# Patient Record
Sex: FEMALE | Race: BLACK OR AFRICAN AMERICAN | Employment: OTHER | URBAN - METROPOLITAN AREA
[De-identification: names, ages, dates, MRNs, and addresses within clinical notes are randomized per-mention and may not be internally consistent; named-entity substitution may affect disease eponyms.]

---

## 2017-01-09 ENCOUNTER — OFFICE VISIT (OUTPATIENT)
Dept: INTERNAL MEDICINE CLINIC | Age: 67
End: 2017-01-09

## 2017-01-09 VITALS
SYSTOLIC BLOOD PRESSURE: 130 MMHG | OXYGEN SATURATION: 98 % | HEART RATE: 66 BPM | WEIGHT: 182 LBS | HEIGHT: 70 IN | BODY MASS INDEX: 26.05 KG/M2 | DIASTOLIC BLOOD PRESSURE: 80 MMHG

## 2017-01-09 DIAGNOSIS — J30.89 OTHER ALLERGIC RHINITIS: ICD-10-CM

## 2017-01-09 DIAGNOSIS — E78.2 MIXED HYPERLIPIDEMIA: ICD-10-CM

## 2017-01-09 DIAGNOSIS — R19.7 DIARRHEA, UNSPECIFIED TYPE: ICD-10-CM

## 2017-01-09 DIAGNOSIS — I10 HYPERTENSION GOAL BP (BLOOD PRESSURE) < 130/80: Primary | ICD-10-CM

## 2017-01-09 DIAGNOSIS — R12 HEARTBURN: ICD-10-CM

## 2017-01-09 DIAGNOSIS — E55.9 VITAMIN D DEFICIENCY: ICD-10-CM

## 2017-01-09 DIAGNOSIS — M79.671 FOOT PAIN, BILATERAL: ICD-10-CM

## 2017-01-09 DIAGNOSIS — M79.672 FOOT PAIN, BILATERAL: ICD-10-CM

## 2017-01-09 PROCEDURE — 99214 OFFICE O/P EST MOD 30 MIN: CPT | Performed by: INTERNAL MEDICINE

## 2017-01-09 RX ORDER — ATENOLOL 50 MG/1
50 TABLET ORAL DAILY
Qty: 90 TABLET | Refills: 0 | Status: SHIPPED | OUTPATIENT
Start: 2017-01-09 | End: 2017-04-27 | Stop reason: SDUPTHER

## 2017-01-09 RX ORDER — MULTIVIT-MIN/IRON/FOLIC ACID/K 18-600-40
1 CAPSULE ORAL DAILY
Qty: 30 CAPSULE | Refills: 3 | COMMUNITY
Start: 2017-01-09 | End: 2017-04-27 | Stop reason: SDUPTHER

## 2017-01-09 RX ORDER — VALSARTAN 160 MG/1
TABLET ORAL
Qty: 90 TABLET | Refills: 0 | Status: SHIPPED | OUTPATIENT
Start: 2017-01-09 | End: 2017-04-27 | Stop reason: SDUPTHER

## 2017-01-18 ENCOUNTER — OFFICE VISIT (OUTPATIENT)
Dept: DERMATOLOGY | Age: 67
End: 2017-01-18

## 2017-01-18 DIAGNOSIS — D22.9 BENIGN NEVUS: Primary | ICD-10-CM

## 2017-01-18 DIAGNOSIS — L72.0 EPIDERMOID CYST: ICD-10-CM

## 2017-01-18 DIAGNOSIS — L68.0 HIRSUTISM: ICD-10-CM

## 2017-01-18 PROCEDURE — 99202 OFFICE O/P NEW SF 15 MIN: CPT | Performed by: DERMATOLOGY

## 2017-01-21 DIAGNOSIS — I10 HYPERTENSION GOAL BP (BLOOD PRESSURE) < 130/80: ICD-10-CM

## 2017-01-21 DIAGNOSIS — E78.2 MIXED HYPERLIPIDEMIA: ICD-10-CM

## 2017-01-21 LAB
ALBUMIN SERPL-MCNC: 4.3 G/DL (ref 3.4–5)
ALP BLD-CCNC: 105 U/L (ref 40–129)
ALT SERPL-CCNC: 20 U/L (ref 10–40)
ANION GAP SERPL CALCULATED.3IONS-SCNC: 12 MMOL/L (ref 3–16)
AST SERPL-CCNC: 18 U/L (ref 15–37)
BILIRUB SERPL-MCNC: 0.6 MG/DL (ref 0–1)
BILIRUBIN DIRECT: <0.2 MG/DL (ref 0–0.3)
BILIRUBIN, INDIRECT: NORMAL MG/DL (ref 0–1)
BUN BLDV-MCNC: 10 MG/DL (ref 7–20)
CALCIUM SERPL-MCNC: 9.8 MG/DL (ref 8.3–10.6)
CHLORIDE BLD-SCNC: 102 MMOL/L (ref 99–110)
CHOLESTEROL, TOTAL: 213 MG/DL (ref 0–199)
CO2: 29 MMOL/L (ref 21–32)
CREAT SERPL-MCNC: 0.7 MG/DL (ref 0.6–1.2)
GFR AFRICAN AMERICAN: >60
GFR NON-AFRICAN AMERICAN: >60
GLUCOSE BLD-MCNC: 90 MG/DL (ref 70–99)
HDLC SERPL-MCNC: 57 MG/DL (ref 40–60)
LDL CHOLESTEROL CALCULATED: 134 MG/DL
POTASSIUM SERPL-SCNC: 5.2 MMOL/L (ref 3.5–5.1)
SODIUM BLD-SCNC: 143 MMOL/L (ref 136–145)
TOTAL PROTEIN: 7 G/DL (ref 6.4–8.2)
TRIGL SERPL-MCNC: 110 MG/DL (ref 0–150)
VLDLC SERPL CALC-MCNC: 22 MG/DL

## 2017-04-27 ENCOUNTER — OFFICE VISIT (OUTPATIENT)
Dept: INTERNAL MEDICINE CLINIC | Age: 67
End: 2017-04-27

## 2017-04-27 VITALS
HEIGHT: 70 IN | HEART RATE: 54 BPM | DIASTOLIC BLOOD PRESSURE: 80 MMHG | OXYGEN SATURATION: 98 % | BODY MASS INDEX: 25.91 KG/M2 | WEIGHT: 181 LBS | SYSTOLIC BLOOD PRESSURE: 124 MMHG

## 2017-04-27 DIAGNOSIS — J30.89 OTHER ALLERGIC RHINITIS: ICD-10-CM

## 2017-04-27 DIAGNOSIS — I10 HYPERTENSION GOAL BP (BLOOD PRESSURE) < 130/80: Primary | ICD-10-CM

## 2017-04-27 DIAGNOSIS — M79.671 FOOT PAIN, BILATERAL: ICD-10-CM

## 2017-04-27 DIAGNOSIS — M79.672 FOOT PAIN, BILATERAL: ICD-10-CM

## 2017-04-27 DIAGNOSIS — E78.2 MIXED HYPERLIPIDEMIA: ICD-10-CM

## 2017-04-27 DIAGNOSIS — R00.1 BRADYCARDIA: ICD-10-CM

## 2017-04-27 DIAGNOSIS — R12 HEARTBURN: ICD-10-CM

## 2017-04-27 DIAGNOSIS — M79.602 ARM PAIN, LEFT: ICD-10-CM

## 2017-04-27 DIAGNOSIS — E55.9 VITAMIN D DEFICIENCY: ICD-10-CM

## 2017-04-27 LAB
ANION GAP SERPL CALCULATED.3IONS-SCNC: 15 MMOL/L (ref 3–16)
BUN BLDV-MCNC: 13 MG/DL (ref 7–20)
CALCIUM SERPL-MCNC: 9.4 MG/DL (ref 8.3–10.6)
CHLORIDE BLD-SCNC: 102 MMOL/L (ref 99–110)
CHOLESTEROL, TOTAL: 207 MG/DL (ref 0–199)
CO2: 26 MMOL/L (ref 21–32)
CREAT SERPL-MCNC: 0.6 MG/DL (ref 0.6–1.2)
GFR AFRICAN AMERICAN: >60
GFR NON-AFRICAN AMERICAN: >60
GLUCOSE BLD-MCNC: 86 MG/DL (ref 70–99)
HDLC SERPL-MCNC: 61 MG/DL (ref 40–60)
LDL CHOLESTEROL CALCULATED: 124 MG/DL
POTASSIUM SERPL-SCNC: 4.5 MMOL/L (ref 3.5–5.1)
SODIUM BLD-SCNC: 143 MMOL/L (ref 136–145)
TRIGL SERPL-MCNC: 108 MG/DL (ref 0–150)
VLDLC SERPL CALC-MCNC: 22 MG/DL

## 2017-04-27 PROCEDURE — 99214 OFFICE O/P EST MOD 30 MIN: CPT | Performed by: INTERNAL MEDICINE

## 2017-04-27 PROCEDURE — 93000 ELECTROCARDIOGRAM COMPLETE: CPT | Performed by: INTERNAL MEDICINE

## 2017-04-27 RX ORDER — MULTIVIT-MIN/IRON/FOLIC ACID/K 18-600-40
1 CAPSULE ORAL DAILY
Qty: 30 CAPSULE | Refills: 3 | COMMUNITY
Start: 2017-04-27 | End: 2017-07-14 | Stop reason: SDUPTHER

## 2017-04-27 RX ORDER — ATENOLOL 25 MG/1
25 TABLET ORAL DAILY
Qty: 30 TABLET | Refills: 3 | Status: SHIPPED | OUTPATIENT
Start: 2017-04-27 | End: 2017-06-02 | Stop reason: SINTOL

## 2017-04-27 RX ORDER — ATENOLOL 50 MG/1
50 TABLET ORAL DAILY
Qty: 90 TABLET | Refills: 0 | Status: SHIPPED | OUTPATIENT
Start: 2017-04-27 | End: 2017-04-27 | Stop reason: DRUGHIGH

## 2017-04-27 RX ORDER — VALSARTAN 160 MG/1
TABLET ORAL
Qty: 90 TABLET | Refills: 0 | Status: SHIPPED | OUTPATIENT
Start: 2017-04-27 | End: 2017-04-27 | Stop reason: DRUGHIGH

## 2017-04-27 RX ORDER — VALSARTAN 320 MG/1
320 TABLET ORAL DAILY
Qty: 30 TABLET | Refills: 3 | Status: SHIPPED | OUTPATIENT
Start: 2017-04-27 | End: 2017-06-02 | Stop reason: SINTOL

## 2017-04-27 ASSESSMENT — PATIENT HEALTH QUESTIONNAIRE - PHQ9
1. LITTLE INTEREST OR PLEASURE IN DOING THINGS: 0
SUM OF ALL RESPONSES TO PHQ QUESTIONS 1-9: 0
SUM OF ALL RESPONSES TO PHQ9 QUESTIONS 1 & 2: 0
2. FEELING DOWN, DEPRESSED OR HOPELESS: 0

## 2017-04-28 LAB
TSH REFLEX: 0.4 UIU/ML (ref 0.27–4.2)
VITAMIN D 25-HYDROXY: 49.2 NG/ML

## 2017-05-05 ENCOUNTER — TELEPHONE (OUTPATIENT)
Dept: INTERNAL MEDICINE CLINIC | Age: 67
End: 2017-05-05

## 2017-06-02 ENCOUNTER — OFFICE VISIT (OUTPATIENT)
Dept: INTERNAL MEDICINE CLINIC | Age: 67
End: 2017-06-02

## 2017-06-02 VITALS
SYSTOLIC BLOOD PRESSURE: 138 MMHG | HEART RATE: 66 BPM | HEIGHT: 70 IN | BODY MASS INDEX: 25.91 KG/M2 | WEIGHT: 181 LBS | TEMPERATURE: 98.1 F | OXYGEN SATURATION: 99 % | DIASTOLIC BLOOD PRESSURE: 80 MMHG

## 2017-06-02 DIAGNOSIS — J30.89 OTHER ALLERGIC RHINITIS: ICD-10-CM

## 2017-06-02 DIAGNOSIS — E78.2 MIXED HYPERLIPIDEMIA: ICD-10-CM

## 2017-06-02 DIAGNOSIS — G44.89 OTHER HEADACHE SYNDROME: Primary | ICD-10-CM

## 2017-06-02 DIAGNOSIS — I10 HYPERTENSION GOAL BP (BLOOD PRESSURE) < 130/80: ICD-10-CM

## 2017-06-02 PROCEDURE — 99214 OFFICE O/P EST MOD 30 MIN: CPT | Performed by: INTERNAL MEDICINE

## 2017-06-02 RX ORDER — SPIRONOLACTONE 25 MG/1
25 TABLET ORAL DAILY
Qty: 30 TABLET | Refills: 3 | Status: SHIPPED | OUTPATIENT
Start: 2017-06-02 | End: 2017-07-14 | Stop reason: ALTCHOICE

## 2017-06-02 RX ORDER — FLUTICASONE PROPIONATE 50 MCG
2 SPRAY, SUSPENSION (ML) NASAL DAILY PRN
Qty: 1 BOTTLE | Refills: 1 | Status: SHIPPED | OUTPATIENT
Start: 2017-06-02 | End: 2017-12-14 | Stop reason: SDUPTHER

## 2017-06-02 RX ORDER — LORATADINE 10 MG/1
10 TABLET ORAL DAILY PRN
Qty: 30 TABLET | Refills: 1 | Status: SHIPPED | OUTPATIENT
Start: 2017-06-02 | End: 2017-12-14 | Stop reason: SDUPTHER

## 2017-06-02 RX ORDER — AMLODIPINE BESYLATE 10 MG/1
10 TABLET ORAL DAILY
Qty: 30 TABLET | Refills: 3 | Status: SHIPPED | OUTPATIENT
Start: 2017-06-02 | End: 2017-07-14 | Stop reason: SINTOL

## 2017-06-14 DIAGNOSIS — M79.601 ARM PAIN, RIGHT: Primary | ICD-10-CM

## 2017-06-14 PROCEDURE — 73060 X-RAY EXAM OF HUMERUS: CPT | Performed by: ORTHOPAEDIC SURGERY

## 2017-06-20 ENCOUNTER — OFFICE VISIT (OUTPATIENT)
Dept: INTERNAL MEDICINE CLINIC | Age: 67
End: 2017-06-20

## 2017-06-20 VITALS
WEIGHT: 180 LBS | OXYGEN SATURATION: 98 % | DIASTOLIC BLOOD PRESSURE: 80 MMHG | BODY MASS INDEX: 25.77 KG/M2 | SYSTOLIC BLOOD PRESSURE: 122 MMHG | TEMPERATURE: 97.5 F | HEIGHT: 70 IN | HEART RATE: 88 BPM

## 2017-06-20 DIAGNOSIS — M25.472 PAIN AND SWELLING OF ANKLE, LEFT: Primary | ICD-10-CM

## 2017-06-20 DIAGNOSIS — M25.572 PAIN AND SWELLING OF ANKLE, LEFT: Primary | ICD-10-CM

## 2017-06-20 DIAGNOSIS — G44.89 OTHER HEADACHE SYNDROME: ICD-10-CM

## 2017-06-20 DIAGNOSIS — L03.116 CELLULITIS OF LEFT ANKLE: ICD-10-CM

## 2017-06-20 DIAGNOSIS — E78.2 MIXED HYPERLIPIDEMIA: ICD-10-CM

## 2017-06-20 DIAGNOSIS — J30.89 OTHER ALLERGIC RHINITIS: ICD-10-CM

## 2017-06-20 DIAGNOSIS — I10 ESSENTIAL HYPERTENSION: ICD-10-CM

## 2017-06-20 LAB
BASOPHILS ABSOLUTE: 0 K/UL (ref 0–0.2)
BASOPHILS RELATIVE PERCENT: 0.5 %
EOSINOPHILS ABSOLUTE: 0 K/UL (ref 0–0.6)
EOSINOPHILS RELATIVE PERCENT: 0.1 %
HCT VFR BLD CALC: 41.6 % (ref 36–48)
HEMOGLOBIN: 13.7 G/DL (ref 12–16)
LYMPHOCYTES ABSOLUTE: 1.6 K/UL (ref 1–5.1)
LYMPHOCYTES RELATIVE PERCENT: 34.8 %
MCH RBC QN AUTO: 31.1 PG (ref 26–34)
MCHC RBC AUTO-ENTMCNC: 33 G/DL (ref 31–36)
MCV RBC AUTO: 94.2 FL (ref 80–100)
MONOCYTES ABSOLUTE: 0.4 K/UL (ref 0–1.3)
MONOCYTES RELATIVE PERCENT: 8.8 %
NEUTROPHILS ABSOLUTE: 2.6 K/UL (ref 1.7–7.7)
NEUTROPHILS RELATIVE PERCENT: 55.8 %
PDW BLD-RTO: 13.4 % (ref 12.4–15.4)
PLATELET # BLD: 236 K/UL (ref 135–450)
PMV BLD AUTO: 9.7 FL (ref 5–10.5)
RBC # BLD: 4.42 M/UL (ref 4–5.2)
WBC # BLD: 4.6 K/UL (ref 4–11)

## 2017-06-20 PROCEDURE — 99214 OFFICE O/P EST MOD 30 MIN: CPT | Performed by: INTERNAL MEDICINE

## 2017-06-20 RX ORDER — CEPHALEXIN 500 MG/1
500 CAPSULE ORAL 4 TIMES DAILY
Qty: 28 CAPSULE | Refills: 0 | Status: SHIPPED | OUTPATIENT
Start: 2017-06-20 | End: 2017-06-27

## 2017-06-21 LAB
ANION GAP SERPL CALCULATED.3IONS-SCNC: 19 MMOL/L (ref 3–16)
BUN BLDV-MCNC: 13 MG/DL (ref 7–20)
CALCIUM SERPL-MCNC: 10.2 MG/DL (ref 8.3–10.6)
CHLORIDE BLD-SCNC: 98 MMOL/L (ref 99–110)
CO2: 24 MMOL/L (ref 21–32)
CREAT SERPL-MCNC: 0.7 MG/DL (ref 0.6–1.2)
GFR AFRICAN AMERICAN: >60
GFR NON-AFRICAN AMERICAN: >60
GLUCOSE BLD-MCNC: 88 MG/DL (ref 70–99)
POTASSIUM SERPL-SCNC: 5.2 MMOL/L (ref 3.5–5.1)
SODIUM BLD-SCNC: 141 MMOL/L (ref 136–145)
URIC ACID, SERUM: 4.1 MG/DL (ref 2.6–6)

## 2017-07-14 ENCOUNTER — OFFICE VISIT (OUTPATIENT)
Dept: INTERNAL MEDICINE CLINIC | Age: 67
End: 2017-07-14

## 2017-07-14 VITALS
HEIGHT: 70 IN | SYSTOLIC BLOOD PRESSURE: 130 MMHG | OXYGEN SATURATION: 98 % | DIASTOLIC BLOOD PRESSURE: 84 MMHG | HEART RATE: 64 BPM | WEIGHT: 186 LBS | BODY MASS INDEX: 26.63 KG/M2

## 2017-07-14 DIAGNOSIS — E55.9 VITAMIN D DEFICIENCY: ICD-10-CM

## 2017-07-14 DIAGNOSIS — M25.473 PAIN AND SWELLING OF ANKLE, UNSPECIFIED LATERALITY: ICD-10-CM

## 2017-07-14 DIAGNOSIS — E78.2 MIXED HYPERLIPIDEMIA: ICD-10-CM

## 2017-07-14 DIAGNOSIS — I10 ESSENTIAL HYPERTENSION: ICD-10-CM

## 2017-07-14 DIAGNOSIS — R60.0 LOCALIZED EDEMA: Primary | ICD-10-CM

## 2017-07-14 DIAGNOSIS — J30.89 OTHER ALLERGIC RHINITIS: ICD-10-CM

## 2017-07-14 DIAGNOSIS — M25.579 PAIN AND SWELLING OF ANKLE, UNSPECIFIED LATERALITY: ICD-10-CM

## 2017-07-14 PROCEDURE — 99214 OFFICE O/P EST MOD 30 MIN: CPT | Performed by: INTERNAL MEDICINE

## 2017-07-14 RX ORDER — FUROSEMIDE 20 MG/1
20 TABLET ORAL DAILY PRN
Qty: 30 TABLET | Refills: 0 | Status: SHIPPED | OUTPATIENT
Start: 2017-07-14 | End: 2018-06-15 | Stop reason: ALTCHOICE

## 2017-07-14 RX ORDER — MULTIVIT-MIN/IRON/FOLIC ACID/K 18-600-40
1 CAPSULE ORAL DAILY
Qty: 30 CAPSULE | Refills: 3 | Status: SHIPPED | OUTPATIENT
Start: 2017-07-14 | End: 2017-08-11 | Stop reason: SDUPTHER

## 2017-07-14 RX ORDER — VALSARTAN 160 MG/1
160 TABLET ORAL DAILY
Qty: 30 TABLET | Refills: 3 | Status: SHIPPED | OUTPATIENT
Start: 2017-07-14 | End: 2017-08-11 | Stop reason: SDUPTHER

## 2017-08-11 ENCOUNTER — HOSPITAL ENCOUNTER (OUTPATIENT)
Dept: VASCULAR LAB | Age: 67
Discharge: HOME | End: 2017-08-11
Attending: INTERNAL MEDICINE | Admitting: INTERNAL MEDICINE

## 2017-08-11 ENCOUNTER — OFFICE VISIT (OUTPATIENT)
Dept: INTERNAL MEDICINE CLINIC | Age: 67
End: 2017-08-11

## 2017-08-11 ENCOUNTER — TELEPHONE (OUTPATIENT)
Dept: INTERNAL MEDICINE CLINIC | Age: 67
End: 2017-08-11

## 2017-08-11 VITALS
HEIGHT: 69 IN | WEIGHT: 181.6 LBS | BODY MASS INDEX: 26.9 KG/M2 | DIASTOLIC BLOOD PRESSURE: 86 MMHG | HEART RATE: 63 BPM | SYSTOLIC BLOOD PRESSURE: 132 MMHG | OXYGEN SATURATION: 99 % | TEMPERATURE: 97.7 F

## 2017-08-11 DIAGNOSIS — I10 ESSENTIAL HYPERTENSION: ICD-10-CM

## 2017-08-11 DIAGNOSIS — M79.89 PAIN AND SWELLING OF LOWER EXTREMITY, RIGHT: Primary | ICD-10-CM

## 2017-08-11 DIAGNOSIS — J30.89 ALLERGIC RHINITIS DUE TO OTHER ALLERGEN: ICD-10-CM

## 2017-08-11 DIAGNOSIS — R60.0 LOCALIZED EDEMA: Primary | ICD-10-CM

## 2017-08-11 DIAGNOSIS — M79.604 PAIN AND SWELLING OF LOWER EXTREMITY, RIGHT: Primary | ICD-10-CM

## 2017-08-11 DIAGNOSIS — E78.2 MIXED HYPERLIPIDEMIA: ICD-10-CM

## 2017-08-11 DIAGNOSIS — E55.9 VITAMIN D DEFICIENCY: ICD-10-CM

## 2017-08-11 DIAGNOSIS — M79.604 PAIN OF RIGHT LEG: ICD-10-CM

## 2017-08-11 PROCEDURE — 99214 OFFICE O/P EST MOD 30 MIN: CPT | Performed by: INTERNAL MEDICINE

## 2017-08-11 RX ORDER — VALSARTAN 160 MG/1
160 TABLET ORAL DAILY
Qty: 90 TABLET | Refills: 0 | Status: SHIPPED | OUTPATIENT
Start: 2017-08-11 | End: 2017-09-21 | Stop reason: SDUPTHER

## 2017-08-11 RX ORDER — MULTIVIT-MIN/IRON/FOLIC ACID/K 18-600-40
1 CAPSULE ORAL DAILY
Qty: 90 CAPSULE | Refills: 0 | Status: SHIPPED | OUTPATIENT
Start: 2017-08-11 | End: 2017-09-21 | Stop reason: SDUPTHER

## 2017-09-21 ENCOUNTER — OFFICE VISIT (OUTPATIENT)
Dept: INTERNAL MEDICINE CLINIC | Age: 67
End: 2017-09-21

## 2017-09-21 VITALS
WEIGHT: 180.2 LBS | BODY MASS INDEX: 26.69 KG/M2 | HEIGHT: 69 IN | TEMPERATURE: 97.8 F | HEART RATE: 63 BPM | SYSTOLIC BLOOD PRESSURE: 126 MMHG | DIASTOLIC BLOOD PRESSURE: 80 MMHG | OXYGEN SATURATION: 99 %

## 2017-09-21 DIAGNOSIS — E78.2 MIXED HYPERLIPIDEMIA: ICD-10-CM

## 2017-09-21 DIAGNOSIS — I10 ESSENTIAL HYPERTENSION: Primary | ICD-10-CM

## 2017-09-21 DIAGNOSIS — R12 HEARTBURN: ICD-10-CM

## 2017-09-21 DIAGNOSIS — M79.89 PAIN AND SWELLING OF LOWER EXTREMITY, RIGHT: ICD-10-CM

## 2017-09-21 DIAGNOSIS — J30.89 ALLERGIC RHINITIS DUE TO OTHER ALLERGEN: ICD-10-CM

## 2017-09-21 DIAGNOSIS — M79.604 PAIN AND SWELLING OF LOWER EXTREMITY, RIGHT: ICD-10-CM

## 2017-09-21 DIAGNOSIS — E55.9 VITAMIN D DEFICIENCY: ICD-10-CM

## 2017-09-21 LAB
ANION GAP SERPL CALCULATED.3IONS-SCNC: 14 MMOL/L (ref 3–16)
BUN BLDV-MCNC: 13 MG/DL (ref 7–20)
CALCIUM SERPL-MCNC: 10.1 MG/DL (ref 8.3–10.6)
CHLORIDE BLD-SCNC: 100 MMOL/L (ref 99–110)
CHOLESTEROL, TOTAL: 203 MG/DL (ref 0–199)
CO2: 28 MMOL/L (ref 21–32)
CREAT SERPL-MCNC: 0.7 MG/DL (ref 0.6–1.2)
GFR AFRICAN AMERICAN: >60
GFR NON-AFRICAN AMERICAN: >60
GLUCOSE BLD-MCNC: 84 MG/DL (ref 70–99)
HDLC SERPL-MCNC: 54 MG/DL (ref 40–60)
LDL CHOLESTEROL CALCULATED: 124 MG/DL
POTASSIUM SERPL-SCNC: 4.7 MMOL/L (ref 3.5–5.1)
SODIUM BLD-SCNC: 142 MMOL/L (ref 136–145)
TRIGL SERPL-MCNC: 123 MG/DL (ref 0–150)
VITAMIN D 25-HYDROXY: 59.2 NG/ML
VLDLC SERPL CALC-MCNC: 25 MG/DL

## 2017-09-21 PROCEDURE — 99214 OFFICE O/P EST MOD 30 MIN: CPT | Performed by: INTERNAL MEDICINE

## 2017-09-21 RX ORDER — VALSARTAN 160 MG/1
160 TABLET ORAL DAILY
Qty: 90 TABLET | Refills: 0 | Status: SHIPPED | OUTPATIENT
Start: 2017-09-21 | End: 2017-12-14 | Stop reason: SDUPTHER

## 2017-09-21 RX ORDER — MULTIVIT-MIN/IRON/FOLIC ACID/K 18-600-40
1 CAPSULE ORAL DAILY
Qty: 90 CAPSULE | Refills: 0 | Status: SHIPPED | OUTPATIENT
Start: 2017-09-21 | End: 2017-12-14 | Stop reason: SDUPTHER

## 2017-09-21 RX ORDER — RANITIDINE 150 MG/1
150 TABLET ORAL 2 TIMES DAILY
Qty: 60 TABLET | Refills: 3 | Status: SHIPPED | OUTPATIENT
Start: 2017-09-21 | End: 2017-12-14 | Stop reason: SDUPTHER

## 2017-12-14 ENCOUNTER — OFFICE VISIT (OUTPATIENT)
Dept: INTERNAL MEDICINE CLINIC | Age: 67
End: 2017-12-14

## 2017-12-14 VITALS
HEIGHT: 70 IN | OXYGEN SATURATION: 99 % | TEMPERATURE: 98.1 F | BODY MASS INDEX: 25.65 KG/M2 | HEART RATE: 78 BPM | WEIGHT: 179.2 LBS | SYSTOLIC BLOOD PRESSURE: 130 MMHG | DIASTOLIC BLOOD PRESSURE: 80 MMHG

## 2017-12-14 DIAGNOSIS — I10 ESSENTIAL HYPERTENSION: Primary | ICD-10-CM

## 2017-12-14 DIAGNOSIS — J30.89 OTHER ALLERGIC RHINITIS: ICD-10-CM

## 2017-12-14 DIAGNOSIS — E55.9 VITAMIN D DEFICIENCY: ICD-10-CM

## 2017-12-14 DIAGNOSIS — R12 HEARTBURN: ICD-10-CM

## 2017-12-14 DIAGNOSIS — E78.2 MIXED HYPERLIPIDEMIA: ICD-10-CM

## 2017-12-14 DIAGNOSIS — H53.9 VISION DISTURBANCE: ICD-10-CM

## 2017-12-14 DIAGNOSIS — M79.89 RIGHT LEG SWELLING: ICD-10-CM

## 2017-12-14 PROCEDURE — G8427 DOCREV CUR MEDS BY ELIG CLIN: HCPCS | Performed by: INTERNAL MEDICINE

## 2017-12-14 PROCEDURE — 1123F ACP DISCUSS/DSCN MKR DOCD: CPT | Performed by: INTERNAL MEDICINE

## 2017-12-14 PROCEDURE — 3014F SCREEN MAMMO DOC REV: CPT | Performed by: INTERNAL MEDICINE

## 2017-12-14 PROCEDURE — 3017F COLORECTAL CA SCREEN DOC REV: CPT | Performed by: INTERNAL MEDICINE

## 2017-12-14 PROCEDURE — 4040F PNEUMOC VAC/ADMIN/RCVD: CPT | Performed by: INTERNAL MEDICINE

## 2017-12-14 PROCEDURE — G8484 FLU IMMUNIZE NO ADMIN: HCPCS | Performed by: INTERNAL MEDICINE

## 2017-12-14 PROCEDURE — 1036F TOBACCO NON-USER: CPT | Performed by: INTERNAL MEDICINE

## 2017-12-14 PROCEDURE — 99214 OFFICE O/P EST MOD 30 MIN: CPT | Performed by: INTERNAL MEDICINE

## 2017-12-14 PROCEDURE — G8400 PT W/DXA NO RESULTS DOC: HCPCS | Performed by: INTERNAL MEDICINE

## 2017-12-14 PROCEDURE — 1090F PRES/ABSN URINE INCON ASSESS: CPT | Performed by: INTERNAL MEDICINE

## 2017-12-14 PROCEDURE — G8417 CALC BMI ABV UP PARAM F/U: HCPCS | Performed by: INTERNAL MEDICINE

## 2017-12-14 RX ORDER — FLUTICASONE PROPIONATE 50 MCG
2 SPRAY, SUSPENSION (ML) NASAL DAILY PRN
Qty: 1 BOTTLE | Refills: 1 | Status: SHIPPED | OUTPATIENT
Start: 2017-12-14

## 2017-12-14 RX ORDER — VALSARTAN 160 MG/1
160 TABLET ORAL DAILY
Qty: 90 TABLET | Refills: 0 | Status: SHIPPED | OUTPATIENT
Start: 2017-12-14 | End: 2018-03-15 | Stop reason: SDUPTHER

## 2017-12-14 RX ORDER — RANITIDINE 150 MG/1
150 TABLET ORAL 2 TIMES DAILY
Qty: 60 TABLET | Refills: 3 | Status: SHIPPED | OUTPATIENT
Start: 2017-12-14 | End: 2018-03-15 | Stop reason: SDUPTHER

## 2017-12-14 RX ORDER — LORATADINE 10 MG/1
10 TABLET ORAL DAILY PRN
Qty: 30 TABLET | Refills: 1 | Status: SHIPPED | OUTPATIENT
Start: 2017-12-14 | End: 2018-01-13

## 2017-12-14 RX ORDER — MULTIVIT-MIN/IRON/FOLIC ACID/K 18-600-40
1 CAPSULE ORAL DAILY
Qty: 90 CAPSULE | Refills: 0 | Status: SHIPPED | OUTPATIENT
Start: 2017-12-14 | End: 2018-03-15 | Stop reason: SDUPTHER

## 2017-12-14 NOTE — PROGRESS NOTES
SUBJECTIVE:  Skyler Corado is a 74 y.o. female [address] Complaint   Patient presents with    Hypertension     3 MONTH FOLLOW UP    Hyperlipidemia    Other      PT HERE FOR EVAL    HTN - TAKING MED. NO HEADACHE  , DIZZINESS No.   HLP - + DIET / EXERCISE COMPLIANCE. LABS D/W PT  VIT D DEF - TAKING MED. LABS D/W PT  ALLERGIC RHINITIS - + NASAL CONGESTION, + POSTNASAL DRAINAGE , NO SINUS PRESSURE, NO HA, OCC SNEEZING, + OCC WATERY ITCHY EYES. NO SCRATCHY THROAT  SWELLING - RT ANKLE AND RT LEG. INTERMITTENT. IMPROVED. NO PAIN . HEARTBURN - INTERMITTENT. OVERALL IMPROVED. RANITIDINE HELPING  C/O VISUAL CHANGES - FFG WITH OPHTHAL      DENIES CP, No SOB, No PALPITATIONS, No COUGH  No ABD PAIN, No N/V, No DIARRHEA, No CONSTIPATION, No MELENA, No HEMATOCHEZIA. No DYSURIA, No FREQ, No URGENCY, No HEMATURIA    PMH: REVIEWED    PSH: REVIEWED:    ALLERGY:  Celebrex [celecoxib]; Sulfa antibiotics; and Codeine    MEDS: REVIEWED  Medication Sig Taking? ranitidine (ZANTAC) 150 MG tablet Take 1 tablet by mouth 2 times daily    valsartan (DIOVAN) 160 MG tablet Take 1 tablet by mouth daily    Cholecalciferol (VITAMIN D) 2000 units CAPS capsule Take 1 capsule by mouth daily    furosemide (LASIX) 20 MG tablet Take 1 tablet by mouth daily as needed (PRN)    fluticasone (FLONASE) 50 MCG/ACT nasal spray 2 sprays by Nasal route daily as needed for Rhinitis    ammonium lactate (AMLACTIN) 12 % cream Apply  topically as needed for Dry Skin. Apply topically as needed.     triamcinolone (ARISTOCORT) 0.5 % cream         ROS: COMPREHENSIVE ROS AS IN HX, REST -VE  History obtained from chart review and the patient    OBJECTIVE:   NURSING NOTE AND VITALS REVIEWED  /72 (Site: Right Arm, Position: Sitting, Cuff Size: Medium Adult)   Pulse 78   Temp 98.1 °F (36.7 °C) (Oral)   Ht 5' 10\" (1.778 m)   Wt 179 lb 3.2 oz (81.3 kg)   SpO2 99%   BMI 25.71 kg/m²     NO ACUTE DISTRESS    REPEAT BP:  130/80 (RT), NO ORTHOSTASIS     Body mass index is 25.71 kg/m². HEENT: NO PALLOR, ANICTERIC, PERRLA, EOMI, NO CONJUNCTIVAL ERYTHEMA,                 + NASAL CONGESTION, NO SINUS TENDERNESS  NECK:  SUPPLE, TRACHEA MIDLINE, NT, NO JVD, NO CB, NO LA, NO TM, NO STIFFNESS  CHEST: RESPY EFFORT NL, GOOD AE, NO W/R/C  HEART: S1S2+ REG, NO M/G/R  ABD: SOFT, NT, NO HSM, BS+  EXT: NO EDEMA ON EVAL, NT, PULSES +. LANDY'S -VE  NEURO: ALERT AND ORIENTED X 3, NO MENINGEAL SIGNS, NO TREMORS, NL GAIT, NO FOCAL DEFICITS  PSYCH: FAIRLY GOOD AFFECT  BACK: NT, NO ROM, NO CVA TENDERNESS    PREVIOUS LABS REVIEWED AND D/W PT       ASSESSMENT / PLAN:    1. Essential hypertension  COUNSELLED. CONTINUE MED. LOW NA+ / DASH DIET/ EXERCISE. MONITOR. GOAL </= 120/80  DIOVAN 160 MG REFILLED  MAKE CHANGES AS NEEDED. 2. Mixed hyperlipidemia  COUNSELLED. ADVISED LOW FAT / CHOL DIET/ EXERCISE.  MONITOR. GOALS D/W PT.  MAKE CHANGES AS NEEDED. 3. Vitamin D deficiency  COUNSELLED. CONTINUE VIT D 2000 U DAILY. MONITOR AND MAKE CHANGES AS NEEDED. 4. Other allergic rhinitis  COUNSELLED. CLARITIN 10 MG DAILY/PRN. FLONASE PRN  MONITOR. MAKE CHANGES AS NEEDED. 5. Right leg and ankle swelling  COUNSELLED. IMPROVED. ADVISED LOW NA+ DIET. ELEVATE LEGS. MAKE CHANGES AS NEEDED. 6. Heartburn  COUNSELLED. STABLE ON MED - ranitidine (ZANTAC) 150 MG  ANTIREFLUX PRECAUTIONS ADVISED. MONITOR. MAKE CHANGES AS NEEDED. 7. Vision disturbance  COUNSELLED. F/U OPHTHAL FOR CONTINUED MGT. MAKE CHANGES AS NEEDED. PT DECLINED INFLUENZA VACCINE    PT DEFERRED PNEUMONIA VACCINE - READDRESS    Crescencio Pearson received counseling on the following healthy behaviors: nutrition, exercise and medication adherence    Patient given educational materials on Hyperlipidemia, Nutrition, Exercise and Hypertension    I have instructed Crescencio Pearson to complete a self tracking handout on Blood Pressures  and Weights and instructed them to bring it with them to her next appointment.      Discussed

## 2018-03-15 ENCOUNTER — OFFICE VISIT (OUTPATIENT)
Dept: INTERNAL MEDICINE CLINIC | Age: 68
End: 2018-03-15

## 2018-03-15 VITALS
TEMPERATURE: 98.3 F | HEART RATE: 88 BPM | SYSTOLIC BLOOD PRESSURE: 132 MMHG | WEIGHT: 180 LBS | OXYGEN SATURATION: 97 % | DIASTOLIC BLOOD PRESSURE: 84 MMHG | HEIGHT: 70 IN | BODY MASS INDEX: 25.77 KG/M2 | RESPIRATION RATE: 20 BRPM

## 2018-03-15 DIAGNOSIS — E78.2 MIXED HYPERLIPIDEMIA: ICD-10-CM

## 2018-03-15 DIAGNOSIS — I10 ESSENTIAL HYPERTENSION: Primary | ICD-10-CM

## 2018-03-15 DIAGNOSIS — M79.642 HAND PAIN, LEFT: ICD-10-CM

## 2018-03-15 DIAGNOSIS — J30.89 OTHER ALLERGIC RHINITIS: ICD-10-CM

## 2018-03-15 DIAGNOSIS — E55.9 VITAMIN D DEFICIENCY: ICD-10-CM

## 2018-03-15 DIAGNOSIS — R12 HEARTBURN: ICD-10-CM

## 2018-03-15 DIAGNOSIS — H53.9 VISION CHANGES: ICD-10-CM

## 2018-03-15 LAB
ANION GAP SERPL CALCULATED.3IONS-SCNC: 12 MMOL/L (ref 3–16)
BUN BLDV-MCNC: 13 MG/DL (ref 7–20)
CALCIUM SERPL-MCNC: 9.8 MG/DL (ref 8.3–10.6)
CHLORIDE BLD-SCNC: 102 MMOL/L (ref 99–110)
CHOLESTEROL, TOTAL: 208 MG/DL (ref 0–199)
CO2: 29 MMOL/L (ref 21–32)
CREAT SERPL-MCNC: 0.7 MG/DL (ref 0.6–1.2)
GFR AFRICAN AMERICAN: >60
GFR NON-AFRICAN AMERICAN: >60
GLUCOSE BLD-MCNC: 90 MG/DL (ref 70–99)
HDLC SERPL-MCNC: 62 MG/DL (ref 40–60)
LDL CHOLESTEROL CALCULATED: 126 MG/DL
POTASSIUM SERPL-SCNC: 5 MMOL/L (ref 3.5–5.1)
SODIUM BLD-SCNC: 143 MMOL/L (ref 136–145)
TRIGL SERPL-MCNC: 101 MG/DL (ref 0–150)
VITAMIN D 25-HYDROXY: 48 NG/ML
VLDLC SERPL CALC-MCNC: 20 MG/DL

## 2018-03-15 PROCEDURE — 3017F COLORECTAL CA SCREEN DOC REV: CPT | Performed by: INTERNAL MEDICINE

## 2018-03-15 PROCEDURE — 99214 OFFICE O/P EST MOD 30 MIN: CPT | Performed by: INTERNAL MEDICINE

## 2018-03-15 PROCEDURE — 3014F SCREEN MAMMO DOC REV: CPT | Performed by: INTERNAL MEDICINE

## 2018-03-15 PROCEDURE — G8484 FLU IMMUNIZE NO ADMIN: HCPCS | Performed by: INTERNAL MEDICINE

## 2018-03-15 PROCEDURE — 4040F PNEUMOC VAC/ADMIN/RCVD: CPT | Performed by: INTERNAL MEDICINE

## 2018-03-15 PROCEDURE — G8417 CALC BMI ABV UP PARAM F/U: HCPCS | Performed by: INTERNAL MEDICINE

## 2018-03-15 PROCEDURE — G8400 PT W/DXA NO RESULTS DOC: HCPCS | Performed by: INTERNAL MEDICINE

## 2018-03-15 PROCEDURE — 1090F PRES/ABSN URINE INCON ASSESS: CPT | Performed by: INTERNAL MEDICINE

## 2018-03-15 PROCEDURE — G8427 DOCREV CUR MEDS BY ELIG CLIN: HCPCS | Performed by: INTERNAL MEDICINE

## 2018-03-15 PROCEDURE — 1036F TOBACCO NON-USER: CPT | Performed by: INTERNAL MEDICINE

## 2018-03-15 PROCEDURE — 1123F ACP DISCUSS/DSCN MKR DOCD: CPT | Performed by: INTERNAL MEDICINE

## 2018-03-15 RX ORDER — NAPROXEN 500 MG/1
500 TABLET ORAL 2 TIMES DAILY PRN
Qty: 60 TABLET | Refills: 0 | Status: SHIPPED | OUTPATIENT
Start: 2018-03-15 | End: 2019-09-17 | Stop reason: ALTCHOICE

## 2018-03-15 RX ORDER — MULTIVIT-MIN/IRON/FOLIC ACID/K 18-600-40
1 CAPSULE ORAL DAILY
Qty: 90 CAPSULE | Refills: 0 | Status: SHIPPED | OUTPATIENT
Start: 2018-03-15 | End: 2018-06-15 | Stop reason: SDUPTHER

## 2018-03-15 RX ORDER — RANITIDINE 150 MG/1
150 TABLET ORAL 2 TIMES DAILY
Qty: 60 TABLET | Refills: 3 | Status: SHIPPED | OUTPATIENT
Start: 2018-03-15 | End: 2018-06-15 | Stop reason: SDUPTHER

## 2018-03-15 RX ORDER — VALSARTAN 160 MG/1
160 TABLET ORAL DAILY
Qty: 90 TABLET | Refills: 0 | Status: SHIPPED | OUTPATIENT
Start: 2018-03-15 | End: 2018-06-15 | Stop reason: SDUPTHER

## 2018-04-11 DIAGNOSIS — M79.642 HAND PAIN, LEFT: Primary | ICD-10-CM

## 2018-05-14 DIAGNOSIS — I10 ESSENTIAL HYPERTENSION: ICD-10-CM

## 2018-05-14 RX ORDER — VALSARTAN 160 MG/1
160 TABLET ORAL DAILY
Qty: 90 TABLET | Refills: 0 | Status: SHIPPED | OUTPATIENT
Start: 2018-05-14 | End: 2018-07-31 | Stop reason: RX

## 2018-06-15 ENCOUNTER — OFFICE VISIT (OUTPATIENT)
Dept: INTERNAL MEDICINE CLINIC | Age: 68
End: 2018-06-15

## 2018-06-15 VITALS
SYSTOLIC BLOOD PRESSURE: 130 MMHG | RESPIRATION RATE: 14 BRPM | BODY MASS INDEX: 25.34 KG/M2 | OXYGEN SATURATION: 98 % | HEIGHT: 70 IN | WEIGHT: 177 LBS | DIASTOLIC BLOOD PRESSURE: 80 MMHG | TEMPERATURE: 98 F | HEART RATE: 72 BPM

## 2018-06-15 DIAGNOSIS — Z01.818 PRE-OP EXAMINATION: Primary | ICD-10-CM

## 2018-06-15 DIAGNOSIS — I10 ESSENTIAL HYPERTENSION: ICD-10-CM

## 2018-06-15 DIAGNOSIS — R12 HEARTBURN: ICD-10-CM

## 2018-06-15 DIAGNOSIS — M79.642 HAND PAIN, LEFT: ICD-10-CM

## 2018-06-15 DIAGNOSIS — E78.2 MIXED HYPERLIPIDEMIA: ICD-10-CM

## 2018-06-15 DIAGNOSIS — H26.9 CATARACT OF RIGHT EYE, UNSPECIFIED CATARACT TYPE: ICD-10-CM

## 2018-06-15 DIAGNOSIS — E55.9 VITAMIN D DEFICIENCY: ICD-10-CM

## 2018-06-15 DIAGNOSIS — J30.89 OTHER ALLERGIC RHINITIS: ICD-10-CM

## 2018-06-15 PROCEDURE — G8427 DOCREV CUR MEDS BY ELIG CLIN: HCPCS | Performed by: INTERNAL MEDICINE

## 2018-06-15 PROCEDURE — G8400 PT W/DXA NO RESULTS DOC: HCPCS | Performed by: INTERNAL MEDICINE

## 2018-06-15 PROCEDURE — 3017F COLORECTAL CA SCREEN DOC REV: CPT | Performed by: INTERNAL MEDICINE

## 2018-06-15 PROCEDURE — 4040F PNEUMOC VAC/ADMIN/RCVD: CPT | Performed by: INTERNAL MEDICINE

## 2018-06-15 PROCEDURE — 1090F PRES/ABSN URINE INCON ASSESS: CPT | Performed by: INTERNAL MEDICINE

## 2018-06-15 PROCEDURE — 1036F TOBACCO NON-USER: CPT | Performed by: INTERNAL MEDICINE

## 2018-06-15 PROCEDURE — 1123F ACP DISCUSS/DSCN MKR DOCD: CPT | Performed by: INTERNAL MEDICINE

## 2018-06-15 PROCEDURE — G8417 CALC BMI ABV UP PARAM F/U: HCPCS | Performed by: INTERNAL MEDICINE

## 2018-06-15 PROCEDURE — 99215 OFFICE O/P EST HI 40 MIN: CPT | Performed by: INTERNAL MEDICINE

## 2018-06-15 PROCEDURE — 93000 ELECTROCARDIOGRAM COMPLETE: CPT | Performed by: INTERNAL MEDICINE

## 2018-06-15 RX ORDER — MULTIVIT-MIN/IRON/FOLIC ACID/K 18-600-40
1 CAPSULE ORAL DAILY
Qty: 90 CAPSULE | Refills: 0 | Status: SHIPPED | OUTPATIENT
Start: 2018-06-15 | End: 2018-09-10 | Stop reason: SDUPTHER

## 2018-06-15 RX ORDER — RANITIDINE 150 MG/1
150 TABLET ORAL 2 TIMES DAILY
Qty: 60 TABLET | Refills: 3 | Status: SHIPPED | OUTPATIENT
Start: 2018-06-15 | End: 2018-09-10 | Stop reason: SDUPTHER

## 2018-06-15 RX ORDER — TETANUS AND DIPHTHERIA TOXOIDS ADSORBED 2; 2 [LF]/.5ML; [LF]/.5ML
0.5 INJECTION INTRAMUSCULAR ONCE
Qty: 0.5 ML | Refills: 0 | Status: CANCELLED | OUTPATIENT
Start: 2018-06-15 | End: 2018-06-15

## 2018-06-15 ASSESSMENT — PATIENT HEALTH QUESTIONNAIRE - PHQ9
SUM OF ALL RESPONSES TO PHQ QUESTIONS 1-9: 0
2. FEELING DOWN, DEPRESSED OR HOPELESS: 0
1. LITTLE INTEREST OR PLEASURE IN DOING THINGS: 0
SUM OF ALL RESPONSES TO PHQ9 QUESTIONS 1 & 2: 0

## 2018-06-18 DIAGNOSIS — I10 ESSENTIAL HYPERTENSION: ICD-10-CM

## 2018-06-18 DIAGNOSIS — Z01.818 PRE-OP EXAMINATION: ICD-10-CM

## 2018-06-18 DIAGNOSIS — H26.9 CATARACT OF RIGHT EYE, UNSPECIFIED CATARACT TYPE: ICD-10-CM

## 2018-06-18 LAB
ANION GAP SERPL CALCULATED.3IONS-SCNC: 13 MMOL/L (ref 3–16)
BASOPHILS ABSOLUTE: 0 K/UL (ref 0–0.2)
BASOPHILS RELATIVE PERCENT: 0.9 %
BUN BLDV-MCNC: 10 MG/DL (ref 7–20)
CALCIUM SERPL-MCNC: 9.6 MG/DL (ref 8.3–10.6)
CHLORIDE BLD-SCNC: 105 MMOL/L (ref 99–110)
CO2: 28 MMOL/L (ref 21–32)
CREAT SERPL-MCNC: 0.8 MG/DL (ref 0.6–1.2)
EOSINOPHILS ABSOLUTE: 0 K/UL (ref 0–0.6)
EOSINOPHILS RELATIVE PERCENT: 0 %
GFR AFRICAN AMERICAN: >60
GFR NON-AFRICAN AMERICAN: >60
GLUCOSE BLD-MCNC: 86 MG/DL (ref 70–99)
HCT VFR BLD CALC: 41.2 % (ref 36–48)
HEMOGLOBIN: 13.6 G/DL (ref 12–16)
LYMPHOCYTES ABSOLUTE: 1.3 K/UL (ref 1–5.1)
LYMPHOCYTES RELATIVE PERCENT: 38 %
MCH RBC QN AUTO: 31.4 PG (ref 26–34)
MCHC RBC AUTO-ENTMCNC: 33.1 G/DL (ref 31–36)
MCV RBC AUTO: 94.9 FL (ref 80–100)
MONOCYTES ABSOLUTE: 0.3 K/UL (ref 0–1.3)
MONOCYTES RELATIVE PERCENT: 9.1 %
NEUTROPHILS ABSOLUTE: 1.7 K/UL (ref 1.7–7.7)
NEUTROPHILS RELATIVE PERCENT: 52 %
PDW BLD-RTO: 14 % (ref 12.4–15.4)
PLATELET # BLD: 215 K/UL (ref 135–450)
PMV BLD AUTO: 9.9 FL (ref 5–10.5)
POTASSIUM SERPL-SCNC: 4.3 MMOL/L (ref 3.5–5.1)
RBC # BLD: 4.34 M/UL (ref 4–5.2)
SODIUM BLD-SCNC: 146 MMOL/L (ref 136–145)
WBC # BLD: 3.3 K/UL (ref 4–11)

## 2018-06-20 ENCOUNTER — TELEPHONE (OUTPATIENT)
Dept: INTERNAL MEDICINE CLINIC | Age: 68
End: 2018-06-20

## 2018-07-31 ENCOUNTER — TELEPHONE (OUTPATIENT)
Dept: INTERNAL MEDICINE CLINIC | Age: 68
End: 2018-07-31

## 2018-07-31 RX ORDER — IRBESARTAN 150 MG/1
150 TABLET ORAL DAILY
Qty: 30 TABLET | Refills: 3 | Status: SHIPPED | OUTPATIENT
Start: 2018-07-31 | End: 2018-09-10 | Stop reason: SDUPTHER

## 2018-09-10 ENCOUNTER — OFFICE VISIT (OUTPATIENT)
Dept: INTERNAL MEDICINE CLINIC | Age: 68
End: 2018-09-10

## 2018-09-10 VITALS
OXYGEN SATURATION: 98 % | SYSTOLIC BLOOD PRESSURE: 124 MMHG | HEART RATE: 69 BPM | HEIGHT: 70 IN | DIASTOLIC BLOOD PRESSURE: 80 MMHG | BODY MASS INDEX: 25.65 KG/M2 | WEIGHT: 179.2 LBS | TEMPERATURE: 98.3 F

## 2018-09-10 DIAGNOSIS — E78.2 MIXED HYPERLIPIDEMIA: ICD-10-CM

## 2018-09-10 DIAGNOSIS — M79.642 HAND PAIN, LEFT: ICD-10-CM

## 2018-09-10 DIAGNOSIS — E78.2 MIXED HYPERLIPIDEMIA: Primary | ICD-10-CM

## 2018-09-10 DIAGNOSIS — E55.9 VITAMIN D DEFICIENCY: ICD-10-CM

## 2018-09-10 DIAGNOSIS — R12 HEARTBURN: ICD-10-CM

## 2018-09-10 DIAGNOSIS — D72.818 OTHER DECREASED WHITE BLOOD CELL (WBC) COUNT: ICD-10-CM

## 2018-09-10 DIAGNOSIS — H57.89 REDNESS OF RIGHT EYE: ICD-10-CM

## 2018-09-10 DIAGNOSIS — I10 ESSENTIAL HYPERTENSION: ICD-10-CM

## 2018-09-10 DIAGNOSIS — J30.89 OTHER ALLERGIC RHINITIS: ICD-10-CM

## 2018-09-10 LAB
BASOPHILS ABSOLUTE: 0 K/UL (ref 0–0.2)
BASOPHILS RELATIVE PERCENT: 0.5 %
EOSINOPHILS ABSOLUTE: 0 K/UL (ref 0–0.6)
EOSINOPHILS RELATIVE PERCENT: 0.1 %
HCT VFR BLD CALC: 42.6 % (ref 36–48)
HEMOGLOBIN: 14.1 G/DL (ref 12–16)
LYMPHOCYTES ABSOLUTE: 1.4 K/UL (ref 1–5.1)
LYMPHOCYTES RELATIVE PERCENT: 28.8 %
MCH RBC QN AUTO: 32.1 PG (ref 26–34)
MCHC RBC AUTO-ENTMCNC: 33.2 G/DL (ref 31–36)
MCV RBC AUTO: 96.8 FL (ref 80–100)
MONOCYTES ABSOLUTE: 0.4 K/UL (ref 0–1.3)
MONOCYTES RELATIVE PERCENT: 7.7 %
NEUTROPHILS ABSOLUTE: 3.1 K/UL (ref 1.7–7.7)
NEUTROPHILS RELATIVE PERCENT: 62.9 %
PDW BLD-RTO: 14.3 % (ref 12.4–15.4)
PLATELET # BLD: 238 K/UL (ref 135–450)
PMV BLD AUTO: 9.8 FL (ref 5–10.5)
RBC # BLD: 4.4 M/UL (ref 4–5.2)
WBC # BLD: 4.9 K/UL (ref 4–11)

## 2018-09-10 PROCEDURE — 99214 OFFICE O/P EST MOD 30 MIN: CPT | Performed by: INTERNAL MEDICINE

## 2018-09-10 PROCEDURE — 4040F PNEUMOC VAC/ADMIN/RCVD: CPT | Performed by: INTERNAL MEDICINE

## 2018-09-10 PROCEDURE — G8427 DOCREV CUR MEDS BY ELIG CLIN: HCPCS | Performed by: INTERNAL MEDICINE

## 2018-09-10 PROCEDURE — 1123F ACP DISCUSS/DSCN MKR DOCD: CPT | Performed by: INTERNAL MEDICINE

## 2018-09-10 PROCEDURE — 3017F COLORECTAL CA SCREEN DOC REV: CPT | Performed by: INTERNAL MEDICINE

## 2018-09-10 PROCEDURE — 1101F PT FALLS ASSESS-DOCD LE1/YR: CPT | Performed by: INTERNAL MEDICINE

## 2018-09-10 PROCEDURE — G8400 PT W/DXA NO RESULTS DOC: HCPCS | Performed by: INTERNAL MEDICINE

## 2018-09-10 PROCEDURE — 1090F PRES/ABSN URINE INCON ASSESS: CPT | Performed by: INTERNAL MEDICINE

## 2018-09-10 PROCEDURE — 1036F TOBACCO NON-USER: CPT | Performed by: INTERNAL MEDICINE

## 2018-09-10 PROCEDURE — G8417 CALC BMI ABV UP PARAM F/U: HCPCS | Performed by: INTERNAL MEDICINE

## 2018-09-10 RX ORDER — RANITIDINE 150 MG/1
150 TABLET ORAL 2 TIMES DAILY
Qty: 60 TABLET | Refills: 3 | Status: SHIPPED | OUTPATIENT
Start: 2018-09-10 | End: 2019-03-11 | Stop reason: SDUPTHER

## 2018-09-10 RX ORDER — IRBESARTAN 150 MG/1
150 TABLET ORAL DAILY
Qty: 90 TABLET | Refills: 0 | Status: SHIPPED | OUTPATIENT
Start: 2018-09-10 | End: 2018-12-10 | Stop reason: SDUPTHER

## 2018-09-10 RX ORDER — MULTIVIT-MIN/IRON/FOLIC ACID/K 18-600-40
1 CAPSULE ORAL DAILY
Qty: 90 CAPSULE | Refills: 0 | Status: SHIPPED | OUTPATIENT
Start: 2018-09-10 | End: 2018-12-10 | Stop reason: SDUPTHER

## 2018-09-10 RX ORDER — IRBESARTAN 150 MG/1
150 TABLET ORAL DAILY
Qty: 30 TABLET | Refills: 3 | Status: SHIPPED | OUTPATIENT
Start: 2018-09-10 | End: 2018-09-10 | Stop reason: SDUPTHER

## 2018-09-10 NOTE — PROGRESS NOTES
10\" (1.778 m)   Wt 179 lb 3.2 oz (81.3 kg)   SpO2 98%   Breastfeeding? No   BMI 25.71 kg/m²     NO ACUTE DISTRESS    REPEAT BP:  124/80 (LT), NO ORTHOSTASIS     Body mass index is 25.71 kg/m². HEENT: NO PALLOR, ANICTERIC, PERRLA, EOMI, RT CONJUNCTIVAL ERYTHEMA- STATES FOLLOWING WITH OPHTHAL,                 NO SINUS TENDERNESS  NECK:  SUPPLE, TRACHEA MIDLINE, NT, NO JVD, NO CB, NO LA, NO TM, NO STIFFNESS  CHEST: RESPY EFFORT NL, GOOD AE, NO W/R/C  HEART: S1S2+ REG, NO M/G/R  ABD: SOFT, NT, NO HSM, BS+  EXT: NO EDEMA, NT, PULSES +. LANDY'S -VE  NEURO: ALERT AND ORIENTED X 3, NO MENINGEAL SIGNS, NO TREMORS, NL GAIT, NO FOCAL DEFICITS  PSYCH: FAIRLY GOOD AFFECT  BACK: NT, NO ROM, NO CVA TENDERNESS  HAND: NT, NO ROM, NO SWELLING      PREVIOUS LABS REVIEWED AND D/W PT       ASSESSMENT / PLAN:     Diagnosis Orders   1. Mixed hyperlipidemia  COUNSELLED. ADVISED LOW FAT / CHOL DIET/ EXERCISE.  MONITOR. F/U LABS  GOALS D/W PT.  MAKE CHANGES AS NEEDED. 2. Essential hypertension  COUNSELLED. CONTINUE AVAPRO 150 MG   ADVISED LOW NA+ / DASH DIET/ EXERCISE. MONITOR. GOAL </= 120/80  F/U LABS  MAKE CHANGES AS NEEDED. 3. Hand pain, left  COUNSELLED. IMPROVING. EXERCISES. ANALGESICS PRN. MONITOR. MAKE CHANGES AS NEEDED. 4. Vitamin D deficiency  COUNSELLED. MONITOR ON VIT D SUPPLEMENT. MAKE CHANGES AS NEEDED. 5. Heartburn  COUNSELLED. IMPROVED. CONTINUE MGT. ANTIREFLUX PRECAUTIONS ADVISED. MONITOR. MAKE CHANGES AS NEEDED. 6. Other allergic rhinitis  COUNSELLED. MED PRN. MONITOR  MAKE CHANGES AS NEEDED. 7. Other decreased white blood cell (WBC) count  COUNSELLED. LAB TO REEVAL. MONITOR  MAKE CHANGES AS NEEDED. 8. Redness of right eye  COUNSELLED. S/P PRIOR SURGERY. FOLLOW UP MGT PER OPHTHAL  MAKE CHANGES AS NEEDED.                  [facility] received counseling on the following healthy behaviors: nutrition, exercise and medication adherence    Patient given educational materials on Hyperlipidemia, Nutrition, Exercise and Hypertension    I have instructed Neville Valero to complete a self tracking handout on Blood Pressures  and Weights and instructed them to bring it with them to her next appointment. Discussed use, benefit, and side effects of prescribed medications. Barriers to medication compliance addressed. All patient questions answered. Pt voiced understanding.            MEDICATION SIDE EFFECTS D/W PATIENT    PT STABLE AT TIME OF D/C.    RETURN TO CLINIC WITHIN 3 MONTHS / PRN    FOLLOW UP FOR FASTING LABS

## 2018-09-11 LAB
A/G RATIO: 1.7 (ref 1.1–2.2)
ALBUMIN SERPL-MCNC: 4.8 G/DL (ref 3.4–5)
ALP BLD-CCNC: 110 U/L (ref 40–129)
ALT SERPL-CCNC: 27 U/L (ref 10–40)
ANION GAP SERPL CALCULATED.3IONS-SCNC: 14 MMOL/L (ref 3–16)
AST SERPL-CCNC: 35 U/L (ref 15–37)
BILIRUB SERPL-MCNC: 0.6 MG/DL (ref 0–1)
BUN BLDV-MCNC: 10 MG/DL (ref 7–20)
CALCIUM SERPL-MCNC: 10 MG/DL (ref 8.3–10.6)
CHLORIDE BLD-SCNC: 98 MMOL/L (ref 99–110)
CO2: 29 MMOL/L (ref 21–32)
CREAT SERPL-MCNC: 0.7 MG/DL (ref 0.6–1.2)
CREATININE URINE: 91.4 MG/DL (ref 28–259)
GFR AFRICAN AMERICAN: >60
GFR NON-AFRICAN AMERICAN: >60
GLOBULIN: 2.8 G/DL
GLUCOSE BLD-MCNC: 79 MG/DL (ref 70–99)
MICROALBUMIN UR-MCNC: <1.2 MG/DL
MICROALBUMIN/CREAT UR-RTO: NORMAL MG/G (ref 0–30)
POTASSIUM SERPL-SCNC: 4.4 MMOL/L (ref 3.5–5.1)
SODIUM BLD-SCNC: 141 MMOL/L (ref 136–145)
TOTAL PROTEIN: 7.6 G/DL (ref 6.4–8.2)
TSH REFLEX: 0.47 UIU/ML (ref 0.27–4.2)
VITAMIN D 25-HYDROXY: 54.5 NG/ML

## 2018-12-10 ENCOUNTER — OFFICE VISIT (OUTPATIENT)
Dept: INTERNAL MEDICINE CLINIC | Age: 68
End: 2018-12-10
Payer: MEDICARE

## 2018-12-10 VITALS
SYSTOLIC BLOOD PRESSURE: 130 MMHG | HEIGHT: 70 IN | HEART RATE: 61 BPM | OXYGEN SATURATION: 97 % | TEMPERATURE: 98 F | BODY MASS INDEX: 25.74 KG/M2 | WEIGHT: 179.8 LBS | DIASTOLIC BLOOD PRESSURE: 80 MMHG

## 2018-12-10 DIAGNOSIS — E55.9 VITAMIN D DEFICIENCY: ICD-10-CM

## 2018-12-10 DIAGNOSIS — R12 HEARTBURN: ICD-10-CM

## 2018-12-10 DIAGNOSIS — N39.498 OTHER URINARY INCONTINENCE: ICD-10-CM

## 2018-12-10 DIAGNOSIS — E78.2 MIXED HYPERLIPIDEMIA: ICD-10-CM

## 2018-12-10 DIAGNOSIS — I10 ESSENTIAL HYPERTENSION: ICD-10-CM

## 2018-12-10 DIAGNOSIS — R82.90 ABNORMAL URINALYSIS: ICD-10-CM

## 2018-12-10 DIAGNOSIS — J30.89 OTHER ALLERGIC RHINITIS: ICD-10-CM

## 2018-12-10 DIAGNOSIS — Z78.0 MENOPAUSE: ICD-10-CM

## 2018-12-10 DIAGNOSIS — I10 ESSENTIAL HYPERTENSION: Primary | ICD-10-CM

## 2018-12-10 PROBLEM — R32 ABSENCE OF BLADDER CONTINENCE: Status: ACTIVE | Noted: 2018-12-10

## 2018-12-10 LAB
ANION GAP SERPL CALCULATED.3IONS-SCNC: 11 MMOL/L (ref 3–16)
BILIRUBIN, POC: NORMAL
BLOOD URINE, POC: NORMAL
BUN BLDV-MCNC: 11 MG/DL (ref 7–20)
CALCIUM SERPL-MCNC: 10.1 MG/DL (ref 8.3–10.6)
CHLORIDE BLD-SCNC: 103 MMOL/L (ref 99–110)
CHOLESTEROL, TOTAL: 212 MG/DL (ref 0–199)
CLARITY, POC: NORMAL
CO2: 30 MMOL/L (ref 21–32)
COLOR, POC: YELLOW
CREAT SERPL-MCNC: 0.7 MG/DL (ref 0.6–1.2)
GFR AFRICAN AMERICAN: >60
GFR NON-AFRICAN AMERICAN: >60
GLUCOSE BLD-MCNC: 87 MG/DL (ref 70–99)
GLUCOSE URINE, POC: NORMAL
HDLC SERPL-MCNC: 60 MG/DL (ref 40–60)
KETONES, POC: NORMAL
LDL CHOLESTEROL CALCULATED: 132 MG/DL
LEUKOCYTE EST, POC: NORMAL
NITRITE, POC: NORMAL
PH, POC: 0.2
POTASSIUM SERPL-SCNC: 4.9 MMOL/L (ref 3.5–5.1)
PROTEIN, POC: NORMAL
SODIUM BLD-SCNC: 144 MMOL/L (ref 136–145)
SPECIFIC GRAVITY, POC: 1.01
TRIGL SERPL-MCNC: 101 MG/DL (ref 0–150)
UROBILINOGEN, POC: 0.2
VLDLC SERPL CALC-MCNC: 20 MG/DL

## 2018-12-10 PROCEDURE — 1036F TOBACCO NON-USER: CPT | Performed by: INTERNAL MEDICINE

## 2018-12-10 PROCEDURE — G8427 DOCREV CUR MEDS BY ELIG CLIN: HCPCS | Performed by: INTERNAL MEDICINE

## 2018-12-10 PROCEDURE — 1090F PRES/ABSN URINE INCON ASSESS: CPT | Performed by: INTERNAL MEDICINE

## 2018-12-10 PROCEDURE — 1101F PT FALLS ASSESS-DOCD LE1/YR: CPT | Performed by: INTERNAL MEDICINE

## 2018-12-10 PROCEDURE — 99214 OFFICE O/P EST MOD 30 MIN: CPT | Performed by: INTERNAL MEDICINE

## 2018-12-10 PROCEDURE — 81002 URINALYSIS NONAUTO W/O SCOPE: CPT | Performed by: INTERNAL MEDICINE

## 2018-12-10 PROCEDURE — 0509F URINE INCON PLAN DOCD: CPT | Performed by: INTERNAL MEDICINE

## 2018-12-10 PROCEDURE — 1123F ACP DISCUSS/DSCN MKR DOCD: CPT | Performed by: INTERNAL MEDICINE

## 2018-12-10 PROCEDURE — G8400 PT W/DXA NO RESULTS DOC: HCPCS | Performed by: INTERNAL MEDICINE

## 2018-12-10 PROCEDURE — 4040F PNEUMOC VAC/ADMIN/RCVD: CPT | Performed by: INTERNAL MEDICINE

## 2018-12-10 PROCEDURE — G8484 FLU IMMUNIZE NO ADMIN: HCPCS | Performed by: INTERNAL MEDICINE

## 2018-12-10 PROCEDURE — G8417 CALC BMI ABV UP PARAM F/U: HCPCS | Performed by: INTERNAL MEDICINE

## 2018-12-10 PROCEDURE — 3017F COLORECTAL CA SCREEN DOC REV: CPT | Performed by: INTERNAL MEDICINE

## 2018-12-10 RX ORDER — TETANUS AND DIPHTHERIA TOXOIDS ADSORBED 2; 2 [LF]/.5ML; [LF]/.5ML
0.5 INJECTION INTRAMUSCULAR ONCE
Qty: 0.5 ML | Refills: 0 | Status: CANCELLED | OUTPATIENT
Start: 2018-12-10 | End: 2018-12-10

## 2018-12-10 RX ORDER — CIPROFLOXACIN 250 MG/1
250 TABLET, FILM COATED ORAL 2 TIMES DAILY
Qty: 6 TABLET | Refills: 0 | Status: SHIPPED | OUTPATIENT
Start: 2018-12-10 | End: 2018-12-13

## 2018-12-10 RX ORDER — IRBESARTAN 150 MG/1
150 TABLET ORAL DAILY
Qty: 90 TABLET | Refills: 0 | Status: SHIPPED | OUTPATIENT
Start: 2018-12-10 | End: 2019-03-11 | Stop reason: SDUPTHER

## 2018-12-10 RX ORDER — MULTIVIT-MIN/IRON/FOLIC ACID/K 18-600-40
1 CAPSULE ORAL DAILY
Qty: 90 CAPSULE | Refills: 0 | Status: SHIPPED | OUTPATIENT
Start: 2018-12-10 | End: 2019-03-11 | Stop reason: SDUPTHER

## 2018-12-12 ENCOUNTER — HOSPITAL ENCOUNTER (OUTPATIENT)
Dept: GENERAL RADIOLOGY | Age: 68
Discharge: HOME | End: 2018-12-12
Payer: MEDICARE

## 2018-12-12 DIAGNOSIS — Z78.0 MENOPAUSE: ICD-10-CM

## 2018-12-12 LAB — URINE CULTURE, ROUTINE: NORMAL

## 2018-12-12 PROCEDURE — 77080 DXA BONE DENSITY AXIAL: CPT

## 2019-03-11 ENCOUNTER — OFFICE VISIT (OUTPATIENT)
Dept: INTERNAL MEDICINE CLINIC | Age: 69
End: 2019-03-11
Payer: MEDICARE

## 2019-03-11 VITALS
DIASTOLIC BLOOD PRESSURE: 82 MMHG | BODY MASS INDEX: 26.2 KG/M2 | HEIGHT: 70 IN | HEART RATE: 70 BPM | OXYGEN SATURATION: 97 % | TEMPERATURE: 97.8 F | SYSTOLIC BLOOD PRESSURE: 130 MMHG | WEIGHT: 183 LBS

## 2019-03-11 DIAGNOSIS — I10 ESSENTIAL HYPERTENSION: ICD-10-CM

## 2019-03-11 DIAGNOSIS — N39.498 OTHER URINARY INCONTINENCE: ICD-10-CM

## 2019-03-11 DIAGNOSIS — R12 HEARTBURN: ICD-10-CM

## 2019-03-11 DIAGNOSIS — E78.2 MIXED HYPERLIPIDEMIA: Primary | ICD-10-CM

## 2019-03-11 DIAGNOSIS — J30.89 OTHER ALLERGIC RHINITIS: ICD-10-CM

## 2019-03-11 DIAGNOSIS — E55.9 VITAMIN D DEFICIENCY: ICD-10-CM

## 2019-03-11 DIAGNOSIS — M85.88 OSTEOPENIA OF OTHER SITE: ICD-10-CM

## 2019-03-11 PROBLEM — M85.80 OSTEOPENIA: Status: ACTIVE | Noted: 2019-03-11

## 2019-03-11 PROCEDURE — 1123F ACP DISCUSS/DSCN MKR DOCD: CPT | Performed by: INTERNAL MEDICINE

## 2019-03-11 PROCEDURE — 1090F PRES/ABSN URINE INCON ASSESS: CPT | Performed by: INTERNAL MEDICINE

## 2019-03-11 PROCEDURE — 4040F PNEUMOC VAC/ADMIN/RCVD: CPT | Performed by: INTERNAL MEDICINE

## 2019-03-11 PROCEDURE — 99214 OFFICE O/P EST MOD 30 MIN: CPT | Performed by: INTERNAL MEDICINE

## 2019-03-11 PROCEDURE — G8427 DOCREV CUR MEDS BY ELIG CLIN: HCPCS | Performed by: INTERNAL MEDICINE

## 2019-03-11 PROCEDURE — 3017F COLORECTAL CA SCREEN DOC REV: CPT | Performed by: INTERNAL MEDICINE

## 2019-03-11 PROCEDURE — G8510 SCR DEP NEG, NO PLAN REQD: HCPCS | Performed by: INTERNAL MEDICINE

## 2019-03-11 PROCEDURE — 1101F PT FALLS ASSESS-DOCD LE1/YR: CPT | Performed by: INTERNAL MEDICINE

## 2019-03-11 PROCEDURE — G8399 PT W/DXA RESULTS DOCUMENT: HCPCS | Performed by: INTERNAL MEDICINE

## 2019-03-11 PROCEDURE — G8484 FLU IMMUNIZE NO ADMIN: HCPCS | Performed by: INTERNAL MEDICINE

## 2019-03-11 PROCEDURE — 0509F URINE INCON PLAN DOCD: CPT | Performed by: INTERNAL MEDICINE

## 2019-03-11 PROCEDURE — G8417 CALC BMI ABV UP PARAM F/U: HCPCS | Performed by: INTERNAL MEDICINE

## 2019-03-11 PROCEDURE — 1036F TOBACCO NON-USER: CPT | Performed by: INTERNAL MEDICINE

## 2019-03-11 RX ORDER — OXYBUTYNIN CHLORIDE 10 MG/1
10 TABLET, EXTENDED RELEASE ORAL DAILY
Qty: 30 TABLET | Refills: 3 | Status: SHIPPED | OUTPATIENT
Start: 2019-03-11 | End: 2019-06-17 | Stop reason: SINTOL

## 2019-03-11 RX ORDER — RANITIDINE 150 MG/1
150 TABLET ORAL 2 TIMES DAILY
Qty: 60 TABLET | Refills: 3 | Status: SHIPPED | OUTPATIENT
Start: 2019-03-11 | End: 2019-12-19 | Stop reason: ALTCHOICE

## 2019-03-11 RX ORDER — MULTIVIT-MIN/IRON/FOLIC ACID/K 18-600-40
1 CAPSULE ORAL DAILY
Qty: 90 CAPSULE | Refills: 0 | Status: SHIPPED | OUTPATIENT
Start: 2019-03-11 | End: 2019-06-17 | Stop reason: SDUPTHER

## 2019-03-11 RX ORDER — IRBESARTAN 150 MG/1
150 TABLET ORAL DAILY
Qty: 90 TABLET | Refills: 0 | Status: SHIPPED | OUTPATIENT
Start: 2019-03-11 | End: 2019-06-17 | Stop reason: SDUPTHER

## 2019-03-11 ASSESSMENT — PATIENT HEALTH QUESTIONNAIRE - PHQ9
SUM OF ALL RESPONSES TO PHQ QUESTIONS 1-9: 0
1. LITTLE INTEREST OR PLEASURE IN DOING THINGS: 0
2. FEELING DOWN, DEPRESSED OR HOPELESS: 0
SUM OF ALL RESPONSES TO PHQ9 QUESTIONS 1 & 2: 0
SUM OF ALL RESPONSES TO PHQ QUESTIONS 1-9: 0

## 2019-06-17 ENCOUNTER — OFFICE VISIT (OUTPATIENT)
Dept: INTERNAL MEDICINE CLINIC | Age: 69
End: 2019-06-17
Payer: MEDICARE

## 2019-06-17 VITALS
WEIGHT: 180 LBS | HEART RATE: 60 BPM | SYSTOLIC BLOOD PRESSURE: 130 MMHG | RESPIRATION RATE: 13 BRPM | DIASTOLIC BLOOD PRESSURE: 82 MMHG | TEMPERATURE: 98 F | BODY MASS INDEX: 25.77 KG/M2 | OXYGEN SATURATION: 97 % | HEIGHT: 70 IN

## 2019-06-17 DIAGNOSIS — E78.2 MIXED HYPERLIPIDEMIA: ICD-10-CM

## 2019-06-17 DIAGNOSIS — Z83.3 FAMILY HISTORY OF DIABETES MELLITUS (DM): ICD-10-CM

## 2019-06-17 DIAGNOSIS — I10 ESSENTIAL HYPERTENSION: ICD-10-CM

## 2019-06-17 DIAGNOSIS — E55.9 VITAMIN D DEFICIENCY: ICD-10-CM

## 2019-06-17 DIAGNOSIS — J30.89 OTHER ALLERGIC RHINITIS: ICD-10-CM

## 2019-06-17 DIAGNOSIS — Z01.818 PRE-OP EXAM: Primary | ICD-10-CM

## 2019-06-17 DIAGNOSIS — H26.8 OTHER CATARACT OF LEFT EYE: ICD-10-CM

## 2019-06-17 DIAGNOSIS — R12 HEARTBURN: ICD-10-CM

## 2019-06-17 DIAGNOSIS — N39.498 OTHER URINARY INCONTINENCE: ICD-10-CM

## 2019-06-17 DIAGNOSIS — Z01.818 PRE-OP EXAM: ICD-10-CM

## 2019-06-17 LAB
A/G RATIO: 1.8 (ref 1.1–2.2)
ALBUMIN SERPL-MCNC: 4.9 G/DL (ref 3.4–5)
ALP BLD-CCNC: 118 U/L (ref 40–129)
ALT SERPL-CCNC: 22 U/L (ref 10–40)
ANION GAP SERPL CALCULATED.3IONS-SCNC: 12 MMOL/L (ref 3–16)
AST SERPL-CCNC: 23 U/L (ref 15–37)
BASOPHILS ABSOLUTE: 0 K/UL (ref 0–0.2)
BASOPHILS RELATIVE PERCENT: 0.5 %
BILIRUB SERPL-MCNC: 0.7 MG/DL (ref 0–1)
BUN BLDV-MCNC: 11 MG/DL (ref 7–20)
CALCIUM SERPL-MCNC: 10.3 MG/DL (ref 8.3–10.6)
CHLORIDE BLD-SCNC: 102 MMOL/L (ref 99–110)
CHOLESTEROL, TOTAL: 200 MG/DL (ref 0–199)
CO2: 28 MMOL/L (ref 21–32)
CREAT SERPL-MCNC: 0.6 MG/DL (ref 0.6–1.2)
EOSINOPHILS ABSOLUTE: 0 K/UL (ref 0–0.6)
EOSINOPHILS RELATIVE PERCENT: 0 %
GFR AFRICAN AMERICAN: >60
GFR NON-AFRICAN AMERICAN: >60
GLOBULIN: 2.8 G/DL
GLUCOSE BLD-MCNC: 91 MG/DL (ref 70–99)
HCT VFR BLD CALC: 42.9 % (ref 36–48)
HDLC SERPL-MCNC: 57 MG/DL (ref 40–60)
HEMOGLOBIN: 14.3 G/DL (ref 12–16)
LDL CHOLESTEROL CALCULATED: 123 MG/DL
LYMPHOCYTES ABSOLUTE: 1.4 K/UL (ref 1–5.1)
LYMPHOCYTES RELATIVE PERCENT: 31.8 %
MCH RBC QN AUTO: 32 PG (ref 26–34)
MCHC RBC AUTO-ENTMCNC: 33.3 G/DL (ref 31–36)
MCV RBC AUTO: 96 FL (ref 80–100)
MONOCYTES ABSOLUTE: 0.4 K/UL (ref 0–1.3)
MONOCYTES RELATIVE PERCENT: 8.4 %
NEUTROPHILS ABSOLUTE: 2.6 K/UL (ref 1.7–7.7)
NEUTROPHILS RELATIVE PERCENT: 59.3 %
PDW BLD-RTO: 13.8 % (ref 12.4–15.4)
PLATELET # BLD: 250 K/UL (ref 135–450)
PMV BLD AUTO: 10.1 FL (ref 5–10.5)
POTASSIUM SERPL-SCNC: 4.9 MMOL/L (ref 3.5–5.1)
RBC # BLD: 4.47 M/UL (ref 4–5.2)
SODIUM BLD-SCNC: 142 MMOL/L (ref 136–145)
TOTAL PROTEIN: 7.7 G/DL (ref 6.4–8.2)
TRIGL SERPL-MCNC: 101 MG/DL (ref 0–150)
VITAMIN D 25-HYDROXY: 52.5 NG/ML
VLDLC SERPL CALC-MCNC: 20 MG/DL
WBC # BLD: 4.4 K/UL (ref 4–11)

## 2019-06-17 PROCEDURE — G8417 CALC BMI ABV UP PARAM F/U: HCPCS | Performed by: INTERNAL MEDICINE

## 2019-06-17 PROCEDURE — 0509F URINE INCON PLAN DOCD: CPT | Performed by: INTERNAL MEDICINE

## 2019-06-17 PROCEDURE — G8427 DOCREV CUR MEDS BY ELIG CLIN: HCPCS | Performed by: INTERNAL MEDICINE

## 2019-06-17 PROCEDURE — 4040F PNEUMOC VAC/ADMIN/RCVD: CPT | Performed by: INTERNAL MEDICINE

## 2019-06-17 PROCEDURE — 99215 OFFICE O/P EST HI 40 MIN: CPT | Performed by: INTERNAL MEDICINE

## 2019-06-17 PROCEDURE — 1036F TOBACCO NON-USER: CPT | Performed by: INTERNAL MEDICINE

## 2019-06-17 PROCEDURE — 1123F ACP DISCUSS/DSCN MKR DOCD: CPT | Performed by: INTERNAL MEDICINE

## 2019-06-17 PROCEDURE — 1090F PRES/ABSN URINE INCON ASSESS: CPT | Performed by: INTERNAL MEDICINE

## 2019-06-17 PROCEDURE — G8399 PT W/DXA RESULTS DOCUMENT: HCPCS | Performed by: INTERNAL MEDICINE

## 2019-06-17 PROCEDURE — 93000 ELECTROCARDIOGRAM COMPLETE: CPT | Performed by: INTERNAL MEDICINE

## 2019-06-17 PROCEDURE — 3017F COLORECTAL CA SCREEN DOC REV: CPT | Performed by: INTERNAL MEDICINE

## 2019-06-17 RX ORDER — MULTIVIT-MIN/IRON/FOLIC ACID/K 18-600-40
1 CAPSULE ORAL DAILY
Qty: 90 CAPSULE | Refills: 0 | Status: SHIPPED | OUTPATIENT
Start: 2019-06-17 | End: 2019-09-17 | Stop reason: SDUPTHER

## 2019-06-17 RX ORDER — IRBESARTAN 150 MG/1
150 TABLET ORAL DAILY
Qty: 90 TABLET | Refills: 0 | Status: SHIPPED | OUTPATIENT
Start: 2019-06-17 | End: 2019-09-17 | Stop reason: SDUPTHER

## 2019-06-17 NOTE — PROGRESS NOTES
SUBJECTIVE:  Laura Vaca is a 79 y.o. female [address]   Chief Complaint   Patient presents with    Pre-op Exam        PT HERE FOR EVAL / PREOP XAM    PREOP EXAM PER REQUEST OF DR Sarabia Failing  DENIES COUGH, NO F/C, NO INCREASED BLEEDING TENDENCY    CATARACT LT - PLAN FOR SURGERY - 7/8/19      HTN - TAKING MED . DENIES HEADACHE  , DIZZINESS No.   HLP - + DIET / EXERCISE COMPLIANCE. LABS D/W PT  URI INCONTINENCE - STATES DID NOT TOLERATE. STATES HAS NOT BEEN TAKING MED  VIT D DEF - TAKING MED. LABS D/W PT  HEARTBURN - INTERMITTENT.  TAKING RANITIDINE HELPING  ALLERGIC RHINITIS -  NO RHINORRHEA, NO NASAL CONGESTION, NO POSTNASAL DRAINAGE , NO SINUS PRESSURE, NO HA, NO SNEEZING, NO WATERY ITCHY EYES.   + FH DM - D/W PT    DENIES CP, No SOB, No PALPITATIONS, No COUGH  No ABD PAIN, No N/V, No DIARRHEA, No CONSTIPATION, No MELENA, No HEMATOCHEZIA. No DYSURIA, No FREQ, No URGENCY, No HEMATURIA    PMH: REVIEWED AND UPDATED TODAY    PSH: REVIEWED AND UPDATED TODAY    SOCIAL HX: REVIEWED AND UPDATED TODAY    FAMILY HX: REVIEWED AND UPDATED TODAY    ALLERGY:  Celebrex [celecoxib]; Sulfa antibiotics; and Codeine    MEDS: REVIEWED  Prior to Visit Medications    Medication Sig Taking?  Authorizing Provider   irbesartan (AVAPRO) 150 MG tablet Take 1 tablet by mouth daily Yes Awa Turpin MD   Cholecalciferol (VITAMIN D) 2000 units CAPS capsule Take 1 capsule by mouth daily Yes Awa Turpin MD   ranitidine (ZANTAC) 150 MG tablet Take 1 tablet by mouth 2 times daily Yes Awa Turpin MD   calcium carbonate-vitamin D (CALTRATE 600+D) 600-400 MG-UNIT TABS per tab Take 1 tablet by mouth daily Yes Awa Turpin MD   Carboxymethylcellul-Glycerin (REFRESH OPTIVE) 0.5-0.9 % SOLN Apply to eye Yes Historical Provider, MD   naproxen (NAPROSYN) 500 MG tablet Take 1 tablet by mouth 2 times daily as needed for Pain Yes Awa Turpin MD   fluticasone (FLONASE) 50 MCG/ACT nasal spray 2 sprays by Nasal route daily as needed for Rhinitis EXERCISES  BLADDER TRAINING EXERCISES. MAKE CHANGES AS NEEDED. 6. Vitamin D deficiency  COUNSELLED. MONITOR ON VIT D SUPPLEMENT. MAKE CHANGES AS NEEDED. 7. Heartburn  COUNSELLED. CONTINUE MGT. ANTIREFLUX PRECAUTIONS ADVISED. MONITOR. MAKE CHANGES AS NEEDED. 8. Other allergic rhinitis  COUNSELLED. STABLE. MED PRN. MONITOR  MAKE CHANGES AS NEEDED. 9. Family history of diabetes mellitus (DM)  COUNSELLED. LABS TO EVAL. ADVISED RISK FACTOR MODIFICATION. MAKE CHANGES AS NEEDED. Harshad Navarro received counseling on the following healthy behaviors: nutrition, exercise and medication adherence    Patient given educational materials on Hyperlipidemia, Nutrition, Exercise and Hypertension    I have instructed Harshad Navarro to complete a self tracking handout on Blood Pressures  and Weights and instructed them to bring it with them to her next appointment. Discussed use, benefit, and side effects of prescribed medications. Barriers to medication compliance addressed. All patient questions answered. Pt voiced understanding.                MEDICATION SIDE EFFECTS D/W PATIENT    PT STABLE AT TIME OF D/C.    RETURN TO CLINIC WITHIN 3 MONTHS / PRN    FOLLOW UP FOR FASTING LABS

## 2019-06-17 NOTE — LETTER
[facility] Internal Medicine  [address]  [address] [place] [address]  Phone: [phone]  Fax: [phone]    Dana Pryor MD        June 17, 2019     Dr. Mee Osei:    Patient: Fany Carrasco   MR Number: [id number]   YOB: 1950   Date of Visit: 6/17/2019       Dear Dr. Mee Osei : Thank you for referring Fany Carrasco to me for preop evaluation. EKG done - no acute finding   - see copy  Labs ordered [facility] - please follow  Advise close indu / post op monitoring  Please see completed preop form  She is otherwise OK to proceed with planned procedure. If you have questions, please do not hesitate to call me.      Sincerely,         Dana Pryor MD

## 2019-06-18 LAB
ESTIMATED AVERAGE GLUCOSE: 114 MG/DL
HBA1C MFR BLD: 5.6 %

## 2019-09-17 ENCOUNTER — OFFICE VISIT (OUTPATIENT)
Dept: INTERNAL MEDICINE CLINIC | Age: 69
End: 2019-09-17
Payer: MEDICARE

## 2019-09-17 VITALS
HEART RATE: 73 BPM | WEIGHT: 178.6 LBS | TEMPERATURE: 98.2 F | DIASTOLIC BLOOD PRESSURE: 80 MMHG | SYSTOLIC BLOOD PRESSURE: 130 MMHG | OXYGEN SATURATION: 98 % | HEIGHT: 70 IN | BODY MASS INDEX: 25.57 KG/M2

## 2019-09-17 DIAGNOSIS — Z83.3 FAMILY HISTORY OF DIABETES MELLITUS (DM): ICD-10-CM

## 2019-09-17 DIAGNOSIS — N39.498 OTHER URINARY INCONTINENCE: ICD-10-CM

## 2019-09-17 DIAGNOSIS — I10 ESSENTIAL HYPERTENSION: Primary | ICD-10-CM

## 2019-09-17 DIAGNOSIS — J30.89 OTHER ALLERGIC RHINITIS: ICD-10-CM

## 2019-09-17 DIAGNOSIS — H53.9 VISUAL DISTURBANCE: ICD-10-CM

## 2019-09-17 DIAGNOSIS — E78.2 MIXED HYPERLIPIDEMIA: ICD-10-CM

## 2019-09-17 DIAGNOSIS — E55.9 VITAMIN D DEFICIENCY: ICD-10-CM

## 2019-09-17 DIAGNOSIS — R12 HEARTBURN: ICD-10-CM

## 2019-09-17 LAB
CREATININE URINE: 197.2 MG/DL (ref 28–259)
MICROALBUMIN UR-MCNC: <1.2 MG/DL
MICROALBUMIN/CREAT UR-RTO: NORMAL MG/G (ref 0–30)

## 2019-09-17 PROCEDURE — 1123F ACP DISCUSS/DSCN MKR DOCD: CPT | Performed by: INTERNAL MEDICINE

## 2019-09-17 PROCEDURE — 4040F PNEUMOC VAC/ADMIN/RCVD: CPT | Performed by: INTERNAL MEDICINE

## 2019-09-17 PROCEDURE — G8417 CALC BMI ABV UP PARAM F/U: HCPCS | Performed by: INTERNAL MEDICINE

## 2019-09-17 PROCEDURE — 1036F TOBACCO NON-USER: CPT | Performed by: INTERNAL MEDICINE

## 2019-09-17 PROCEDURE — 3017F COLORECTAL CA SCREEN DOC REV: CPT | Performed by: INTERNAL MEDICINE

## 2019-09-17 PROCEDURE — 1090F PRES/ABSN URINE INCON ASSESS: CPT | Performed by: INTERNAL MEDICINE

## 2019-09-17 PROCEDURE — 0509F URINE INCON PLAN DOCD: CPT | Performed by: INTERNAL MEDICINE

## 2019-09-17 PROCEDURE — G8399 PT W/DXA RESULTS DOCUMENT: HCPCS | Performed by: INTERNAL MEDICINE

## 2019-09-17 PROCEDURE — G8427 DOCREV CUR MEDS BY ELIG CLIN: HCPCS | Performed by: INTERNAL MEDICINE

## 2019-09-17 PROCEDURE — 99214 OFFICE O/P EST MOD 30 MIN: CPT | Performed by: INTERNAL MEDICINE

## 2019-09-17 RX ORDER — IRBESARTAN 150 MG/1
150 TABLET ORAL DAILY
Qty: 90 TABLET | Refills: 0 | Status: SHIPPED | OUTPATIENT
Start: 2019-09-17 | End: 2019-12-19 | Stop reason: SDUPTHER

## 2019-09-17 RX ORDER — SILICONE ADHESIVE 1.5" X 3"
1 SHEET (EA) TOPICAL EVERY 4 HOURS
COMMUNITY

## 2019-09-17 RX ORDER — MULTIVIT-MIN/IRON/FOLIC ACID/K 18-600-40
1 CAPSULE ORAL DAILY
Qty: 90 CAPSULE | Refills: 0 | Status: SHIPPED | OUTPATIENT
Start: 2019-09-17 | End: 2019-12-19 | Stop reason: SDUPTHER

## 2019-12-19 ENCOUNTER — OFFICE VISIT (OUTPATIENT)
Dept: INTERNAL MEDICINE CLINIC | Age: 69
End: 2019-12-19
Payer: MEDICARE

## 2019-12-19 VITALS
WEIGHT: 180 LBS | SYSTOLIC BLOOD PRESSURE: 130 MMHG | BODY MASS INDEX: 25.77 KG/M2 | HEART RATE: 69 BPM | DIASTOLIC BLOOD PRESSURE: 78 MMHG | TEMPERATURE: 98.2 F | OXYGEN SATURATION: 97 % | HEIGHT: 70 IN

## 2019-12-19 DIAGNOSIS — E55.9 VITAMIN D DEFICIENCY: ICD-10-CM

## 2019-12-19 DIAGNOSIS — E78.2 MIXED HYPERLIPIDEMIA: Primary | ICD-10-CM

## 2019-12-19 DIAGNOSIS — J30.89 OTHER ALLERGIC RHINITIS: ICD-10-CM

## 2019-12-19 DIAGNOSIS — R12 HEARTBURN: ICD-10-CM

## 2019-12-19 DIAGNOSIS — I10 ESSENTIAL HYPERTENSION: ICD-10-CM

## 2019-12-19 DIAGNOSIS — N39.498 OTHER URINARY INCONTINENCE: ICD-10-CM

## 2019-12-19 PROCEDURE — 1123F ACP DISCUSS/DSCN MKR DOCD: CPT | Performed by: INTERNAL MEDICINE

## 2019-12-19 PROCEDURE — 4040F PNEUMOC VAC/ADMIN/RCVD: CPT | Performed by: INTERNAL MEDICINE

## 2019-12-19 PROCEDURE — 1036F TOBACCO NON-USER: CPT | Performed by: INTERNAL MEDICINE

## 2019-12-19 PROCEDURE — G8484 FLU IMMUNIZE NO ADMIN: HCPCS | Performed by: INTERNAL MEDICINE

## 2019-12-19 PROCEDURE — 99214 OFFICE O/P EST MOD 30 MIN: CPT | Performed by: INTERNAL MEDICINE

## 2019-12-19 PROCEDURE — 0509F URINE INCON PLAN DOCD: CPT | Performed by: INTERNAL MEDICINE

## 2019-12-19 PROCEDURE — 1090F PRES/ABSN URINE INCON ASSESS: CPT | Performed by: INTERNAL MEDICINE

## 2019-12-19 PROCEDURE — 3017F COLORECTAL CA SCREEN DOC REV: CPT | Performed by: INTERNAL MEDICINE

## 2019-12-19 PROCEDURE — G8417 CALC BMI ABV UP PARAM F/U: HCPCS | Performed by: INTERNAL MEDICINE

## 2019-12-19 PROCEDURE — G8427 DOCREV CUR MEDS BY ELIG CLIN: HCPCS | Performed by: INTERNAL MEDICINE

## 2019-12-19 PROCEDURE — G8399 PT W/DXA RESULTS DOCUMENT: HCPCS | Performed by: INTERNAL MEDICINE

## 2019-12-19 RX ORDER — FAMOTIDINE 20 MG/1
20 TABLET, FILM COATED ORAL 2 TIMES DAILY
Qty: 60 TABLET | Refills: 1 | Status: SHIPPED | OUTPATIENT
Start: 2019-12-19 | End: 2021-01-19 | Stop reason: SDUPTHER

## 2019-12-19 RX ORDER — IRBESARTAN 150 MG/1
150 TABLET ORAL DAILY
Qty: 90 TABLET | Refills: 0 | Status: SHIPPED | OUTPATIENT
Start: 2019-12-19 | End: 2020-03-16

## 2019-12-19 RX ORDER — MULTIVIT-MIN/IRON/FOLIC ACID/K 18-600-40
1 CAPSULE ORAL DAILY
Qty: 90 CAPSULE | Refills: 0 | Status: SHIPPED | OUTPATIENT
Start: 2019-12-19 | End: 2020-06-10 | Stop reason: SDUPTHER

## 2020-01-27 ENCOUNTER — OFFICE VISIT (OUTPATIENT)
Dept: INTERNAL MEDICINE CLINIC | Age: 70
End: 2020-01-27
Payer: MEDICARE

## 2020-01-27 VITALS
HEIGHT: 70 IN | TEMPERATURE: 97.8 F | OXYGEN SATURATION: 99 % | WEIGHT: 171 LBS | DIASTOLIC BLOOD PRESSURE: 80 MMHG | BODY MASS INDEX: 24.48 KG/M2 | HEART RATE: 60 BPM | SYSTOLIC BLOOD PRESSURE: 122 MMHG

## 2020-01-27 DIAGNOSIS — Z00.00 ROUTINE GENERAL MEDICAL EXAMINATION AT A HEALTH CARE FACILITY: ICD-10-CM

## 2020-01-27 DIAGNOSIS — I10 ESSENTIAL HYPERTENSION: ICD-10-CM

## 2020-01-27 DIAGNOSIS — E78.2 MIXED HYPERLIPIDEMIA: ICD-10-CM

## 2020-01-27 DIAGNOSIS — N39.498 OTHER URINARY INCONTINENCE: ICD-10-CM

## 2020-01-27 LAB
A/G RATIO: 1.8 (ref 1.1–2.2)
ALBUMIN SERPL-MCNC: 4.9 G/DL (ref 3.4–5)
ALP BLD-CCNC: 97 U/L (ref 40–129)
ALT SERPL-CCNC: 20 U/L (ref 10–40)
ANION GAP SERPL CALCULATED.3IONS-SCNC: 18 MMOL/L (ref 3–16)
AST SERPL-CCNC: 17 U/L (ref 15–37)
BASOPHILS ABSOLUTE: 0 K/UL (ref 0–0.2)
BASOPHILS RELATIVE PERCENT: 0.6 %
BILIRUB SERPL-MCNC: 0.7 MG/DL (ref 0–1)
BILIRUBIN URINE: NEGATIVE
BLOOD, URINE: NEGATIVE
BUN BLDV-MCNC: 11 MG/DL (ref 7–20)
CALCIUM SERPL-MCNC: 10.3 MG/DL (ref 8.3–10.6)
CHLORIDE BLD-SCNC: 99 MMOL/L (ref 99–110)
CHOLESTEROL, TOTAL: 213 MG/DL (ref 0–199)
CLARITY: CLEAR
CO2: 25 MMOL/L (ref 21–32)
COLOR: YELLOW
CREAT SERPL-MCNC: 0.7 MG/DL (ref 0.6–1.2)
EOSINOPHILS ABSOLUTE: 0 K/UL (ref 0–0.6)
EOSINOPHILS RELATIVE PERCENT: 0 %
EPITHELIAL CELLS, UA: 1 /HPF (ref 0–5)
GFR AFRICAN AMERICAN: >60
GFR NON-AFRICAN AMERICAN: >60
GLOBULIN: 2.8 G/DL
GLUCOSE BLD-MCNC: 86 MG/DL (ref 70–99)
GLUCOSE URINE: NEGATIVE MG/DL
HCT VFR BLD CALC: 45.3 % (ref 36–48)
HDLC SERPL-MCNC: 63 MG/DL (ref 40–60)
HEMOGLOBIN: 15.1 G/DL (ref 12–16)
HYALINE CASTS: 0 /LPF (ref 0–8)
KETONES, URINE: NEGATIVE MG/DL
LDL CHOLESTEROL CALCULATED: 133 MG/DL
LEUKOCYTE ESTERASE, URINE: ABNORMAL
LYMPHOCYTES ABSOLUTE: 1.6 K/UL (ref 1–5.1)
LYMPHOCYTES RELATIVE PERCENT: 28.1 %
MCH RBC QN AUTO: 32.3 PG (ref 26–34)
MCHC RBC AUTO-ENTMCNC: 33.3 G/DL (ref 31–36)
MCV RBC AUTO: 97 FL (ref 80–100)
MICROSCOPIC EXAMINATION: YES
MONOCYTES ABSOLUTE: 0.5 K/UL (ref 0–1.3)
MONOCYTES RELATIVE PERCENT: 8.2 %
NEUTROPHILS ABSOLUTE: 3.6 K/UL (ref 1.7–7.7)
NEUTROPHILS RELATIVE PERCENT: 63.1 %
NITRITE, URINE: NEGATIVE
PDW BLD-RTO: 13.8 % (ref 12.4–15.4)
PH UA: 5.5 (ref 5–8)
PLATELET # BLD: 262 K/UL (ref 135–450)
PMV BLD AUTO: 10.3 FL (ref 5–10.5)
POTASSIUM SERPL-SCNC: 4.7 MMOL/L (ref 3.5–5.1)
PROTEIN UA: NEGATIVE MG/DL
RBC # BLD: 4.67 M/UL (ref 4–5.2)
RBC UA: 1 /HPF (ref 0–4)
SODIUM BLD-SCNC: 142 MMOL/L (ref 136–145)
SPECIFIC GRAVITY UA: 1.02 (ref 1–1.03)
TOTAL PROTEIN: 7.7 G/DL (ref 6.4–8.2)
TRIGL SERPL-MCNC: 83 MG/DL (ref 0–150)
TSH REFLEX: 0.47 UIU/ML (ref 0.27–4.2)
URINE TYPE: ABNORMAL
UROBILINOGEN, URINE: 0.2 E.U./DL
VLDLC SERPL CALC-MCNC: 17 MG/DL
WBC # BLD: 5.7 K/UL (ref 4–11)
WBC UA: 8 /HPF (ref 0–5)

## 2020-01-27 PROCEDURE — 1123F ACP DISCUSS/DSCN MKR DOCD: CPT | Performed by: INTERNAL MEDICINE

## 2020-01-27 PROCEDURE — G8484 FLU IMMUNIZE NO ADMIN: HCPCS | Performed by: INTERNAL MEDICINE

## 2020-01-27 PROCEDURE — G0438 PPPS, INITIAL VISIT: HCPCS | Performed by: INTERNAL MEDICINE

## 2020-01-27 PROCEDURE — 4040F PNEUMOC VAC/ADMIN/RCVD: CPT | Performed by: INTERNAL MEDICINE

## 2020-01-27 PROCEDURE — 3017F COLORECTAL CA SCREEN DOC REV: CPT | Performed by: INTERNAL MEDICINE

## 2020-01-27 RX ORDER — SOLIFENACIN SUCCINATE 5 MG/1
5 TABLET, FILM COATED ORAL DAILY
Qty: 30 TABLET | Refills: 3 | Status: SHIPPED | OUTPATIENT
Start: 2020-01-27 | End: 2020-06-10 | Stop reason: SINTOL

## 2020-01-27 RX ORDER — LORATADINE 10 MG/1
10 TABLET ORAL DAILY PRN
Qty: 30 TABLET | Refills: 3 | Status: SHIPPED | OUTPATIENT
Start: 2020-01-27 | End: 2021-05-13

## 2020-01-27 ASSESSMENT — PATIENT HEALTH QUESTIONNAIRE - PHQ9
2. FEELING DOWN, DEPRESSED OR HOPELESS: 0
1. LITTLE INTEREST OR PLEASURE IN DOING THINGS: 0
SUM OF ALL RESPONSES TO PHQ QUESTIONS 1-9: 0
SUM OF ALL RESPONSES TO PHQ9 QUESTIONS 1 & 2: 0
SUM OF ALL RESPONSES TO PHQ QUESTIONS 1-9: 0

## 2020-01-27 ASSESSMENT — LIFESTYLE VARIABLES: HOW OFTEN DO YOU HAVE A DRINK CONTAINING ALCOHOL: 0

## 2020-01-27 NOTE — PATIENT INSTRUCTIONS
TAKE MED AS ADVISED    DIET/ EXERCISE. FOLLOW UP WITHIN 2- 3 MONTHS / AS NEEDED    FOLLOW UP FOR FASTING LABS    Personalized Preventive Plan for Abiola Marin - 1/27/2020  Medicare offers a range of preventive health benefits. Some of the tests and screenings are paid in full while other may be subject to a deductible, co-insurance, and/or copay. Some of these benefits include a comprehensive review of your medical history including lifestyle, illnesses that may run in your family, and various assessments and screenings as appropriate. After reviewing your medical record and screening and assessments performed today your provider may have ordered immunizations, labs, imaging, and/or referrals for you. A list of these orders (if applicable) as well as your Preventive Care list are included within your After Visit Summary for your review. Other Preventive Recommendations:    · A preventive eye exam performed by an eye specialist is recommended every 1-2 years to screen for glaucoma; cataracts, macular degeneration, and other eye disorders. · A preventive dental visit is recommended every 6 months. · Try to get at least 150 minutes of exercise per week or 10,000 steps per day on a pedometer . · Order or download the FREE \"Exercise & Physical Activity: Your Everyday Guide\" from [employer]. Call 3-665.254.3144 or search [employer] online. · You need 2938-9352 mg of calcium and 1250-0542 IU of vitamin D per day. It is possible to meet your calcium requirement with diet alone, but a vitamin D supplement is usually necessary to meet this goal.  · When exposed to the sun, use a sunscreen that protects against both UVA and UVB radiation with an SPF of 30 or greater. Reapply every 2 to 3 hours or after sweating, drying off with a towel, or swimming. · Always wear a seat belt when traveling in a car. Always wear a helmet when riding a bicycle or motorcycle.

## 2020-01-27 NOTE — PROGRESS NOTES
Medicare Annual Wellness Visit  Name: Kiki Valderrama Date: 2020   MRN: [id number] Sex: Female   Age: 81 y.o. Ethnicity: Non-/Non    : 1950 Race: Black      Sebastian Escobedo is here for Medicare AWV; Hypertension; and Hyperlipidemia  PT HERE FOR EVAL / MEDICARE AWV    LAST PHYSICAL > 1 YR  HTN - TAKING MED - TOLERATING. BP ELEVATED+ DIET / EXERCISE COMPLIANCE. DENIES HEADACHE  , DIZZINESS No.   HLP - + DIET / EXERCISE COMPLIANCE. PREVIOUS LABS D/W PT  VIT D DEF - TAKING MED. LABS D/W PT  URI INCONTINENCE -  PERSISTENT. STATES KEGEL'S EXERCISE HELPING SOME. Anthony Noah.  TAKING MED - HELPING  ALLERGIC RHINITIS -  NO RHINORRHEA, OCC NASAL CONGESTION, NO POSTNASAL DRAINAGE , NO SINUS PRESSURE, NO HA, OCC SNEEZING, + WATERY ITCHY EYES.   C/O PAIN - LT AXILLA - ? DURATION. ? Lurlene Copas. ? PAIN SCALE  SITUATIONAL STRESS  - FAMILY ISSUES. ? NO DEPRESSION. DENIES INSOMNIA. DENIES SUICIDAL / NO HOMICIDAL THOUGHTS / IDEATION      DENIES CP, No SOB, No PALPITATIONS, No COUGH  No ABD PAIN, No N/V, No DIARRHEA, No CONSTIPATION, No MELENA, No HEMATOCHEZIA. No DYSURIA, No FREQ, + URGENCY - OLD, No HEMATURIA    ROS: COMPREHENSIVE ROS AS IN HX, REST -VE  History obtained from chart review and the patient      Screenings for behavioral, psychosocial and functional/safety risks, and cognitive dysfunction are all negative except as indicated below. These results, as well as other patient data from the [address] form, are documented in Flowsheets linked to this Encounter. Allergies   Allergen Reactions    Celebrex [Celecoxib] Other (See Comments)     Headache    Sulfa Antibiotics     Codeine      TYLENIOL # 3         Prior to Visit Medications    Medication Sig Taking?  Authorizing Provider   famotidine (PEPCID) 20 MG tablet Take 1 tablet by mouth 2 times daily Yes Theo Watson MD   irbesartan (AVAPRO) 150 MG tablet Take 1 tablet by mouth daily Yes Theo Watson MD Cholecalciferol (VITAMIN D) 50 MCG (2000 UT) CAPS capsule Take 1 capsule by mouth daily Yes Dada Marcus MD   sodium chloride (YULIYA 128) 5 % ophthalmic solution Place 1 drop into the left eye every 4 hours Yes Historical Provider, MD   calcium carbonate-vitamin D (CALTRATE 600+D) 600-400 MG-UNIT TABS per tab Take 1 tablet by mouth daily Yes Dada Marcus MD   Carboxymethylcellul-Glycerin (REFRESH OPTIVE) 0.5-0.9 % SOLN Apply to eye Yes Historical Provider, MD   fluticasone (FLONASE) 50 MCG/ACT nasal spray 2 sprays by Nasal route daily as needed for Rhinitis Yes Dada Marcus MD         Past Medical History:   Diagnosis Date    Anemia     Asthma     CHILDHOOD    Bone lesion     Hyperlipidemia     Hypertension     Thyroid disorder        Past Surgical History:   Procedure Laterality Date    BREAST BIOPSY      DOMINICK    DILATION AND CURETTAGE OF UTERUS      MULTIPLE    EYE SURGERY      KNEE ARTHROSCOPY      RT FOR MENISCEAL TEAR    UTERINE FIBROID EMBOLIZATION           Family History   Problem Relation Age of Onset    Diabetes Mother     Cancer Mother         OVARIAN    Heart Disease Sister     Kidney Disease Sister     High Blood Pressure Sister     Diabetes Maternal Grandmother     Cancer Maternal Grandmother         STOMACH    Stroke Maternal Grandmother     Heart Disease Maternal Grandmother     High Blood Pressure Maternal Grandmother     Heart Disease Other         GRANDMOTHER    Cancer Brother 77        COLON    High Blood Pressure Brother        CareTeam (Including outside providers/suppliers regularly involved in providing care):   Patient Care Team:  Dada Marcus MD as PCP - General (Internal Medicine)  Dada Marcus MD as PCP - REHABILITATION [facility] Empaneled Provider    Wt Readings from Last 3 Encounters:   01/27/20 171 lb (77.6 kg)   12/19/19 180 lb (81.6 kg)   09/17/19 178 lb 9.6 oz (81 kg)     Vitals:    01/27/20 1350 01/27/20 1405   BP: (!) 150/90 (!) 140/90   Site: Left Upper schedule for the next 5-10 years is provided to the patient in written form: see Patient Jose Sutton was seen today for medicare awv, hypertension and hyperlipidemia.

## 2020-01-28 ENCOUNTER — TELEPHONE (OUTPATIENT)
Dept: INTERNAL MEDICINE CLINIC | Age: 70
End: 2020-01-28

## 2020-01-28 RX ORDER — CIPROFLOXACIN 250 MG/1
250 TABLET, FILM COATED ORAL 2 TIMES DAILY
Qty: 6 TABLET | Refills: 0 | Status: SHIPPED | OUTPATIENT
Start: 2020-01-28 | End: 2020-01-31

## 2020-03-16 RX ORDER — IRBESARTAN 150 MG/1
150 TABLET ORAL DAILY
Qty: 90 TABLET | Refills: 0 | Status: SHIPPED | OUTPATIENT
Start: 2020-03-16 | End: 2020-06-10 | Stop reason: SDUPTHER

## 2020-06-10 ENCOUNTER — VIRTUAL VISIT (OUTPATIENT)
Dept: INTERNAL MEDICINE CLINIC | Age: 70
End: 2020-06-10
Payer: MEDICARE

## 2020-06-10 PROCEDURE — 1090F PRES/ABSN URINE INCON ASSESS: CPT | Performed by: INTERNAL MEDICINE

## 2020-06-10 PROCEDURE — 0509F URINE INCON PLAN DOCD: CPT | Performed by: INTERNAL MEDICINE

## 2020-06-10 PROCEDURE — G8427 DOCREV CUR MEDS BY ELIG CLIN: HCPCS | Performed by: INTERNAL MEDICINE

## 2020-06-10 PROCEDURE — 99214 OFFICE O/P EST MOD 30 MIN: CPT | Performed by: INTERNAL MEDICINE

## 2020-06-10 PROCEDURE — 1123F ACP DISCUSS/DSCN MKR DOCD: CPT | Performed by: INTERNAL MEDICINE

## 2020-06-10 PROCEDURE — 3017F COLORECTAL CA SCREEN DOC REV: CPT | Performed by: INTERNAL MEDICINE

## 2020-06-10 PROCEDURE — 4040F PNEUMOC VAC/ADMIN/RCVD: CPT | Performed by: INTERNAL MEDICINE

## 2020-06-10 PROCEDURE — G8399 PT W/DXA RESULTS DOCUMENT: HCPCS | Performed by: INTERNAL MEDICINE

## 2020-06-10 RX ORDER — IRBESARTAN 150 MG/1
150 TABLET ORAL DAILY
Qty: 90 TABLET | Refills: 0 | Status: SHIPPED | OUTPATIENT
Start: 2020-06-10 | End: 2020-08-13 | Stop reason: SDUPTHER

## 2020-06-10 RX ORDER — MULTIVIT-MIN/IRON/FOLIC ACID/K 18-600-40
1 CAPSULE ORAL DAILY
Qty: 90 CAPSULE | Refills: 0 | Status: SHIPPED | OUTPATIENT
Start: 2020-06-10 | End: 2020-08-13 | Stop reason: SDUPTHER

## 2020-06-10 ASSESSMENT — PATIENT HEALTH QUESTIONNAIRE - PHQ9
2. FEELING DOWN, DEPRESSED OR HOPELESS: 0
SUM OF ALL RESPONSES TO PHQ9 QUESTIONS 1 & 2: 0
SUM OF ALL RESPONSES TO PHQ QUESTIONS 1-9: 0
SUM OF ALL RESPONSES TO PHQ QUESTIONS 1-9: 0
1. LITTLE INTEREST OR PLEASURE IN DOING THINGS: 0

## 2020-06-10 NOTE — PROGRESS NOTES
pertinent observable physical exam findings-  AS ABOVE    PREVIOUS LABS  REVIEWED AND D/W PT      ASSESSMENT/PLAN:       Diagnosis Orders   1. Essential hypertension  COUNSELLED. CONTINUE MED. ADVISED LOW NA+ / DASH DIET/ EXERCISE. MONITOR. GOAL </= 120/80  MAKE CHANGES AS NEEDED. 2. Mixed hyperlipidemia  COUNSELLED. DEFERRED MED  ADVISED LOW FAT / CHOL DIET/ EXERCISE.  MONITOR. GOALS D/W PT.  MAKE CHANGES AS NEEDED. 3. Vitamin D deficiency  COUNSELLED. ADVISED MED COMPLIANCE - ADVISED VIT D 2000 U DAILY. MONITOR AND MAKE CHANGES AS NEEDED. 4. Other urinary incontinence  COUNSELLED. ADVISED KEGEL'S EXERCISES  BLADDER TRAINING EXERCISES. D/C [facility] - PT DID NOT TOLERATE. DEFERRED MED CHANGE  MAKE CHANGES AS NEEDED. 5. Heartburn  COUNSELLED. CONTINUE MGT. ANTIREFLUX PRECAUTIONS ADVISED. MONITOR. MAKE CHANGES AS NEEDED. 6. Other allergic rhinitis  COUNSELLED. MED PRN. MONITOR  MAKE CHANGES AS NEEDED. 7. Pain in axilla, left  COUNSELLED. Marijean Fallow EXERCISES. ANALGESICS PRN. MONITOR. DICLOFENAC GEL PRN  MAKE CHANGES AS NEEDED. 8. Situational stress  COUNSELLED. IMPROVED. PT COUNSELLED  ON RELAXATION TECHNIQUES. ADDRESSED STRESS MGT. MONITOR  PT NOT SUICIDAL/ NOT HOMICIDAL  MAKE CHANGES AS NEEDED. MEDICATION SIDE EFFECTS D/W PATIENT     PT STABLE AT TIME OF D/C.     RETURN TO CLINIC WITHIN  3 MONTHS / PRN       Due to this being a TeleHealth encounter (During UXPUG-29 public health emergency), evaluation of the following organ systems was limited: Vitals/Constitutional/EENT/Resp/CV/GI//MS/Neuro/Skin/Heme-Lymph-Imm.   Pursuant to the emergency declaration under the [address] [facility], [address] [place] [address] authority and the [employer] Act, this Virtual Visit was conducted with patient's (and/or legal guardian's) consent, to reduce the patient's risk of exposure to COVID-19 and provide necessary

## 2020-08-13 ENCOUNTER — VIRTUAL VISIT (OUTPATIENT)
Dept: INTERNAL MEDICINE CLINIC | Age: 70
End: 2020-08-13
Payer: MEDICARE

## 2020-08-13 PROCEDURE — 4040F PNEUMOC VAC/ADMIN/RCVD: CPT | Performed by: INTERNAL MEDICINE

## 2020-08-13 PROCEDURE — 1090F PRES/ABSN URINE INCON ASSESS: CPT | Performed by: INTERNAL MEDICINE

## 2020-08-13 PROCEDURE — 1123F ACP DISCUSS/DSCN MKR DOCD: CPT | Performed by: INTERNAL MEDICINE

## 2020-08-13 PROCEDURE — 0509F URINE INCON PLAN DOCD: CPT | Performed by: INTERNAL MEDICINE

## 2020-08-13 PROCEDURE — G8399 PT W/DXA RESULTS DOCUMENT: HCPCS | Performed by: INTERNAL MEDICINE

## 2020-08-13 PROCEDURE — 3017F COLORECTAL CA SCREEN DOC REV: CPT | Performed by: INTERNAL MEDICINE

## 2020-08-13 PROCEDURE — G8427 DOCREV CUR MEDS BY ELIG CLIN: HCPCS | Performed by: INTERNAL MEDICINE

## 2020-08-13 PROCEDURE — 99214 OFFICE O/P EST MOD 30 MIN: CPT | Performed by: INTERNAL MEDICINE

## 2020-08-13 RX ORDER — CEPHALEXIN 500 MG/1
500 CAPSULE ORAL 4 TIMES DAILY
Qty: 28 CAPSULE | Refills: 0 | Status: SHIPPED | OUTPATIENT
Start: 2020-08-13 | End: 2020-10-14 | Stop reason: ALTCHOICE

## 2020-08-13 RX ORDER — MULTIVIT-MIN/IRON/FOLIC ACID/K 18-600-40
1 CAPSULE ORAL DAILY
Qty: 90 CAPSULE | Refills: 0 | Status: SHIPPED | OUTPATIENT
Start: 2020-08-13 | End: 2020-10-14 | Stop reason: SDUPTHER

## 2020-08-13 RX ORDER — IRBESARTAN 150 MG/1
150 TABLET ORAL DAILY
Qty: 90 TABLET | Refills: 0 | Status: SHIPPED | OUTPATIENT
Start: 2020-08-13 | End: 2020-10-14 | Stop reason: SDUPTHER

## 2020-08-13 NOTE — PROGRESS NOTES
2020    TELEHEALTH EVALUATION -- Audio/Visual (During FTGAF-58 public health emergency)    PLACE OF SERVICE: PATIENT'S HOME    HPI: SEE BELOW    Brando Larson (:  1950) has requested an audio/video evaluation for the following concern(s):    HLP - + DIET / EXERCISE COMPLIANCE. PREVIOUS LABS D/W PT  HTN - TAKING MED .. + DIET / EXERCISE COMPLIANCE. OCC HEADACHE- IMPROVED  , DIZZINESS No.  C/O SWELLING - LT LEG. PAST 3 WEEKS. + PAIN, + REDNESS. DENIES TRAUMA, NO F/C  HEARTBURN -  IMPROVED. TAKING MED - HELPING  VIT D DEF - TAKING MED. LABS D/W PT  URI INCONTINENCE -  OFF MED. Loras Thayer KEGEL'S EXERCISE HELPING SOME. ALLERGIC RHINITIS -  NO RHINORRHEA, OCC NASAL CONGESTION, NO POSTNASAL DRAINAGE , NO SINUS PRESSURE, OCC HA- IMPROVED, OCC SNEEZING, + WATERY ITCHY EYES.   SITUATIONAL STRESS  -  RESOLVED        DENIES CP, No SOB, No PALPITATIONS, No COUGH, NO F/C  No ABD PAIN, No N/V, No DIARRHEA, No CONSTIPATION, No MELENA, No HEMATOCHEZIA. No DYSURIA, No FREQ, + URGENCY - OLD, No HEMATURIA       Allergies   Allergen Reactions    Celebrex [Celecoxib] Other (See Comments)     Headache    Sulfa Antibiotics     Codeine      TYLENIOL # 3         Prior to Visit Medications    Medication Sig Taking?  Authorizing Provider   irbesartan (AVAPRO) 150 MG tablet Take 1 tablet by mouth daily Yes Blaine Hodge MD   Cholecalciferol (VITAMIN D) 50 MCG ( UT) CAPS capsule Take 1 capsule by mouth daily Yes Blaine Hodge MD   diclofenac sodium 1 % GEL Apply 2 g topically 3 times daily as needed for Pain Yes Blaine Hodge MD   famotidine (PEPCID) 20 MG tablet Take 1 tablet by mouth 2 times daily Yes Blaine Hodge MD   sodium chloride (YULIYA 128) 5 % ophthalmic solution Place 1 drop into the left eye every 4 hours Yes Historical Provider, MD   calcium carbonate-vitamin D (CALTRATE 600+D) 600-400 MG-UNIT TABS per tab Take 1 tablet by mouth daily Yes Blaine Hodge MD   Carboxymethylcellul-Glycerin [facility] OPTIVE) 0.5-0.9 % SOLN Apply to eye Yes Historical Provider, MD   fluticasone (FLONASE) 50 MCG/ACT nasal spray 2 sprays by Nasal route daily as needed for Rhinitis Yes Chase Macias MD       Social History     Tobacco Use    Smoking status: Never Smoker    Smokeless tobacco: Never Used   Substance Use Topics    Alcohol use: No     Comment: QUIT > 20 YRS    Drug use: No        ROS: COMPREHENSIVE ROS AS IN HX, REST -VE  History obtained from chart review and the patient    PHYSICAL EXAMINATION:  [ INSTRUCTIONS:  \"[x]\" Indicates a positive item  \"[]\" Indicates a negative item  -- DELETE ALL ITEMS NOT EXAMINED]  Vital Signs: (As obtained by patient/caregiver or practitioner observation)    Blood pressure- 127/77 Heart rate- 76    Respiratory rate-    Temperature-  Pulse oximetry-     Constitutional: [x] Appears well-developed and well-nourished [x] No apparent distress      [] Abnormal-   Mental status  [x] Alert and awake  [x] Oriented to person/place/time [x]Able to follow commands      Eyes:  EOM    [x]  Normal  [] Abnormal-  Sclera  [x]  Normal  [] Abnormal -         Discharge [x]  None visible  [] Abnormal -    HENT:   [x] Normocephalic, atraumatic.   [] Abnormal   [x] Mouth/Throat: Mucous membranes are moist.     External Ears [x] Normal  [] Abnormal-     Neck: [x] No visualized mass     Pulmonary/Chest: [x] Respiratory effort normal.  [x] No visualized signs of difficulty breathing or respiratory distress        [] Abnormal-      Musculoskeletal:   [] Normal gait with no signs of ataxia         [x] Normal range of motion of neck        [] Abnormal-       Neurological:        [x] No Facial Asymmetry (Cranial nerve 7 motor function) (limited exam to video visit)          [x] No gaze palsy        [] Abnormal-         Skin:        [x] No significant exanthematous lesions or discoloration noted on facial skin         [] Abnormal-            Psychiatric:       [x] Normal Affect [x] No Hallucinations        [] also been advised to contact this office for worsening conditions or problems, and seek emergency medical treatment and/or call 911 if deemed necessary. Patient identification was verified at the start of the visit: Yes    Services were provided through a video synchronous discussion virtually to substitute for in-person clinic visit. Patient and provider were located at their individual homes. --Esther Heck MD on 8/13/2020 at 11:37 AM    An electronic signature was used to authenticate this note.

## 2020-10-14 ENCOUNTER — OFFICE VISIT (OUTPATIENT)
Dept: INTERNAL MEDICINE CLINIC | Age: 70
End: 2020-10-14
Payer: MEDICARE

## 2020-10-14 VITALS
DIASTOLIC BLOOD PRESSURE: 80 MMHG | HEART RATE: 66 BPM | TEMPERATURE: 97 F | WEIGHT: 177 LBS | OXYGEN SATURATION: 98 % | HEIGHT: 70 IN | SYSTOLIC BLOOD PRESSURE: 136 MMHG | BODY MASS INDEX: 25.34 KG/M2

## 2020-10-14 PROCEDURE — G8427 DOCREV CUR MEDS BY ELIG CLIN: HCPCS | Performed by: INTERNAL MEDICINE

## 2020-10-14 PROCEDURE — G8484 FLU IMMUNIZE NO ADMIN: HCPCS | Performed by: INTERNAL MEDICINE

## 2020-10-14 PROCEDURE — G8417 CALC BMI ABV UP PARAM F/U: HCPCS | Performed by: INTERNAL MEDICINE

## 2020-10-14 PROCEDURE — 4040F PNEUMOC VAC/ADMIN/RCVD: CPT | Performed by: INTERNAL MEDICINE

## 2020-10-14 PROCEDURE — 1036F TOBACCO NON-USER: CPT | Performed by: INTERNAL MEDICINE

## 2020-10-14 PROCEDURE — 0509F URINE INCON PLAN DOCD: CPT | Performed by: INTERNAL MEDICINE

## 2020-10-14 PROCEDURE — G8399 PT W/DXA RESULTS DOCUMENT: HCPCS | Performed by: INTERNAL MEDICINE

## 2020-10-14 PROCEDURE — 99214 OFFICE O/P EST MOD 30 MIN: CPT | Performed by: INTERNAL MEDICINE

## 2020-10-14 PROCEDURE — 1123F ACP DISCUSS/DSCN MKR DOCD: CPT | Performed by: INTERNAL MEDICINE

## 2020-10-14 PROCEDURE — 1090F PRES/ABSN URINE INCON ASSESS: CPT | Performed by: INTERNAL MEDICINE

## 2020-10-14 PROCEDURE — 3017F COLORECTAL CA SCREEN DOC REV: CPT | Performed by: INTERNAL MEDICINE

## 2020-10-14 RX ORDER — IRBESARTAN 150 MG/1
150 TABLET ORAL DAILY
Qty: 90 TABLET | Refills: 0 | Status: SHIPPED | OUTPATIENT
Start: 2020-10-14 | End: 2020-12-07

## 2020-10-14 RX ORDER — MULTIVIT-MIN/IRON/FOLIC ACID/K 18-600-40
1 CAPSULE ORAL DAILY
Qty: 90 CAPSULE | Refills: 0 | Status: SHIPPED | OUTPATIENT
Start: 2020-10-14 | End: 2020-10-16 | Stop reason: DRUGHIGH

## 2020-10-14 RX ORDER — TOLTERODINE 4 MG/1
4 CAPSULE, EXTENDED RELEASE ORAL DAILY
Qty: 30 CAPSULE | Refills: 3 | Status: SHIPPED | OUTPATIENT
Start: 2020-10-14

## 2020-10-14 NOTE — PROGRESS NOTES
SUBJECTIVE:  Bernard Park is a 78 y.o. female [address]   Chief Complaint   Patient presents with    Hypertension    Hyperlipidemia    Other        PT HERE FOR EVAL      HTN - TAKING MED . BP ELEVATED. + DIET / EXERCISE COMPLIANCE. NO HEADACHE , DIZZINESS No.  HLP - + DIET / EXERCISE COMPLIANCE. PREVIOUS LABS D/W PT  HEARTBURN -  IMPROVED. RARELY TAKING MED.  VIT D DEF - TAKING MED. LABS D/W PT  URI INCONTINENCE -  PERSISTENT.  STATES KEGEL'S EXERCISE NOT HELPING MUCH. ALLERGIC RHINITIS -  NO RHINORRHEA, OCC NASAL CONGESTION, NO POSTNASAL DRAINAGE , NO SINUS PRESSURE, NO HA, OCC SNEEZING, + WATERY ITCHY EYES.   CELLULITIS - SWELLING - LT LEG - RESOLVED     DENIES CP, No SOB, No PALPITATIONS, No COUGH, NO F/C  No ABD PAIN, No N/V, No DIARRHEA, No CONSTIPATION, No MELENA, No HEMATOCHEZIA. No DYSURIA, No FREQ, + URGENCY - OLD, No HEMATURIA            PMH: REVIEWED AND UPDATED TODAY    PSH: REVIEWED AND UPDATED TODAY    SOCIAL HX: REVIEWED AND UPDATED TODAY    FAMILY HX: REVIEWED AND UPDATED TODAY    ALLERGY:  Celebrex [celecoxib]; Sulfa antibiotics; and Codeine    MEDS: REVIEWED  Prior to Visit Medications    Medication Sig Taking?  Authorizing Provider   cephALEXin (KEFLEX) 500 MG capsule Take 1 capsule by mouth 4 times daily Yes Hilda Sheriff MD   irbesartan (AVAPRO) 150 MG tablet Take 1 tablet by mouth daily Yes Hilda Sheriff MD   Cholecalciferol (VITAMIN D) 50 MCG (2000 UT) CAPS capsule Take 1 capsule by mouth daily Yes Hilda Sheriff MD   diclofenac sodium 1 % GEL Apply 2 g topically 3 times daily as needed for Pain Yes Hilda Sheriff MD   famotidine (PEPCID) 20 MG tablet Take 1 tablet by mouth 2 times daily Yes Hilda Sheriff MD   sodium chloride (YULIYA 128) 5 % ophthalmic solution Place 1 drop into the left eye every 4 hours Yes Historical Provider, MD   calcium carbonate-vitamin D (CALTRATE 600+D) 600-400 MG-UNIT TABS per tab Take 1 tablet by mouth daily Yes Hilda Sheriff MD Carboxymethylcellul-Glycerin (REFRESH OPTIVE) 0.5-0.9 % SOLN Apply to eye Yes Historical Provider, MD   fluticasone (FLONASE) 50 MCG/ACT nasal spray 2 sprays by Nasal route daily as needed for Rhinitis Yes Lizzy Irving MD       ROS: COMPREHENSIVE ROS AS IN HX, REST -VE  History obtained from chart review and the patient    OBJECTIVE:   NURSING NOTE AND VITALS REVIEWED  BP (!) 150/90 (Site: Right Upper Arm, Position: Sitting, Cuff Size: Large Adult)   Pulse 66   Temp 97 °F (36.1 °C)   Ht 5' 10\" (1.778 m)   Wt 177 lb (80.3 kg)   SpO2 98%   Breastfeeding No   BMI 25.40 kg/m²     NO ACUTE DISTRESS    REPEAT BP:  136/80 (RT), NO ORTHOSTASIS     Body mass index is 25.4 kg/m². HEENT: NO PALLOR, ANICTERIC, PERRLA, EOMI, NO CONJUNCTIVAL ERYTHEMA,                 NO SINUS TENDERNESS  NECK:  SUPPLE, TRACHEA MIDLINE, NT, NO JVD, NO CB, NO LA, NO TM, NO STIFFNESS  CHEST: RESPY EFFORT NL, GOOD AE, NO W/R/C  HEART: S1S2+ REG, NO M/G/R  ABD: SOFT, NT, NO HSM, BS+  EXT: NO EDEMA, NT, PULSES +. LANDY'S -VE  NEURO: ALERT AND ORIENTED X 3, NO MENINGEAL SIGNS, NO TREMORS, NL GAIT, NO FOCAL DEFICITS  PSYCH: FAIRLY GOOD AFFECT  BACK: NT, NO ROM, NO CVA TENDERNESS    PREVIOUS LABS REVIEWED AND D/W PT       ASSESSMENT / PLAN:     Diagnosis Orders   1. Essential hypertension  COUNSELLED. REPEAT AS ABOVE. CONTINUE MED. ADVISED LOW NA+ / DASH DIET/ EXERCISE. MONITOR. GOAL </= 120/80  F/U LABS  MAKE CHANGES AS NEEDED. 2. Mixed hyperlipidemia  COUNSELLED. ADVISED LOW FAT / CHOL DIET/ EXERCISE.  MONITOR. F/U LABS  GOALS D/W PT.  MAKE CHANGES AS NEEDED. 3. Heartburn  COUNSELLED. CONTINUE MGT. ANTIREFLUX PRECAUTIONS ADVISED. MONITOR. MAKE CHANGES AS NEEDED. 4. Vitamin D deficiency  COUNSELLED. MONITOR ON VIT D SUPPLEMENT. MAKE CHANGES AS NEEDED. 5. Other urinary incontinence   COUNSELLED. ADVISED KEGEL'S EXERCISES  BLADDER TRAINING EXERCISES.   START ON DETROL LA 4 MG DAILY - S/E D/W PT. MONITOR  MAKE CHANGES AS NEEDED. 6. Other allergic rhinitis  COUNSELLED. MED PRN. MONITOR  MAKE CHANGES AS NEEDED.                  PT DECLINED VACCINATIONS - READDRESS          MEDICATION SIDE EFFECTS D/W PATIENT    PT STABLE AT TIME OF D/C.    RETURN TO CLINIC WITHIN 3 MONTHS / PRN    FOLLOW UP FOR FASTING LABS

## 2020-10-16 ENCOUNTER — TELEPHONE (OUTPATIENT)
Dept: INTERNAL MEDICINE CLINIC | Age: 70
End: 2020-10-16

## 2020-10-16 NOTE — TELEPHONE ENCOUNTER
CALLED AND DISCUSSED LABS WITH PT    VIT D HIGH NORMAL.  ADVISED DECREASE DOSE TO  1000 U     PT VERBALIZED UNDERSTANDING

## 2020-12-07 RX ORDER — IRBESARTAN 150 MG/1
150 TABLET ORAL DAILY
Qty: 90 TABLET | Refills: 0 | Status: SHIPPED | OUTPATIENT
Start: 2020-12-07 | End: 2021-01-19 | Stop reason: DRUGHIGH

## 2020-12-21 ENCOUNTER — OFFICE VISIT (OUTPATIENT)
Dept: PRIMARY CARE CLINIC | Age: 70
End: 2020-12-21
Payer: MEDICARE

## 2020-12-21 PROCEDURE — G8428 CUR MEDS NOT DOCUMENT: HCPCS | Performed by: NURSE PRACTITIONER

## 2020-12-21 PROCEDURE — G8417 CALC BMI ABV UP PARAM F/U: HCPCS | Performed by: NURSE PRACTITIONER

## 2020-12-21 PROCEDURE — 99211 OFF/OP EST MAY X REQ PHY/QHP: CPT | Performed by: NURSE PRACTITIONER

## 2020-12-21 NOTE — PROGRESS NOTES
Yoon Townsend received a viral test for COVID-19. They were educated on isolation and quarantine as appropriate. For any symptoms, they were directed to seek care from their PCP, given contact information to establish with a doctor, directed to an urgent care or the emergency room.

## 2020-12-22 LAB — SARS-COV-2, NAA: DETECTED

## 2021-01-04 ENCOUNTER — TELEPHONE (OUTPATIENT)
Dept: INTERNAL MEDICINE CLINIC | Age: 71
End: 2021-01-04

## 2021-01-04 NOTE — TELEPHONE ENCOUNTER
Patient has quarantined for 14 days and was told not to retake the Covid test because test will be positive. Wants to know the next steps.

## 2021-01-19 ENCOUNTER — VIRTUAL VISIT (OUTPATIENT)
Dept: INTERNAL MEDICINE CLINIC | Age: 71
End: 2021-01-19
Payer: MEDICARE

## 2021-01-19 DIAGNOSIS — R12 HEARTBURN: ICD-10-CM

## 2021-01-19 DIAGNOSIS — E55.9 VITAMIN D DEFICIENCY: ICD-10-CM

## 2021-01-19 DIAGNOSIS — Z86.16 HISTORY OF 2019 NOVEL CORONAVIRUS DISEASE (COVID-19): ICD-10-CM

## 2021-01-19 DIAGNOSIS — I10 HYPERTENSION, UNCONTROLLED: Primary | ICD-10-CM

## 2021-01-19 DIAGNOSIS — J30.89 OTHER ALLERGIC RHINITIS: ICD-10-CM

## 2021-01-19 DIAGNOSIS — E78.2 MIXED HYPERLIPIDEMIA: ICD-10-CM

## 2021-01-19 DIAGNOSIS — N39.498 OTHER URINARY INCONTINENCE: ICD-10-CM

## 2021-01-19 PROCEDURE — 1090F PRES/ABSN URINE INCON ASSESS: CPT | Performed by: INTERNAL MEDICINE

## 2021-01-19 PROCEDURE — 99214 OFFICE O/P EST MOD 30 MIN: CPT | Performed by: INTERNAL MEDICINE

## 2021-01-19 PROCEDURE — 3017F COLORECTAL CA SCREEN DOC REV: CPT | Performed by: INTERNAL MEDICINE

## 2021-01-19 PROCEDURE — 0509F URINE INCON PLAN DOCD: CPT | Performed by: INTERNAL MEDICINE

## 2021-01-19 PROCEDURE — 4040F PNEUMOC VAC/ADMIN/RCVD: CPT | Performed by: INTERNAL MEDICINE

## 2021-01-19 PROCEDURE — G8427 DOCREV CUR MEDS BY ELIG CLIN: HCPCS | Performed by: INTERNAL MEDICINE

## 2021-01-19 PROCEDURE — G8399 PT W/DXA RESULTS DOCUMENT: HCPCS | Performed by: INTERNAL MEDICINE

## 2021-01-19 PROCEDURE — 1123F ACP DISCUSS/DSCN MKR DOCD: CPT | Performed by: INTERNAL MEDICINE

## 2021-01-19 RX ORDER — IRBESARTAN 300 MG/1
300 TABLET ORAL DAILY
Qty: 90 TABLET | Refills: 0 | Status: SHIPPED | OUTPATIENT
Start: 2021-01-19 | End: 2021-04-14 | Stop reason: SDUPTHER

## 2021-01-19 RX ORDER — FAMOTIDINE 20 MG/1
20 TABLET, FILM COATED ORAL 2 TIMES DAILY
Qty: 60 TABLET | Refills: 1 | Status: SHIPPED | OUTPATIENT
Start: 2021-01-19 | End: 2021-08-09

## 2021-01-19 ASSESSMENT — PATIENT HEALTH QUESTIONNAIRE - PHQ9
SUM OF ALL RESPONSES TO PHQ QUESTIONS 1-9: 2
SUM OF ALL RESPONSES TO PHQ QUESTIONS 1-9: 2

## 2021-01-19 NOTE — PROGRESS NOTES
2021    TELEHEALTH EVALUATION -- Audio/Visual (During RIMJW-17 public health emergency)    PLACE OF SERVICE: PATIENT'S HOME    HPI: SEE BELOW    Tess Hayes (:  1950) has requested an audio/video evaluation for the following concern(s):      HTN - TAKING MED . STATES BP HAS BEEN ELEVATED. + DIET / EXERCISE COMPLIANCE . OCC HEADACHE , DIZZINESS No.  HEARTBURN -  WAXES AND WANES.  STATES TAKING MED. HLP - + DIET / EXERCISE COMPLIANCE. PREVIOUS LABS D/W PT  VIT D DEF - TAKING DECREASED MED DOSE. LABS D/W PT  URI INCONTINENCE -  PERSISTENT.  STATES KEGEL'S EXERCISE . STATES HAS NOT BEEN COMPLIANT WITH MED  ALLERGIC RHINITIS -  NO RHINORRHEA, OCC NASAL CONGESTION, NO POSTNASAL DRAINAGE , NO SINUS PRESSURE, OCC HA, OCC SNEEZING, + WATERY ITCHY EYES.   H/O + COVID I-  OCC COUGH - NON PRODUCTIVE - STATES IMPROVING, NO SOB, NO F/C       DENIES CP, No SOB, No PALPITATIONS  No ABD PAIN, No N/V, No DIARRHEA, OCC CONSTIPATION, No MELENA, No HEMATOCHEZIA. No DYSURIA, [place]      Prior to Visit Medications    Medication Sig Taking?  Authorizing Provider   irbesartan (AVAPRO) 150 MG tablet TAKE 1 TABLET BY MOUTH DAILY  Gary Toribio MD   diclofenac sodium (VOLTAREN) 1 % GEL APPLY 2 GRAMS EXTERNALLY TO THE AFFECTED AREA THREE TIMES DAILY AS NEEDED FOR PAIN  Gary Toribio MD   Cholecalciferol (VITAMIN D3) 25 MCG (1000 UT) CAPS Take 1 Can by mouth daily  Gary Toribio MD   tolterodine (DETROL LA) 4 MG extended release capsule Take 1 capsule by mouth daily  Gary Toribio MD   famotidine (PEPCID) 20 MG tablet Take 1 tablet by mouth 2 times daily  Gary Toribio MD   sodium chloride (YULIYA 128) 5 % ophthalmic solution Place 1 drop into the left eye every 4 hours  Historical Provider, MD   calcium carbonate-vitamin D (CALTRATE 600+D) 600-400 MG-UNIT TABS per tab Take 1 tablet by mouth daily  Gary Toribio MD Carboxymethylcellul-Glycerin (REFRESH OPTIVE) 0.5-0.9 % SOLN Apply to eye  Historical Provider, MD   fluticasone (FLONASE) 50 MCG/ACT nasal spray 2 sprays by Nasal route daily as needed for Rhinitis  Belia Tripp MD       Social History     Tobacco Use    Smoking status: Never Smoker    Smokeless tobacco: Never Used   Substance Use Topics    Alcohol use: No     Comment: QUIT > 20 YRS    Drug use: No      ROS: COMPREHENSIVE ROS AS IN HX, REST -VE  History obtained from chart review and the patient      PHYSICAL EXAMINATION:  [ INSTRUCTIONS:  \"[x]\" Indicates a positive item  \"[]\" Indicates a negative item  -- DELETE ALL ITEMS NOT EXAMINED]  Vital Signs: (As obtained by patient/caregiver or practitioner observation)    Blood pressure- 188/96 Heart rate-  78   Respiratory rate-    Temperature-  Pulse oximetry-     Constitutional: [x] Appears well-developed and well-nourished [x] No apparent distress      [] Abnormal-   Mental status  [x] Alert and awake  [x] Oriented to person/place/time [x]Able to follow commands      Eyes:  EOM    [x]  Normal  [] Abnormal-  Sclera  [x]  Normal  [] Abnormal -         Discharge [x]  None visible  [] Abnormal -    HENT:   [x] Normocephalic, atraumatic.   [] Abnormal   [x] Mouth/Throat: Mucous membranes are moist.     External Ears [x] Normal  [] Abnormal-     Neck: [x] No visualized mass     Pulmonary/Chest: [x] Respiratory effort normal.  [x] No visualized signs of difficulty breathing or respiratory distress        [] Abnormal-      Musculoskeletal:   [] Normal gait with no signs of ataxia         [x] Normal range of motion of neck        [] Abnormal-       Neurological:        [x] No Facial Asymmetry (Cranial nerve 7 motor function) (limited exam to video visit)          [x] No gaze palsy        [] Abnormal-          Skin:        [x] No significant exanthematous lesions or discoloration noted on facial skin         [] Abnormal- Psychiatric:       [] Normal Affect [x] No Hallucinations        [x] Abnormal-  OCC ANXIOUS AFFECT  Other pertinent observable physical exam findings-  NO SINUS TENDERNESS    PREVIOUS LABS  REVIEWED AND D/W PT       ASSESSMENT/PLAN:     Diagnosis Orders   1. Hypertension, uncontrolled  COUNSELLED. UNCONTROLLED  INCREASE AVAPRO  MG  DAILY  ADVISED STRESS MGT  ADVISED LOW NA+ / DASH DIET/ EXERCISE. MONITOR. GOAL </= 120/80  MAKE CHANGES AS NEEDED. 2. Heartburn  COUNSELLED. ADVISED PEPCID 20 MG BID  ANTIREFLUX PRECAUTIONS ADVISED. MONITOR. MAKE CHANGES AS NEEDED. 3. Mixed hyperlipidemia  COUNSELLED. ADVISED LOW FAT / CHOL DIET/ EXERCISE.  MONITOR. GOALS D/W PT.  MAKE CHANGES AS NEEDED. 4. Vitamin D deficiency  COUNSELLED. MONITOR ON VIT D SUPPLEMENT. MAKE CHANGES AS NEEDED. 5. Other urinary incontinence  COUNSELLED. ADVISED KEGEL'S EXERCISES  BLADDER TRAINING EXERCISES. ADVISED MED COMPLIANCE  MAKE CHANGES AS NEEDED. 6. Other allergic rhinitis  COUNSELLED. MED PRN. MONITOR  MAKE CHANGES AS NEEDED. 7. History of 2019 novel coronavirus disease (COVID-19)  COUNSELLED. ADVISED SAFETY PRECAUTIONS. ADVISED ON HEALTH DIET, EXERCISE.  MAINTAIN HYDRATION, REST  PT VERBALIZED UNDERSTANDING   MAKE CHANGES AS NEEDED              MEDICATION SIDE EFFECTS D/W PATIENT     PT STABLE AT TIME OF D/C.     RETURN TO CLINIC WITHIN  4 WEEKS / PRN Due to this being a TeleHealth encounter (During YFJTI-52 public health emergency), evaluation of the following organ systems was limited: Vitals/Constitutional/EENT/Resp/CV/GI//MS/Neuro/Skin/Heme-Lymph-Imm. Pursuant to the emergency declaration under the [address] [place] [address] and the [employer] and [employer] Act, this Virtual Visit was conducted with patient's (and/or legal guardian's) consent, to reduce the patient's risk of exposure to COVID-19 and provide necessary medical care. The patient (and/or legal guardian) has also been advised to contact this office for worsening conditions or problems, and seek emergency medical treatment and/or call 911 if deemed necessary. Patient identification was verified at the start of the visit: Yes      Services were provided through a video synchronous discussion virtually to substitute for in-person clinic visit. Patient and provider were located at their individual homes. --Isamar Mondragon MD on 1/19/2021 at 12:24 PM    An electronic signature was used to authenticate this note.

## 2021-01-25 ENCOUNTER — TELEPHONE (OUTPATIENT)
Dept: INTERNAL MEDICINE CLINIC | Age: 71
End: 2021-01-25

## 2021-01-25 NOTE — TELEPHONE ENCOUNTER
patient called and she indicated her blood pressure last night 200/100 and now it is 164/84.  Please advise

## 2021-01-25 NOTE — TELEPHONE ENCOUNTER
I spoke with the patient and she indicated her blood pressure last night 200/100 and now it is 164/84.  Please advise

## 2021-01-28 ENCOUNTER — TELEPHONE (OUTPATIENT)
Dept: INTERNAL MEDICINE CLINIC | Age: 71
End: 2021-01-28

## 2021-01-28 NOTE — TELEPHONE ENCOUNTER
Patient went to [facility] clinic yesterday due to bp being elevated and her bp in left arm 188/100 right arm 194/98 advise to go to emergency and patient refused so they prescribed a new med amlodipine besylate 5mg patient wanted physician to know

## 2021-01-28 NOTE — TELEPHONE ENCOUNTER
Patient went to [facility] clinic yesterday due to bp being elevated and her bp in left arm 188/100 right arm 194/98 advise to go to emergency and patient refused so they prescribed a new med amlodipine besylate 5mg patient wanted physician to know.

## 2021-01-29 NOTE — TELEPHONE ENCOUNTER
I called pt to check up on her got the v/m left message to call with another bp reading and for her to make an appt

## 2021-02-16 ENCOUNTER — VIRTUAL VISIT (OUTPATIENT)
Dept: INTERNAL MEDICINE CLINIC | Age: 71
End: 2021-02-16
Payer: MEDICARE

## 2021-02-16 DIAGNOSIS — M54.2 NECK PAIN: ICD-10-CM

## 2021-02-16 DIAGNOSIS — R12 HEARTBURN: ICD-10-CM

## 2021-02-16 DIAGNOSIS — J30.89 OTHER ALLERGIC RHINITIS: ICD-10-CM

## 2021-02-16 DIAGNOSIS — I10 ESSENTIAL HYPERTENSION: ICD-10-CM

## 2021-02-16 DIAGNOSIS — E55.9 VITAMIN D DEFICIENCY: ICD-10-CM

## 2021-02-16 DIAGNOSIS — F41.9 ANXIETY: Primary | ICD-10-CM

## 2021-02-16 DIAGNOSIS — E78.2 MIXED HYPERLIPIDEMIA: ICD-10-CM

## 2021-02-16 PROCEDURE — 99214 OFFICE O/P EST MOD 30 MIN: CPT | Performed by: INTERNAL MEDICINE

## 2021-02-16 PROCEDURE — 4040F PNEUMOC VAC/ADMIN/RCVD: CPT | Performed by: INTERNAL MEDICINE

## 2021-02-16 PROCEDURE — 3017F COLORECTAL CA SCREEN DOC REV: CPT | Performed by: INTERNAL MEDICINE

## 2021-02-16 PROCEDURE — G8427 DOCREV CUR MEDS BY ELIG CLIN: HCPCS | Performed by: INTERNAL MEDICINE

## 2021-02-16 PROCEDURE — 1123F ACP DISCUSS/DSCN MKR DOCD: CPT | Performed by: INTERNAL MEDICINE

## 2021-02-16 PROCEDURE — 1090F PRES/ABSN URINE INCON ASSESS: CPT | Performed by: INTERNAL MEDICINE

## 2021-02-16 PROCEDURE — G8399 PT W/DXA RESULTS DOCUMENT: HCPCS | Performed by: INTERNAL MEDICINE

## 2021-02-16 RX ORDER — AMLODIPINE BESYLATE 5 MG/1
5 TABLET ORAL DAILY
Qty: 90 TABLET | Refills: 0 | Status: SHIPPED | OUTPATIENT
Start: 2021-02-16 | End: 2021-04-14 | Stop reason: SDUPTHER

## 2021-02-16 RX ORDER — AMLODIPINE BESYLATE 5 MG/1
5 TABLET ORAL DAILY
COMMUNITY
End: 2021-02-16 | Stop reason: SDUPTHER

## 2021-02-16 RX ORDER — HYDROXYZINE HYDROCHLORIDE 25 MG/1
25 TABLET, FILM COATED ORAL EVERY 8 HOURS PRN
Qty: 30 TABLET | Refills: 1 | Status: SHIPPED | OUTPATIENT
Start: 2021-02-16 | End: 2021-04-14 | Stop reason: SDUPTHER

## 2021-02-16 NOTE — PROGRESS NOTES
2021    TELEHEALTH EVALUATION -- Audio/Visual (During AOCHM-13 public health emergency)    PLACE OF SERVICE: PATIENT'S HOME      HPI: SEE BELOW    Jeannine Tellez (:  1950) has requested an audio/video evaluation for the following concern(s):      C/O ANXIETY - PAST FEW WEEKS. H/O COVID, SPOUSE HOSPITALIZED. DENIES DEPRESSION. DENIES INSOMNIA. DENIES SUICIDAL / NO HOMICIDAL THOUGHTS / IDEATION   C/O NECK PAIN - RT. PAST 2 WEEKS. SHARP PAIN, NO RAD, PAIN SCALE 5/10, NO NUMBNESS / NO TINGLING. DENIES TRAUMA. HTN - TAKING AVAPRO, NORVASC 5MG ADDED AT [facility] SINCE LAST VISIT. + DIET / EXERCISE COMPLIANCE . OCC HEADACHE , DIZZINESS No.   HLP - + DIET / EXERCISE COMPLIANCE. PREVIOUS LABS D/W PT   HEARTBURN - WAXES AND WANES. STATES TAKING MED.   VIT D DEF - TAKING MED. LABS D/W PT   ALLERGIC RHINITIS - NO RHINORRHEA, OCC NASAL CONGESTION, NO POSTNASAL DRAINAGE , NO SINUS PRESSURE, OCC HA, OCC SNEEZING, + WATERY ITCHY EYES. DENIES CP, No SOB, No PALPITATIONS, NO COUGH, NO F/C   No ABD PAIN, No N/V, No DIARRHEA, CONSTIPATION I IMPROVED, No MELENA, No HEMATOCHEZIA. No DYSURIA, No FREQ, + URGENCY - OLD, No HEMATURIA      Allergies   Allergen Reactions    Celebrex [Celecoxib] Other (See Comments)     Headache    Sulfa Antibiotics     Codeine      TYLENIOL # 3           Prior to Visit Medications    Medication Sig Taking?  Authorizing Provider   amLODIPine (NORVASC) 5 MG tablet Take 1 tablet by mouth daily Yes    diclofenac sodium (VOLTAREN) 1 % GEL APPLY 2 GRAMS EXTERNALLY TO THE AFFECTED AREA THREE TIMES DAILY AS NEEDED FOR PAIN Yes Gladys Fabian MD   irbesartan (AVAPRO) 300 MG tablet Take 1 tablet by mouth daily Yes Gladys Fabian MD   famotidine (PEPCID) 20 MG tablet Take 1 tablet by mouth 2 times daily Yes Gladys Fabian MD   Cholecalciferol (VITAMIN D3) 25 MCG (1000 UT) CAPS Take 1 Can by mouth daily Yes Gladys Fabian MD tolterodine (DETROL LA) 4 MG extended release capsule Take 1 capsule by mouth daily Yes Sara Mcleod MD   sodium chloride (YULIYA 128) 5 % ophthalmic solution Place 1 drop into the left eye every 4 hours Yes Historical Provider, MD   calcium carbonate-vitamin D (CALTRATE 600+D) 600-400 MG-UNIT TABS per tab Take 1 tablet by mouth daily Yes Sara Mcleod MD   Carboxymethylcellul-Glycerin (REFRESH OPTIVE) 0.5-0.9 % SOLN Apply to eye Yes Historical Provider, MD   fluticasone (FLONASE) 50 MCG/ACT nasal spray 2 sprays by Nasal route daily as needed for Rhinitis Yes Sara Mcleod MD       Social History     Tobacco Use    Smoking status: Never Smoker    Smokeless tobacco: Never Used   Substance Use Topics    Alcohol use: No     Comment: QUIT > 20 YRS    Drug use: No      ROS: COMPREHENSIVE ROS AS IN HX, REST -VE  History obtained from chart review and the patient    PHYSICAL EXAMINATION:  [ INSTRUCTIONS:  \"[x]\" Indicates a positive item  \"[]\" Indicates a negative item  -- DELETE ALL ITEMS NOT EXAMINED]  Vital Signs: (As obtained by patient/caregiver or practitioner observation)    Blood pressure- 134/68 Heart rate- 81    Respiratory rate-    Temperature-  Pulse oximetry-     Constitutional: [x] Appears well-developed and well-nourished [] No apparent distress      [x] Abnormal- OCC PAIN DISTRESS    Mental status  [x] Alert and awake  [x] Oriented to person/place/time [x]Able to follow commands      Eyes:  EOM    [x]  Normal  [] Abnormal-  Sclera  [x]  Normal  [] Abnormal -         Discharge [x]  None visible  [] Abnormal -    HENT:   [x] Normocephalic, atraumatic.   [] Abnormal   [x] Mouth/Throat: Mucous membranes are moist.     External Ears [x] Normal  [] Abnormal-     Neck: [x] No visualized mass     Pulmonary/Chest: [x] Respiratory effort normal.  [x] No visualized signs of difficulty breathing or respiratory distress        [] Abnormal-      Musculoskeletal:   [] Normal gait with no signs of ataxia [x] Normal range of motion of neck        [x] Abnormal- + TENDERNESS RT LATERAL NECK, + PAIN WITH MVT RT, NO ROM    Neurological:        [x] No Facial Asymmetry (Cranial nerve 7 motor function) (limited exam to video visit)          [x] No gaze palsy        [] Abnormal-         Skin:        [x] No significant exanthematous lesions or discoloration noted on facial skin         [] Abnormal-            Psychiatric:       [] Normal Affect [x] No Hallucinations        [x] Abnormal- + ANXIOUS AFFECT    Other pertinent observable physical exam findings- NO SINUS TENDERNESS    PREVIOUS LABS REVIEWED AND D/W PT    ASSESSMENT/PLAN:       Diagnosis Orders   1. Anxiety  COUNSELLED. START ON HYDROXYZINE PRN - S/E D/W PT  PT COUNSELLED  ON RELAXATION TECHNIQUES. ADDRESSED STRESS MGT. MONITOR  PT NOT SUICIDAL/ NOT HOMICIDAL  MAKE CHANGES AS NEEDED. 2. Neck pain  COUNSELLED ? OA. EXERCISES. ANALGESICS PRN. MONITOR. ADVISED diclofenac sodium (VOLTAREN) 1 % GEL PRN. MONITOR. MAKE CHANGES AS NEEDED. 3. Essential hypertension  COUNSELLED. IMPROVING. CONTINUE NORVASC 5 MG AND AVAPRO  ADVISED LOW NA+ / DASH DIET/ EXERCISE. MONITOR. GOAL </= 120/80  F/U LAB. MONITOR  MAKE CHANGES AS NEEDED. 4. Mixed hyperlipidemia  COUNSELLED. ADVISED LOW FAT / CHOL DIET/ EXERCISE.  MONITOR. GOALS D/W PT.  MAKE CHANGES AS NEEDED. 5. Heartburn  COUNSELLED. CONTINUE MGT. ANTIREFLUX PRECAUTIONS ADVISED. MONITOR. MAKE CHANGES AS NEEDED. 6. Vitamin D deficiency  COUNSELLED. MONITOR ON VIT D SUPPLEMENT. MAKE CHANGES AS NEEDED. 7. Other allergic rhinitis  COUNSELLED. MED PRN. MONITOR  MAKE CHANGES AS NEEDED.            MEDICATION SIDE EFFECTS D/W PATIENT     RETURN TO CLINIC WITHIN  2 MONTHS / PRN     FOLLOW UP FOR LABS Due to this being a TeleHealth encounter (During VRHBW-01 public health emergency), evaluation of the following organ systems was limited: Vitals/Constitutional/EENT/Resp/CV/GI//MS/Neuro/Skin/Heme-Lymph-Imm. Pursuant to the emergency declaration under the [address] [place] [address] and the [employer] Act, this Virtual Visit was conducted with patient's (and/or legal guardian's) consent, to reduce the patient's risk of exposure to COVID-19 and provide necessary medical care. The patient (and/or legal guardian) has also been advised to contact this office for worsening conditions or problems, and seek emergency medical treatment and/or call 911 if deemed necessary. Patient identification was verified at the start of the visit: Yes    Services were provided through a video synchronous discussion virtually to substitute for in-person clinic visit. Patient and provider were located at their individual homes. --Isamar Mondragon MD on 2/16/2021 at 1:42 PM    An electronic signature was used to authenticate this note.

## 2021-04-14 ENCOUNTER — OFFICE VISIT (OUTPATIENT)
Dept: INTERNAL MEDICINE CLINIC | Age: 71
End: 2021-04-14
Payer: MEDICARE

## 2021-04-14 VITALS
SYSTOLIC BLOOD PRESSURE: 150 MMHG | BODY MASS INDEX: 25.48 KG/M2 | DIASTOLIC BLOOD PRESSURE: 80 MMHG | HEART RATE: 66 BPM | OXYGEN SATURATION: 94 % | WEIGHT: 178 LBS | HEIGHT: 70 IN

## 2021-04-14 DIAGNOSIS — E78.2 MIXED HYPERLIPIDEMIA: ICD-10-CM

## 2021-04-14 DIAGNOSIS — R12 HEARTBURN: ICD-10-CM

## 2021-04-14 DIAGNOSIS — R22.31 LUMP OF SKIN OF UPPER EXTREMITY, RIGHT: ICD-10-CM

## 2021-04-14 DIAGNOSIS — R60.0 LOCALIZED EDEMA: ICD-10-CM

## 2021-04-14 DIAGNOSIS — F41.9 ANXIETY: ICD-10-CM

## 2021-04-14 DIAGNOSIS — E55.9 VITAMIN D DEFICIENCY: ICD-10-CM

## 2021-04-14 DIAGNOSIS — J30.89 OTHER ALLERGIC RHINITIS: ICD-10-CM

## 2021-04-14 DIAGNOSIS — I10 ESSENTIAL HYPERTENSION: Primary | ICD-10-CM

## 2021-04-14 DIAGNOSIS — Z86.16 HISTORY OF COVID-19: ICD-10-CM

## 2021-04-14 DIAGNOSIS — I10 ESSENTIAL HYPERTENSION: ICD-10-CM

## 2021-04-14 PROCEDURE — 4040F PNEUMOC VAC/ADMIN/RCVD: CPT | Performed by: INTERNAL MEDICINE

## 2021-04-14 PROCEDURE — G8399 PT W/DXA RESULTS DOCUMENT: HCPCS | Performed by: INTERNAL MEDICINE

## 2021-04-14 PROCEDURE — 3017F COLORECTAL CA SCREEN DOC REV: CPT | Performed by: INTERNAL MEDICINE

## 2021-04-14 PROCEDURE — 1123F ACP DISCUSS/DSCN MKR DOCD: CPT | Performed by: INTERNAL MEDICINE

## 2021-04-14 PROCEDURE — 1090F PRES/ABSN URINE INCON ASSESS: CPT | Performed by: INTERNAL MEDICINE

## 2021-04-14 PROCEDURE — 1036F TOBACCO NON-USER: CPT | Performed by: INTERNAL MEDICINE

## 2021-04-14 PROCEDURE — G8417 CALC BMI ABV UP PARAM F/U: HCPCS | Performed by: INTERNAL MEDICINE

## 2021-04-14 PROCEDURE — G8427 DOCREV CUR MEDS BY ELIG CLIN: HCPCS | Performed by: INTERNAL MEDICINE

## 2021-04-14 PROCEDURE — 99214 OFFICE O/P EST MOD 30 MIN: CPT | Performed by: INTERNAL MEDICINE

## 2021-04-14 RX ORDER — IRBESARTAN 300 MG/1
300 TABLET ORAL DAILY
Qty: 90 TABLET | Refills: 0 | Status: SHIPPED | OUTPATIENT
Start: 2021-04-14 | End: 2021-06-14 | Stop reason: SDUPTHER

## 2021-04-14 RX ORDER — HYDROXYZINE HYDROCHLORIDE 25 MG/1
25 TABLET, FILM COATED ORAL EVERY 8 HOURS PRN
Qty: 30 TABLET | Refills: 1 | Status: SHIPPED | OUTPATIENT
Start: 2021-04-14 | End: 2021-06-14 | Stop reason: SDUPTHER

## 2021-04-14 RX ORDER — AMLODIPINE BESYLATE 5 MG/1
5 TABLET ORAL DAILY
Qty: 90 TABLET | Refills: 0 | Status: SHIPPED | OUTPATIENT
Start: 2021-04-14 | End: 2021-06-14 | Stop reason: SDUPTHER

## 2021-04-14 NOTE — PROGRESS NOTES
SUBJECTIVE:  Maria Luisa Mcdowell is a 78 y.o. female [address] Complaint   Patient presents with    Hypertension    Hyperlipidemia    Other        PT HERE FOR EVAL       HTN - TAKING AVAPRO AND NORVASC 5MG. SBP ELEVATED. STATES BP OK AT HOME. + DIET / EXERCISE COMPLIANCE . NO HEADACHE , DIZZINESS No.   ANXIETY - TAKING MED OCCASIONALLY - HELPING SOME. ? DEPRESSION. OCC INSOMNIA. DENIES SUICIDAL / NO HOMICIDAL THOUGHTS / IDEATION   HLP - + DIET / EXERCISE COMPLIANCE. PREVIOUS LABS D/W PT   C/O LUMP RT FOREARM - NOTED 2 DAYS AGO. DENIES PAIN. ? NO TRAUMA  HEARTBURN - WAXES AND WANES. STATES TAKING MED.   VIT D DEF - TAKING MED. LABS D/W PT   ALLERGIC RHINITIS - NO RHINORRHEA, OCC NASAL CONGESTION, NO POSTNASAL DRAINAGE , NO SINUS PRESSURE, NO HA, OCC SNEEZING, + WATERY ITCHY EYES.   LEG SWELLING - OLD PER PT. STATES HAD JOBST STOCKING. REQUESTING SCRIPT  H/O COVID - DENIES SYMPTOMS. COVID VACCINE D/W PT  NECK PAIN - RT. RESOLVED      DENIES CP, No SOB, No PALPITATIONS, NO COUGH, NO F/C   No ABD PAIN, No N/V, No DIARRHEA, CONSTIPATION  IMPROVED, No MELENA, No HEMATOCHEZIA. No DYSURIA, No FREQ, + URGENCY - OLD, No HEMATURIA          PMH: REVIEWED AND UPDATED TODAY    PSH: REVIEWED AND UPDATED TODAY    SOCIAL HX: REVIEWED AND UPDATED TODAY    FAMILY HX: REVIEWED AND UPDATED TODAY    ALLERGY:  Celebrex [celecoxib], Sulfa antibiotics, and Codeine    MEDS: REVIEWED  Prior to Visit Medications    Medication Sig Taking?  Authorizing Provider   amLODIPine (NORVASC) 5 MG tablet Take 1 tablet by mouth daily Yes Trena Martines MD   diclofenac sodium (VOLTAREN) 1 % GEL APPLY 2 GRAMS EXTERNALLY TO THE AFFECTED AREA THREE TIMES DAILY AS NEEDED FOR PAIN Yes Trena Martines MD   hydrOXYzine (ATARAX) 25 MG tablet Take 1 tablet by mouth every 8 hours as needed for Anxiety Yes Trena Martines MD   irbesartan (AVAPRO) 300 MG tablet Take 1 tablet by mouth daily Yes Trena Martines MD   famotidine (PEPCID) 20 MG tablet Take 1 tablet by mouth 2 times daily Yes Trena Martines MD   Cholecalciferol (VITAMIN D3) 25 MCG (1000 UT) CAPS Take 1 Can by mouth daily Yes Trena Martines MD   tolterodine (DETROL LA) 4 MG extended release capsule Take 1 capsule by mouth daily Yes Trena Martines MD   sodium chloride (YULIYA 128) 5 % ophthalmic solution Place 1 drop into the left eye every 4 hours Yes Historical Provider, MD   calcium carbonate-vitamin D (CALTRATE 600+D) 600-400 MG-UNIT TABS per tab Take 1 tablet by mouth daily Yes Trena Martines MD   Carboxymethylcellul-Glycerin (REFRESH OPTIVE) 0.5-0.9 % SOLN Apply to eye Yes Historical Provider, MD   fluticasone (FLONASE) 50 MCG/ACT nasal spray 2 sprays by Nasal route daily as needed for Rhinitis Yes Trena Martines MD       ROS: COMPREHENSIVE ROS AS IN HX, REST -VE  History obtained from chart review and the patient    OBJECTIVE:   NURSING NOTE AND VITALS REVIEWED  BP (!) 150/70 (Site: Left Upper Arm, Position: Sitting, Cuff Size: Large Adult)   Pulse 66   Ht 5' 10\" (1.778 m)   Wt 178 lb (80.7 kg)   SpO2 94%   Breastfeeding No   BMI 25.54 kg/m²     NO ACUTE DISTRESS    REPEAT BP: 150/80 (LT), NO ORTHOSTASIS     Body mass index is 25.54 kg/m². HEENT: NO PALLOR, ANICTERIC, PERRLA, EOMI, NO CONJUNCTIVAL ERYTHEMA,                 NO SINUS TENDERNESS  NECK:  SUPPLE, TRACHEA MIDLINE, NT, NO JVD, NO CB, NO LA, NO TM, NO STIFFNESS  CHEST: RESPY EFFORT NL, GOOD AE, NO W/R/C  HEART: S1S2+ REG, NO M/G/R  ABD: SOFT, NT, NO HSM, BS+  EXT: TRACE EDEMA, NT, PULSES +. LANDY'S -VE  NEURO: ALERT AND ORIENTED X 3, NO MENINGEAL SIGNS, NO TREMORS, NL GAIT, NO FOCAL DEFICITS  PSYCH: FAIRLY GOOD AFFECT  BACK: NT, NO ROM, NO CVA TENDERNESS  FOREARM : SMALL LUMP POST RT, NT, + MOBILE, NO ERYTHEMA, NO WARMTH      PREVIOUS LABS REVIEWED AND D/W PT  / LAST LABS PENDING      ASSESSMENT / PLAN:     Diagnosis Orders   1. Essential hypertension  COUNSELLED.  REPEAT BP AS ABOVE - NOT AT GOAL  PT DEFERRED MED CHANGE - ADVISED MED COMPLIANCE - CONTINUE MEDS  ADVISED LOW NA+ / DASH DIET/ EXERCISE. MONITOR. GOAL </= 120/80  F/U LABS  MAKE CHANGES AS NEEDED. 2. Anxiety  COUNSELLED. hydrOXYzine (ATARAX) 25 MG PRN  PT COUNSELLED  ON RELAXATION TECHNIQUES. ADDRESSED STRESS MGT. MONITOR  PT NOT SUICIDAL/ NOT HOMICIDAL  MAKE CHANGES AS NEEDED. 3. Mixed hyperlipidemia  COUNSELLED. ADVISED LOW FAT / CHOL DIET/ EXERCISE.  MONITOR. GOALS D/W PT.  MAKE CHANGES AS NEEDED. 4. Lump  of upper extremity, right (FOREARM)  COUNSELLED. ? CYST. MONITOR FOR CHANGE IN SIZE / SYMPTOMS  DEFERRED SURGERY EVAL. MAKE CHANGES AS NEEDED. 5. Heartburn  COUNSELLED. CONTINUE MGT. ANTIREFLUX PRECAUTIONS ADVISED. MONITOR. MAKE CHANGES AS NEEDED. 6. Vitamin D deficiency  COUNSELLED. MONITOR ON VIT D SUPPLEMENT. MAKE CHANGES AS NEEDED. 7. Other allergic rhinitis  COUNSELLED. MED PRN. MONITOR  MAKE CHANGES AS NEEDED. 8. Localized edema  COUNSELLED. ADVISED LOW NA+ DIET. ELEVATE LEGS. COMPRESSION STOCKINGS. JOBST RELIEF 30-40MMHG MEDIUM) MISC ORDERED  MAKE CHANGES AS NEEDED. 9. History of COVID-19  COUNSELLED. ADVISED SAFETY PRECAUTIONS.   ADVISED COVID VACCINE - PT STATES WILL CONSIDER - READDRESS   MAKE CHANGES AS NEEDED                         MEDICATION SIDE EFFECTS D/W PATIENT    RETURN TO CLINIC WITHIN 2 MONTHS / PRN    FOLLOW UP FOR LABS

## 2021-04-15 LAB
A/G RATIO: 2 (ref 1.1–2.2)
ALBUMIN SERPL-MCNC: 4.6 G/DL (ref 3.4–5)
ALP BLD-CCNC: 98 U/L (ref 40–129)
ALT SERPL-CCNC: 25 U/L (ref 10–40)
ANION GAP SERPL CALCULATED.3IONS-SCNC: 8 MMOL/L (ref 3–16)
AST SERPL-CCNC: 23 U/L (ref 15–37)
BILIRUB SERPL-MCNC: 0.4 MG/DL (ref 0–1)
BUN BLDV-MCNC: 12 MG/DL (ref 7–20)
CALCIUM SERPL-MCNC: 10.2 MG/DL (ref 8.3–10.6)
CHLORIDE BLD-SCNC: 105 MMOL/L (ref 99–110)
CO2: 32 MMOL/L (ref 21–32)
CREAT SERPL-MCNC: 0.6 MG/DL (ref 0.6–1.2)
GFR AFRICAN AMERICAN: >60
GFR NON-AFRICAN AMERICAN: >60
GLOBULIN: 2.3 G/DL
GLUCOSE BLD-MCNC: 97 MG/DL (ref 70–99)
POTASSIUM SERPL-SCNC: 4.1 MMOL/L (ref 3.5–5.1)
SODIUM BLD-SCNC: 145 MMOL/L (ref 136–145)
TOTAL PROTEIN: 6.9 G/DL (ref 6.4–8.2)
VITAMIN D 25-HYDROXY: 69.7 NG/ML

## 2021-05-10 DIAGNOSIS — J30.89 OTHER ALLERGIC RHINITIS: ICD-10-CM

## 2021-05-13 RX ORDER — LORATADINE 10 MG/1
TABLET ORAL
Qty: 30 TABLET | Refills: 3 | Status: SHIPPED | OUTPATIENT
Start: 2021-05-13 | End: 2021-09-14 | Stop reason: SDUPTHER

## 2021-06-11 NOTE — PROGRESS NOTES
RELIEF 30-40MMHG MEDIUM) MISC 1 Device by Does not apply route daily Yes Rosie Liu MD   diclofenac sodium (VOLTAREN) 1 % GEL APPLY 2 GRAMS EXTERNALLY TO THE AFFECTED AREA THREE TIMES DAILY AS NEEDED FOR PAIN Yes Rosie Liu MD   famotidine (PEPCID) 20 MG tablet Take 1 tablet by mouth 2 times daily Yes Rosie Liu MD   tolterodine (DETROL LA) 4 MG extended release capsule Take 1 capsule by mouth daily Yes Rosie Liu MD   sodium chloride (YULIYA 128) 5 % ophthalmic solution Place 1 drop into the left eye every 4 hours Yes Historical Provider, MD   calcium carbonate-vitamin D (CALTRATE 600+D) 600-400 MG-UNIT TABS per tab Take 1 tablet by mouth daily Yes Rosie Liu MD   Carboxymethylcellul-Glycerin (REFRESH OPTIVE) 0.5-0.9 % SOLN Apply to eye Yes Historical Provider, MD   fluticasone (FLONASE) 50 MCG/ACT nasal spray 2 sprays by Nasal route daily as needed for Rhinitis Yes Rosie Liu MD       ROS: COMPREHENSIVE ROS AS IN HX, REST -VE  History obtained from chart review and the patient      OBJECTIVE:   NURSING NOTE AND VITALS REVIEWED  /78 (Site: Left Upper Arm, Position: Sitting, Cuff Size: Large Adult)   Pulse 87   Ht 5' 10\" (1.778 m)   Wt 178 lb 9.6 oz (81 kg)   SpO2 98%   Breastfeeding No   BMI 25.63 kg/m²     NO ACUTE DISTRESS    REPEAT BP: 130/80 (LT), NO ORTHOSTASIS     Body mass index is 25.63 kg/m². HEENT: NO PALLOR, ANICTERIC, PERRLA, EOMI, NO CONJUNCTIVAL ERYTHEMA,                 NO SINUS TENDERNESS  NECK:  SUPPLE, TRACHEA MIDLINE, NT, NO JVD, NO CB, NO LA, NO TM, NO STIFFNESS  CHEST: RESPY EFFORT NL, GOOD AE, NO W/R/C  HEART: S1S2+ REG, NO M/G/R  ABD: SOFT, NT, NO HSM, BS+  EXT: TRACE EDEMA, NT, PULSES +.  LANDY'S -VE  NEURO: ALERT AND ORIENTED X 3, NO MENINGEAL SIGNS, NO TREMORS, NL GAIT, NO FOCAL DEFICITS  PSYCH: FAIRLY GOOD AFFECT  BACK: NT, NO ROM, NO CVA TENDERNESS  SHOULDER: ? NT, + PAIN WITH MVT, ROM - LT      PREVIOUS LABS REVIEWED AND D/W PT ASSESSMENT / PLAN:     Diagnosis Orders   1. Anxiety  COUNSELLED. ATARAX PRN  PT COUNSELLED  ON RELAXATION TECHNIQUES. ADDRESSED STRESS MGT. MONITOR  PT NOT SUICIDAL/ NOT HOMICIDAL  MAKE CHANGES AS NEEDED. 2. Essential hypertension  COUNSELLED. REPEAT BP AS ABOVE  CONTINUE MEDS. ADVISED LOW NA+ / DASH DIET/ EXERCISE. MONITOR. GOAL </= 120/80  MAKE CHANGES AS NEEDED. 3. Mixed hyperlipidemia  COUNSELLED. ADVISED LOW FAT / CHOL DIET/ EXERCISE.  MONITOR. GOALS D/W PT.  MAKE CHANGES AS NEEDED. 4. Heartburn  COUNSELLED. CONTINUE MGT. ANTIREFLUX PRECAUTIONS ADVISED. MONITOR. MAKE CHANGES AS NEEDED. 5. Vitamin D deficiency  COUNSELLED. MONITOR ON VIT D SUPPLEMENT. MAKE CHANGES AS NEEDED. 6. Other allergic rhinitis  COUNSELLED. MED PRN. MONITOR  MAKE CHANGES AS NEEDED. 7. Acute pain of left shoulder  COUNSELLED. START ON NAPROSYN BID / PRN - WITH FOOD  ? OA. EXERCISES. ANALGESICS PRN. MONITOR. X RAY TO EVAL  ADVISED ON HOME EXERCISES  MAKE CHANGES AS NEEDED. 8. History of 2019 novel coronavirus disease (COVID-19)  COUNSELLED. ADVISED COVID VACCINE  MAKE CHANGES AS NEEDED.                          MEDICATION SIDE EFFECTS D/W PATIENT      RETURN TO CLINIC WITHIN 3 MONTHS / PRN    FOLLOW UP FOR X RAY

## 2021-06-14 ENCOUNTER — HOSPITAL ENCOUNTER (OUTPATIENT)
Age: 71
Discharge: HOME | End: 2021-06-14
Payer: MEDICARE

## 2021-06-14 ENCOUNTER — OFFICE VISIT (OUTPATIENT)
Dept: INTERNAL MEDICINE CLINIC | Age: 71
End: 2021-06-14
Payer: MEDICARE

## 2021-06-14 ENCOUNTER — HOSPITAL ENCOUNTER (OUTPATIENT)
Dept: GENERAL RADIOLOGY | Age: 71
Discharge: HOME | End: 2021-06-14
Payer: MEDICARE

## 2021-06-14 VITALS
OXYGEN SATURATION: 98 % | BODY MASS INDEX: 25.57 KG/M2 | SYSTOLIC BLOOD PRESSURE: 130 MMHG | HEIGHT: 70 IN | HEART RATE: 87 BPM | WEIGHT: 178.6 LBS | DIASTOLIC BLOOD PRESSURE: 80 MMHG

## 2021-06-14 DIAGNOSIS — E78.2 MIXED HYPERLIPIDEMIA: ICD-10-CM

## 2021-06-14 DIAGNOSIS — M25.512 ACUTE PAIN OF LEFT SHOULDER: ICD-10-CM

## 2021-06-14 DIAGNOSIS — Z86.16 HISTORY OF 2019 NOVEL CORONAVIRUS DISEASE (COVID-19): ICD-10-CM

## 2021-06-14 DIAGNOSIS — I10 ESSENTIAL HYPERTENSION: ICD-10-CM

## 2021-06-14 DIAGNOSIS — E55.9 VITAMIN D DEFICIENCY: ICD-10-CM

## 2021-06-14 DIAGNOSIS — F41.9 ANXIETY: Primary | ICD-10-CM

## 2021-06-14 DIAGNOSIS — R12 HEARTBURN: ICD-10-CM

## 2021-06-14 DIAGNOSIS — J30.89 OTHER ALLERGIC RHINITIS: ICD-10-CM

## 2021-06-14 PROCEDURE — 73030 X-RAY EXAM OF SHOULDER: CPT

## 2021-06-14 PROCEDURE — 1090F PRES/ABSN URINE INCON ASSESS: CPT | Performed by: INTERNAL MEDICINE

## 2021-06-14 PROCEDURE — 1036F TOBACCO NON-USER: CPT | Performed by: INTERNAL MEDICINE

## 2021-06-14 PROCEDURE — G8399 PT W/DXA RESULTS DOCUMENT: HCPCS | Performed by: INTERNAL MEDICINE

## 2021-06-14 PROCEDURE — 3017F COLORECTAL CA SCREEN DOC REV: CPT | Performed by: INTERNAL MEDICINE

## 2021-06-14 PROCEDURE — 1123F ACP DISCUSS/DSCN MKR DOCD: CPT | Performed by: INTERNAL MEDICINE

## 2021-06-14 PROCEDURE — 99214 OFFICE O/P EST MOD 30 MIN: CPT | Performed by: INTERNAL MEDICINE

## 2021-06-14 PROCEDURE — G8417 CALC BMI ABV UP PARAM F/U: HCPCS | Performed by: INTERNAL MEDICINE

## 2021-06-14 PROCEDURE — G8427 DOCREV CUR MEDS BY ELIG CLIN: HCPCS | Performed by: INTERNAL MEDICINE

## 2021-06-14 PROCEDURE — 4040F PNEUMOC VAC/ADMIN/RCVD: CPT | Performed by: INTERNAL MEDICINE

## 2021-06-14 RX ORDER — HYDROXYZINE HYDROCHLORIDE 25 MG/1
25 TABLET, FILM COATED ORAL EVERY 8 HOURS PRN
Qty: 30 TABLET | Refills: 1 | Status: SHIPPED | OUTPATIENT
Start: 2021-06-14 | End: 2021-09-14

## 2021-06-14 RX ORDER — FLUOROMETHOLONE 0.1 %
1 SUSPENSION, DROPS(FINAL DOSAGE FORM)(ML) OPHTHALMIC (EYE) EVERY 4 HOURS
COMMUNITY

## 2021-06-14 RX ORDER — NAPROXEN 500 MG/1
500 TABLET ORAL 2 TIMES DAILY PRN
Qty: 60 TABLET | Refills: 0 | Status: SHIPPED | OUTPATIENT
Start: 2021-06-14 | End: 2021-12-30 | Stop reason: SDUPTHER

## 2021-06-14 RX ORDER — IRBESARTAN 300 MG/1
300 TABLET ORAL DAILY
Qty: 90 TABLET | Refills: 0 | Status: SHIPPED | OUTPATIENT
Start: 2021-06-14 | End: 2021-09-14 | Stop reason: SDUPTHER

## 2021-06-14 RX ORDER — AMLODIPINE BESYLATE 5 MG/1
5 TABLET ORAL DAILY
Qty: 90 TABLET | Refills: 0 | Status: SHIPPED | OUTPATIENT
Start: 2021-06-14 | End: 2021-09-14 | Stop reason: SDUPTHER

## 2021-06-14 SDOH — ECONOMIC STABILITY: FOOD INSECURITY: WITHIN THE PAST 12 MONTHS, YOU WORRIED THAT YOUR FOOD WOULD RUN OUT BEFORE YOU GOT MONEY TO BUY MORE.: NEVER TRUE

## 2021-06-14 SDOH — ECONOMIC STABILITY: FOOD INSECURITY: WITHIN THE PAST 12 MONTHS, THE FOOD YOU BOUGHT JUST DIDN'T LAST AND YOU DIDN'T HAVE MONEY TO GET MORE.: NEVER TRUE

## 2021-06-14 ASSESSMENT — SOCIAL DETERMINANTS OF HEALTH (SDOH): HOW HARD IS IT FOR YOU TO PAY FOR THE VERY BASICS LIKE FOOD, HOUSING, MEDICAL CARE, AND HEATING?: NOT HARD AT ALL

## 2021-06-14 ASSESSMENT — PATIENT HEALTH QUESTIONNAIRE - PHQ9
SUM OF ALL RESPONSES TO PHQ QUESTIONS 1-9: 0
2. FEELING DOWN, DEPRESSED OR HOPELESS: 0
SUM OF ALL RESPONSES TO PHQ9 QUESTIONS 1 & 2: 0
SUM OF ALL RESPONSES TO PHQ QUESTIONS 1-9: 0
SUM OF ALL RESPONSES TO PHQ QUESTIONS 1-9: 0
1. LITTLE INTEREST OR PLEASURE IN DOING THINGS: 0

## 2021-06-14 NOTE — PATIENT INSTRUCTIONS
TAKE MED AS ADVISED    DIET/ EXERCISE.     FOLLOW UP WITHIN 3 MONTHS / AS NEEDED    FOLLOW UP FOR X RAY

## 2021-08-08 DIAGNOSIS — R12 HEARTBURN: ICD-10-CM

## 2021-08-09 RX ORDER — FAMOTIDINE 20 MG/1
TABLET, FILM COATED ORAL
Qty: 60 TABLET | Refills: 1 | Status: SHIPPED | OUTPATIENT
Start: 2021-08-09 | End: 2021-12-30 | Stop reason: SDUPTHER

## 2021-09-14 ENCOUNTER — VIRTUAL VISIT (OUTPATIENT)
Dept: INTERNAL MEDICINE CLINIC | Age: 71
End: 2021-09-14
Payer: MEDICARE

## 2021-09-14 DIAGNOSIS — M54.2 NECK PAIN ON RIGHT SIDE: ICD-10-CM

## 2021-09-14 DIAGNOSIS — G44.89 OTHER HEADACHE SYNDROME: ICD-10-CM

## 2021-09-14 DIAGNOSIS — E55.9 VITAMIN D DEFICIENCY: ICD-10-CM

## 2021-09-14 DIAGNOSIS — I10 ESSENTIAL HYPERTENSION: Primary | ICD-10-CM

## 2021-09-14 DIAGNOSIS — M25.512 ACUTE PAIN OF LEFT SHOULDER: ICD-10-CM

## 2021-09-14 DIAGNOSIS — F41.9 ANXIETY: ICD-10-CM

## 2021-09-14 DIAGNOSIS — R12 HEARTBURN: ICD-10-CM

## 2021-09-14 DIAGNOSIS — J30.89 OTHER ALLERGIC RHINITIS: ICD-10-CM

## 2021-09-14 DIAGNOSIS — E78.2 MIXED HYPERLIPIDEMIA: ICD-10-CM

## 2021-09-14 PROCEDURE — 99214 OFFICE O/P EST MOD 30 MIN: CPT | Performed by: INTERNAL MEDICINE

## 2021-09-14 PROCEDURE — 3017F COLORECTAL CA SCREEN DOC REV: CPT | Performed by: INTERNAL MEDICINE

## 2021-09-14 PROCEDURE — G8399 PT W/DXA RESULTS DOCUMENT: HCPCS | Performed by: INTERNAL MEDICINE

## 2021-09-14 PROCEDURE — 4040F PNEUMOC VAC/ADMIN/RCVD: CPT | Performed by: INTERNAL MEDICINE

## 2021-09-14 PROCEDURE — G8427 DOCREV CUR MEDS BY ELIG CLIN: HCPCS | Performed by: INTERNAL MEDICINE

## 2021-09-14 PROCEDURE — 1090F PRES/ABSN URINE INCON ASSESS: CPT | Performed by: INTERNAL MEDICINE

## 2021-09-14 PROCEDURE — 1123F ACP DISCUSS/DSCN MKR DOCD: CPT | Performed by: INTERNAL MEDICINE

## 2021-09-14 RX ORDER — IRBESARTAN 300 MG/1
300 TABLET ORAL DAILY
Qty: 90 TABLET | Refills: 0 | Status: SHIPPED | OUTPATIENT
Start: 2021-09-14 | End: 2021-11-08 | Stop reason: SDUPTHER

## 2021-09-14 RX ORDER — LORATADINE 10 MG/1
TABLET ORAL
Qty: 30 TABLET | Refills: 3 | Status: SHIPPED | OUTPATIENT
Start: 2021-09-14 | End: 2022-11-03 | Stop reason: SDUPTHER

## 2021-09-14 RX ORDER — AMLODIPINE BESYLATE 5 MG/1
5 TABLET ORAL DAILY
Qty: 90 TABLET | Refills: 0 | Status: SHIPPED | OUTPATIENT
Start: 2021-09-14 | End: 2021-10-27

## 2021-09-14 RX ORDER — CITALOPRAM 10 MG/1
10 TABLET ORAL DAILY
Qty: 30 TABLET | Refills: 2 | Status: SHIPPED | OUTPATIENT
Start: 2021-09-14 | End: 2021-12-30 | Stop reason: SDUPTHER

## 2021-09-14 ASSESSMENT — PATIENT HEALTH QUESTIONNAIRE - PHQ9
1. LITTLE INTEREST OR PLEASURE IN DOING THINGS: 0
SUM OF ALL RESPONSES TO PHQ QUESTIONS 1-9: 0
SUM OF ALL RESPONSES TO PHQ9 QUESTIONS 1 & 2: 0
2. FEELING DOWN, DEPRESSED OR HOPELESS: 0
SUM OF ALL RESPONSES TO PHQ QUESTIONS 1-9: 0
SUM OF ALL RESPONSES TO PHQ QUESTIONS 1-9: 0

## 2021-09-14 NOTE — PROGRESS NOTES
2021    TELEHEALTH EVALUATION -- Audio/Visual (During SGPCV-64 public health emergency)    PLACE OF SERVICE: PATIENT'S HOME    HPI: SEE BELOW    Samir Garcia (:  1950) has requested an audio/video evaluation for the following concern(s):    HTN - TAKING MEDS. BP NOTED.  + DIET / EXERCISE COMPLIANCE . +  HEADACHE , DIZZINESS No.   C/O HEADACHE - FRONTAL. PAST FEW WEEKS. INTERMITTENT. DULL ACHE, NO RAD, PAIN SCALE 3/10, NO PHOTOPHOBIA, NO PHONOPHOBIA. NO VISUAL CHANGES. DENIES TRAUMA  C/O NECK PAIN - RT SIDE. PAST FEW DAYS. SHARP PAIN, NO RAD, PAIN SCALE 10/10 @ MAX. PAIN FREE NOW. NO NUMBNESS  TINGLING. DENIES TRAUMA  HLP - + DIET / EXERCISE COMPLIANCE. PREVIOUS LABS D/W PT   ANXIETY - MED NOT HELPING. .. NO DEPRESSION. OCC INSOMNIA. DENIES SUICIDAL / NO HOMICIDAL THOUGHTS / IDEATION   HEARTBURN - WAXES AND WANES. STATES TAKING MED.   VIT D DEF - TAKING MED. LABS D/W PT   ALLERGIC RHINITIS - NO RHINORRHEA, NO NASAL CONGESTION, NO POSTNASAL DRAINAGE , NO SINUS PRESSURE, + HA, OCC SNEEZING, + WATERY ITCHY EYES. LT SHOULDER PAIN -  INTERMITTENT. ? Tulio Search, ? PAIN SCALE. OA ON X RAY D/W PT      DENIES CP, No SOB, No PALPITATIONS, NO COUGH, NO F/C   No ABD PAIN, No N/V, No DIARRHEA, CONSTIPATION  IMPROVED, No MELENA, No HEMATOCHEZIA. No DYSURIA, No FREQ, + URGENCY - OLD, No HEMATURIA      Allergies   Allergen Reactions    Celebrex [Celecoxib] Other (See Comments)     Headache    Sulfa Antibiotics     Codeine      TYLENIOL # 3         Prior to Visit Medications    Medication Sig Taking?  Authorizing Provider   famotidine (PEPCID) 20 MG tablet TAKE 1 TABLET BY MOUTH TWICE DAILY Yes Morelia Beatty MD   fluorometholone (FML) 0.1 % ophthalmic suspension 1 drop every 4 hours Yes Historical Provider, MD   amLODIPine (NORVASC) 5 MG tablet Take 1 tablet by mouth daily Yes Morelia Beatty MD   irbesartan (AVAPRO) 300 MG tablet Take 1 tablet by mouth daily Yes Morelia Beatty MD   Cholecalciferol (VITAMIN D3) 25 MCG (1000 UT) CAPS Take 1 capsule by mouth daily Yes Valentino Starcher, MD   hydrOXYzine (ATARAX) 25 MG tablet Take 1 tablet by mouth every 8 hours as needed for Anxiety Yes Valentino Starcher, MD   naproxen (NAPROSYN) 500 MG tablet Take 1 tablet by mouth 2 times daily as needed for Pain Yes Valentino Starcher, MD   loratadine (CLARITIN) 10 MG tablet TAKE 1 TABLET BY MOUTH DAILY AS NEEDED Yes Valentino Starcher, MD   Elastic Bandages & Supports (JOBST RELIEF 30-40MMHG MEDIUM) MISC 1 Device by Does not apply route daily Yes Valentino Starcher, MD   diclofenac sodium (VOLTAREN) 1 % GEL APPLY 2 GRAMS EXTERNALLY TO THE AFFECTED AREA THREE TIMES DAILY AS NEEDED FOR PAIN Yes Valentino Starcher, MD   tolterodine (DETROL LA) 4 MG extended release capsule Take 1 capsule by mouth daily Yes Valentino Starcher, MD   sodium chloride (YULIYA 128) 5 % ophthalmic solution Place 1 drop into the left eye every 4 hours Yes Historical Provider, MD   calcium carbonate-vitamin D (CALTRATE 600+D) 600-400 MG-UNIT TABS per tab Take 1 tablet by mouth daily Yes Valentino Starcher, MD   Carboxymethylcellul-Glycerin (REFRESH OPTIVE) 0.5-0.9 % SOLN Apply to eye Yes Historical Provider, MD   fluticasone (FLONASE) 50 MCG/ACT nasal spray 2 sprays by Nasal route daily as needed for Rhinitis Yes Valentino Starcher, MD       Social History     Tobacco Use    Smoking status: Never Smoker    Smokeless tobacco: Never Used   Substance Use Topics    Alcohol use: No     Comment: QUIT > 20 YRS    Drug use: No        ROS: COMPREHENSIVE ROS AS IN HX, REST -VE  History obtained from chart review and the patient    PHYSICAL EXAMINATION:  [ INSTRUCTIONS:  \"[x]\" Indicates a positive item  \"[]\" Indicates a negative item  -- DELETE ALL ITEMS NOT EXAMINED]  Vital Signs: (As obtained by patient/caregiver or practitioner observation)    Blood pressure- 100/68 Heart rate-82    Respiratory rate-    Temperature-  Pulse oximetry-     Constitutional: [x] Appears well-developed and well-nourished [x] No apparent distress      [] Abnormal-   Mental status  [x] Alert and awake  [x] Oriented to person/place/time [x]Able to follow commands      Eyes:  EOM    [x]  Normal  [] Abnormal-  Sclera  [x]  Normal  [] Abnormal -         Discharge [x]  None visible  [] Abnormal -    HENT:   [x] Normocephalic, atraumatic. [] Abnormal   [x] Mouth/Throat: Mucous membranes are moist.     External Ears [x] Normal  [] Abnormal-     Neck: [x] No visualized mass     Pulmonary/Chest: [x] Respiratory effort normal.  [x] No visualized signs of difficulty breathing or respiratory distress        [] Abnormal-      Musculoskeletal:   [] Normal gait with no signs of ataxia         [x] Normal range of motion of neck        [] Abnormal-       Neurological:        [x] No Facial Asymmetry (Cranial nerve 7 motor function) (limited exam to video visit)          [x] No gaze palsy        [] Abnormal-         Skin:        [x] No significant exanthematous lesions or discoloration noted on facial skin         [] Abnormal-            Psychiatric:       [] Normal Affect [x] No Hallucinations        [x] Abnormal- OCC ANXIOUS AFFECT    Other pertinent observable physical exam findings-  NO SINUSTENDERNESS  NECK: + TENDERNESS RT SIDE. OCC PAIN WITH MVT. NO ROM  SHOULDER: + TENDERNESS LT, + PAIN WITH MVT, NO ROM      PREVIOUS LABS / X RAY  REVIEWED AND D/W PT      Impression       3 views demonstrate mild glenohumeral osteoarthritis. [facility] to 9 mm, with minimal AC joint arthritis. No fracture seen. ASSESSMENT/PLAN:     Diagnosis Orders   1. Essential hypertension  COUNSELLED. CONTINUE MEDS. LOW NA+ / DASH DIET/ EXERCISE. MONITOR. GOAL </= 120/80  MONITOR FOR HYPOTENSION  F/U LABS  MAKE CHANGES AS NEEDED. 2. Other headache syndrome  COUNSELLED. SYMPTOMATIC RX  ANALGESICS PRN  CT TO EVAL  MAKE CHANGES AS NEEDED. 3. Neck pain on right side  COUNSELLED. ? OA. EXERCISES. ANALGESICS PRN. MONITOR.   NAPROSYN PRN WITH FOOD - HAS MED. XR CERVICAL SPINE TO EVAL  MAKE CHANGES AS NEEDED. 4. Mixed hyperlipidemia  COUNSELLED. ADVISED LOW FAT / CHOL DIET/ EXERCISE.  MONITOR. F/U LAB  GOALS D/W PT.  MAKE CHANGES AS NEEDED. 5. Anxiety  COUNSELLED. CHANGE TO CELEXA 10 MG DAILY  PT COUNSELLED  ON RELAXATION TECHNIQUES. ADDRESSED STRESS MGT. MONITOR  PT NOT SUICIDAL/ NOT HOMICIDAL  MAKE CHANGES AS NEEDED. 6. Heartburn  COUNSELLED. CONTINUE MGT. ANTIREFLUX PRECAUTIONS ADVISED. MONITOR. MAKE CHANGES AS NEEDED. 7. Vitamin D deficiency  COUNSELLED. MONITOR ON VIT D SUPPLEMENT. MAKE CHANGES AS NEEDED. 8. Other allergic rhinitis  COUNSELLED. CLARITIN 10 MG DAILY/PRN. MONITOR. MAKE CHANGES AS NEEDED. 9. Acute pain of left shoulder  COUNSELLED. OA. EXERCISES. ANALGESICS PRN. MONITOR. NAPROSYN PRN WITH FOOD - HAS MED. DEFERRED PHY. THERAPY. MONITOR  MAKE CHANGES AS NEEDED. MEDICATION SIDE EFFECTS D/W PATIENT     RETURN TO CLINIC WITHIN 6 WEEKS / PRN     FOLLOW UP FOR LABS, X RAY, CT    Elzbieta Ortiz, was evaluated through a synchronous (real-time) audio-video encounter. The patient (or guardian if applicable) is aware that this is a billable service. Verbal consent to proceed has been obtained within the past 12 months. The visit was conducted pursuant to the emergency declaration under the [address] [place] [address], [address] [place] [address] authority and the [employer] Act. Patient identification was verified, and a caregiver was present when appropriate. The patient was located in a state where the provider was credentialed to provide care. --Fuentes Wade MD on 9/14/2021 at 8:30 PM    An electronic signature was used to authenticate this note.

## 2021-09-14 NOTE — PATIENT INSTRUCTIONS
TAKE MED AS ADVISED    DIET/ EXERCISE.     FOLLOW UP WITHIN 6 WEEKS / AS NEEDED     FOLLOW UP FOR LABS, X RAY, CT

## 2021-09-20 ENCOUNTER — HOSPITAL ENCOUNTER (OUTPATIENT)
Age: 71
Discharge: HOME | End: 2021-09-20
Payer: MEDICARE

## 2021-09-20 ENCOUNTER — HOSPITAL ENCOUNTER (OUTPATIENT)
Dept: GENERAL RADIOLOGY | Age: 71
Discharge: HOME | End: 2021-09-20
Payer: MEDICARE

## 2021-09-20 ENCOUNTER — HOSPITAL ENCOUNTER (OUTPATIENT)
Dept: CT IMAGING | Age: 71
Discharge: HOME | End: 2021-09-20
Payer: MEDICARE

## 2021-09-20 DIAGNOSIS — G44.89 OTHER HEADACHE SYNDROME: ICD-10-CM

## 2021-09-20 DIAGNOSIS — M54.2 NECK PAIN ON RIGHT SIDE: ICD-10-CM

## 2021-09-20 PROCEDURE — 72040 X-RAY EXAM NECK SPINE 2-3 VW: CPT

## 2021-09-20 PROCEDURE — 70450 CT HEAD/BRAIN W/O DYE: CPT

## 2021-10-27 ENCOUNTER — OFFICE VISIT (OUTPATIENT)
Dept: INTERNAL MEDICINE CLINIC | Age: 71
End: 2021-10-27
Payer: MEDICARE

## 2021-10-27 VITALS
TEMPERATURE: 96.7 F | BODY MASS INDEX: 25.48 KG/M2 | HEART RATE: 76 BPM | WEIGHT: 178 LBS | OXYGEN SATURATION: 98 % | DIASTOLIC BLOOD PRESSURE: 80 MMHG | HEIGHT: 70 IN | SYSTOLIC BLOOD PRESSURE: 148 MMHG

## 2021-10-27 DIAGNOSIS — E78.2 MIXED HYPERLIPIDEMIA: ICD-10-CM

## 2021-10-27 DIAGNOSIS — F41.9 ANXIETY: ICD-10-CM

## 2021-10-27 DIAGNOSIS — R12 HEARTBURN: ICD-10-CM

## 2021-10-27 DIAGNOSIS — I10 HYPERTENSION, UNCONTROLLED: Primary | ICD-10-CM

## 2021-10-27 DIAGNOSIS — J30.89 OTHER ALLERGIC RHINITIS: ICD-10-CM

## 2021-10-27 DIAGNOSIS — M54.2 NECK PAIN: ICD-10-CM

## 2021-10-27 DIAGNOSIS — M25.512 ACUTE PAIN OF LEFT SHOULDER: ICD-10-CM

## 2021-10-27 DIAGNOSIS — E55.9 VITAMIN D DEFICIENCY: ICD-10-CM

## 2021-10-27 PROCEDURE — G8399 PT W/DXA RESULTS DOCUMENT: HCPCS | Performed by: INTERNAL MEDICINE

## 2021-10-27 PROCEDURE — G8484 FLU IMMUNIZE NO ADMIN: HCPCS | Performed by: INTERNAL MEDICINE

## 2021-10-27 PROCEDURE — 99214 OFFICE O/P EST MOD 30 MIN: CPT | Performed by: INTERNAL MEDICINE

## 2021-10-27 PROCEDURE — 1090F PRES/ABSN URINE INCON ASSESS: CPT | Performed by: INTERNAL MEDICINE

## 2021-10-27 PROCEDURE — 1123F ACP DISCUSS/DSCN MKR DOCD: CPT | Performed by: INTERNAL MEDICINE

## 2021-10-27 PROCEDURE — G8417 CALC BMI ABV UP PARAM F/U: HCPCS | Performed by: INTERNAL MEDICINE

## 2021-10-27 PROCEDURE — 4040F PNEUMOC VAC/ADMIN/RCVD: CPT | Performed by: INTERNAL MEDICINE

## 2021-10-27 PROCEDURE — 3017F COLORECTAL CA SCREEN DOC REV: CPT | Performed by: INTERNAL MEDICINE

## 2021-10-27 PROCEDURE — 1036F TOBACCO NON-USER: CPT | Performed by: INTERNAL MEDICINE

## 2021-10-27 PROCEDURE — G8427 DOCREV CUR MEDS BY ELIG CLIN: HCPCS | Performed by: INTERNAL MEDICINE

## 2021-10-27 RX ORDER — AMLODIPINE BESYLATE 5 MG/1
7.5 TABLET ORAL DAILY
Qty: 135 TABLET | Refills: 0 | Status: SHIPPED | OUTPATIENT
Start: 2021-10-27 | End: 2021-12-30 | Stop reason: SDUPTHER

## 2021-10-27 ASSESSMENT — PATIENT HEALTH QUESTIONNAIRE - PHQ9
SUM OF ALL RESPONSES TO PHQ QUESTIONS 1-9: 0
2. FEELING DOWN, DEPRESSED OR HOPELESS: 0
SUM OF ALL RESPONSES TO PHQ QUESTIONS 1-9: 0
SUM OF ALL RESPONSES TO PHQ QUESTIONS 1-9: 0
1. LITTLE INTEREST OR PLEASURE IN DOING THINGS: 0
SUM OF ALL RESPONSES TO PHQ9 QUESTIONS 1 & 2: 0

## 2021-10-27 NOTE — PROGRESS NOTES
SUBJECTIVE:  Emil Rubin is a 73 y.o. female HERE FOR   Chief Complaint   Patient presents with    Hypertension    Anxiety    Other        PT HERE FOR EVAL      HTN - TAKING MEDS. BP ELEVATED.  + DIET / EXERCISE COMPLIANCE . +  HEADACHE , DIZZINESS No.   HLP - + DIET / EXERCISE COMPLIANCE. PREVIOUS LABS D/W PT   NECK PAIN -  STATES POST. PERSISTENT. NOW DULL PAIN, NO RAD, PAIN SCALE 3-4/10 @ MAX. ? NUMBNESS  TINGLING. DENIES TRAUMA  ANXIETY - TAKING MED -  HELPING, NO DEPRESSION.  INSOMNIA - IMPROVED. DENIES SUICIDAL / NO HOMICIDAL THOUGHTS / IDEATION   HEARTBURN - WAXES AND WANES. STATES TAKING MED.   VIT D DEF - TAKING MED. LABS D/W PT   LT SHOULDER PAIN -  INTERMITTENT. ? Manuel Gramajo, ? PAIN SCALE. ALLERGIC RHINITIS - NO RHINORRHEA, NO NASAL CONGESTION, NO POSTNASAL DRAINAGE , NO SINUS PRESSURE, + HA, OCC SNEEZING, + WATERY ITCHY EYES.   HEADACHE - RESOLVED. CT UNREMARKABLE     DENIES CP, No SOB, No PALPITATIONS, NO COUGH, NO F/C   No ABD PAIN, No N/V, No DIARRHEA, CONSTIPATION  IMPROVED, No MELENA, No HEMATOCHEZIA. No DYSURIA, No FREQ, + URGENCY - OLD, No HEMATURIA    PMH: REVIEWED AND UPDATED TODAY    PSH: REVIEWED AND UPDATED TODAY    SOCIAL HX: REVIEWED AND UPDATED TODAY    FAMILY HX: REVIEWED AND UPDATED TODAY    ALLERGY:  Celebrex [celecoxib], Sulfa antibiotics, and Codeine    MEDS: REVIEWED  Prior to Visit Medications    Medication Sig Taking?  Authorizing Provider   amLODIPine (NORVASC) 5 MG tablet Take 1 tablet by mouth daily Yes Zurdo Florence MD   irbesartan (AVAPRO) 300 MG tablet Take 1 tablet by mouth daily Yes Zurdo Florence MD   loratadine (CLARITIN) 10 MG tablet TAKE 1 TABLET BY MOUTH DAILY AS NEEDED Yes Zurdo Florence MD   citalopram (CELEXA) 10 MG tablet Take 1 tablet by mouth daily Yes Zurdo Florence MD   famotidine (PEPCID) 20 MG tablet TAKE 1 TABLET BY MOUTH TWICE DAILY Yes Zurdo Florence MD   fluorometholone (FML) 0.1 % ophthalmic suspension 1 drop every 4 hours Yes Historical Provider, MD   Cholecalciferol (VITAMIN D3) 25 MCG (1000 UT) CAPS Take 1 capsule by mouth daily Yes Zurdo Florence MD   naproxen (NAPROSYN) 500 MG tablet Take 1 tablet by mouth 2 times daily as needed for Pain Yes Zurdo Florence MD   Elastic Bandages & Supports (JOBST RELIEF 30-40MMHG MEDIUM) MISC 1 Device by Does not apply route daily Yes Zurdo Florence MD   diclofenac sodium (VOLTAREN) 1 % GEL APPLY 2 GRAMS EXTERNALLY TO THE AFFECTED AREA THREE TIMES DAILY AS NEEDED FOR PAIN Yes Zurdo Florence MD   tolterodine (DETROL LA) 4 MG extended release capsule Take 1 capsule by mouth daily Yes Zurdo Florence MD   sodium chloride (YULIYA 128) 5 % ophthalmic solution Place 1 drop into the left eye every 4 hours Yes Historical Provider, MD   calcium carbonate-vitamin D (CALTRATE 600+D) 600-400 MG-UNIT TABS per tab Take 1 tablet by mouth daily Yes Zurdo Florence MD   Carboxymethylcellul-Glycerin (REFRESH OPTIVE) 0.5-0.9 % SOLN Apply to eye Yes Historical Provider, MD   fluticasone (FLONASE) 50 MCG/ACT nasal spray 2 sprays by Nasal route daily as needed for Rhinitis Yes Zurdo Florence MD       ROS: COMPREHENSIVE ROS AS IN HX, REST -VE  History obtained from chart review and the patient       OBJECTIVE:   NURSING NOTE AND VITALS REVIEWED  BP (!) 162/80   Pulse 76   Temp 96.7 °F (35.9 °C)   Ht 5' 10\" (1.778 m)   Wt 178 lb (80.7 kg)   SpO2 98%   Breastfeeding No   BMI 25.54 kg/m²     NO ACUTE DISTRESS    REPEAT BP:  148/80 (RT), NO ORTHOSTASIS     Body mass index is 25.54 kg/m². HEENT: NO PALLOR, ANICTERIC, PERRLA, EOMI, NO CONJUNCTIVAL ERYTHEMA,                 NO SINUS TENDERNESS  NECK:  SUPPLE, TRACHEA MIDLINE, NT, NO JVD, NO CB, NO LA, NO TM, NO STIFFNESS  CHEST: RESPY EFFORT NL, GOOD AE, NO W/R/C  HEART: S1S2+ REG, NO M/G/R  ABD: SOFT, NT, NO HSM, BS+  EXT: NO EDEMA, NT, PULSES +.  LANDY'S -VE  NEURO: ALERT AND ORIENTED X 3, NO MENINGEAL SIGNS, NO TREMORS, NL GAIT, NO FOCAL DEFICITS  PSYCH: FAIRLY GOOD AFFECT  BACK: NT, NO ROM, NO CVA TENDERNESS  SHOULDER: + TENDERNESS LT, + PAIN WITH MVT, NO ROM      PREVIOUS LABS / X RAY / CT REVIEWED AND D/W PT    Impression       Mild multilevel degenerative disc disease and facet arthropathy without acute osseous abnormality. ASSESSMENT / PLAN:     Diagnosis Orders   1. Hypertension, uncontrolled  COUNSELLED. INCREASE NORVASC TO 7.5 MG. CONTINUE AVAPRO. ADVISED LOW NA+ / DASH DIET/ EXERCISE. MONITOR. GOAL </= 120/80  MAKE CHANGES AS NEEDED. 2. Mixed hyperlipidemia  COUNSELLED. DEFERRED MED  ADVISED LOW FAT / CHOL DIET/ EXERCISE.  MONITOR. GOALS D/W PT.  MAKE CHANGES AS NEEDED. 3. Neck pain  COUNSELLED. OA. EXERCISES. ANALGESICS PRN. MONITOR. diclofenac sodium (VOLTAREN) 1 % GEL PRN  HOME EXERCISES  MAKE CHANGES AS NEEDED. 4. Anxiety  COUNSELLED. CONTINUE MED  PT COUNSELLED  ON RELAXATION TECHNIQUES. ADDRESSED STRESS MGT. MONITOR  PT NOT SUICIDAL/ NOT HOMICIDAL  MAKE CHANGES AS NEEDED. 5. Heartburn  COUNSELLED. CONTINUE MGT. ANTIREFLUX PRECAUTIONS ADVISED. MONITOR. MAKE CHANGES AS NEEDED. 6. Vitamin D deficiency  COUNSELLED. MONITOR ON VIT D SUPPLEMENT. MAKE CHANGES AS NEEDED. 7. Acute pain of left shoulder  COUNSELLED. ? OA. EXERCISES. ANALGESICS PRN. MONITOR. diclofenac sodium (VOLTAREN) 1 % GEL PRN  HOME EXERCISES  MAKE CHANGES AS NEEDED       8. Other allergic rhinitis  COUNSELLED. MED PRN. MONITOR  MAKE CHANGES AS NEEDED.                PT DECLINED PNEUMONIA  VACCINE - READDRESS            MEDICATION SIDE EFFECTS D/W PATIENT    RETURN TO CLINIC WITHIN 2 MONTHS / PRN  NEEDS WELLNESS VISIT

## 2021-11-08 ENCOUNTER — TELEPHONE (OUTPATIENT)
Dept: INTERNAL MEDICINE CLINIC | Age: 71
End: 2021-11-08

## 2021-11-08 DIAGNOSIS — I10 ESSENTIAL HYPERTENSION: ICD-10-CM

## 2021-11-08 RX ORDER — IRBESARTAN 300 MG/1
300 TABLET ORAL DAILY
Qty: 90 TABLET | Refills: 0 | Status: SHIPPED | OUTPATIENT
Start: 2021-11-08 | End: 2022-02-01

## 2021-11-08 NOTE — TELEPHONE ENCOUNTER
Patient is requesting refill on ibersartan states that pharmacy informe dher that they never received the prescription from september please advise.

## 2021-12-30 ENCOUNTER — OFFICE VISIT (OUTPATIENT)
Dept: INTERNAL MEDICINE CLINIC | Age: 71
End: 2021-12-30
Payer: MEDICARE

## 2021-12-30 VITALS
OXYGEN SATURATION: 97 % | HEIGHT: 70 IN | SYSTOLIC BLOOD PRESSURE: 124 MMHG | HEART RATE: 72 BPM | WEIGHT: 178 LBS | DIASTOLIC BLOOD PRESSURE: 80 MMHG | BODY MASS INDEX: 25.48 KG/M2

## 2021-12-30 DIAGNOSIS — I10 PRIMARY HYPERTENSION: ICD-10-CM

## 2021-12-30 DIAGNOSIS — R12 HEARTBURN: ICD-10-CM

## 2021-12-30 DIAGNOSIS — M54.2 NECK PAIN: ICD-10-CM

## 2021-12-30 DIAGNOSIS — E78.2 MIXED HYPERLIPIDEMIA: ICD-10-CM

## 2021-12-30 DIAGNOSIS — E55.9 VITAMIN D DEFICIENCY: ICD-10-CM

## 2021-12-30 DIAGNOSIS — Z12.11 SCREEN FOR COLON CANCER: ICD-10-CM

## 2021-12-30 DIAGNOSIS — J30.89 OTHER ALLERGIC RHINITIS: ICD-10-CM

## 2021-12-30 DIAGNOSIS — Z00.00 ROUTINE GENERAL MEDICAL EXAMINATION AT A HEALTH CARE FACILITY: Primary | ICD-10-CM

## 2021-12-30 DIAGNOSIS — F41.9 ANXIETY: ICD-10-CM

## 2021-12-30 PROCEDURE — G8484 FLU IMMUNIZE NO ADMIN: HCPCS | Performed by: INTERNAL MEDICINE

## 2021-12-30 PROCEDURE — 1123F ACP DISCUSS/DSCN MKR DOCD: CPT | Performed by: INTERNAL MEDICINE

## 2021-12-30 PROCEDURE — G0439 PPPS, SUBSEQ VISIT: HCPCS | Performed by: INTERNAL MEDICINE

## 2021-12-30 PROCEDURE — 3017F COLORECTAL CA SCREEN DOC REV: CPT | Performed by: INTERNAL MEDICINE

## 2021-12-30 PROCEDURE — 4040F PNEUMOC VAC/ADMIN/RCVD: CPT | Performed by: INTERNAL MEDICINE

## 2021-12-30 RX ORDER — CITALOPRAM 10 MG/1
10 TABLET ORAL DAILY
Qty: 30 TABLET | Refills: 2 | Status: SHIPPED | OUTPATIENT
Start: 2021-12-30 | End: 2022-03-30 | Stop reason: SDUPTHER

## 2021-12-30 RX ORDER — NAPROXEN 500 MG/1
500 TABLET ORAL 2 TIMES DAILY PRN
Qty: 60 TABLET | Refills: 0 | Status: SHIPPED | OUTPATIENT
Start: 2021-12-30

## 2021-12-30 RX ORDER — FAMOTIDINE 20 MG/1
TABLET, FILM COATED ORAL
Qty: 180 TABLET | Refills: 0 | Status: SHIPPED | OUTPATIENT
Start: 2021-12-30 | End: 2022-03-30 | Stop reason: SDUPTHER

## 2021-12-30 RX ORDER — AMLODIPINE BESYLATE 5 MG/1
7.5 TABLET ORAL DAILY
Qty: 135 TABLET | Refills: 0 | Status: SHIPPED | OUTPATIENT
Start: 2021-12-30 | End: 2022-02-08

## 2021-12-30 ASSESSMENT — PATIENT HEALTH QUESTIONNAIRE - PHQ9
1. LITTLE INTEREST OR PLEASURE IN DOING THINGS: 0
SUM OF ALL RESPONSES TO PHQ9 QUESTIONS 1 & 2: 0
SUM OF ALL RESPONSES TO PHQ QUESTIONS 1-9: 0
SUM OF ALL RESPONSES TO PHQ QUESTIONS 1-9: 0
2. FEELING DOWN, DEPRESSED OR HOPELESS: 0
SUM OF ALL RESPONSES TO PHQ QUESTIONS 1-9: 0

## 2021-12-30 ASSESSMENT — LIFESTYLE VARIABLES: HOW OFTEN DO YOU HAVE A DRINK CONTAINING ALCOHOL: 0

## 2021-12-30 NOTE — PROGRESS NOTES
Medicare Annual Wellness Visit  Name: Bonnie Rosas Date: 2021   MRN: [id number] Sex: Female   Age: 73 y.o. Ethnicity: Non- / Non    : 1950 Race: Black /       Jose Osorio is here for Medicare AWV, Hypertension, Hyperlipidemia, and Other      LAST PHYSICAL > 1 YR    HTN - TAKING MEDS - TOLERATING . IMPROVED - BP NOTED + DIET / EXERCISE COMPLIANCE . OCC  HEADACHE , DIZZINESS No.   HLP - + DIET / EXERCISE COMPLIANCE. PREVIOUS LABS D/W PT   NECK PAIN -  POST. INTERMITTENT. NOW DULL PAIN, NO RAD, PAIN SCALE 4/10. NO NUMBNESS  TINGLING. DENIES TRAUMA  ANXIETY - TAKING MED -  HELPING, NO DEPRESSION.  INSOMNIA - IMPROVED. DENIES SUICIDAL / NO HOMICIDAL THOUGHTS / IDEATION   HEARTBURN -  TAKING MED. WAXES AND WANES.  VIT D DEF - TAKING MED. LABS D/W PT   ALLERGIC RHINITIS - NO RHINORRHEA, NO NASAL CONGESTION, NO POSTNASAL DRAINAGE , NO SINUS PRESSURE, OCC HA, OCC SNEEZING, + WATERY ITCHY EYES.   SCREENING COLON CA D/W PT     DENIES CP, No SOB, No PALPITATIONS, NO COUGH, NO F/C   No ABD PAIN, No N/V, No DIARRHEA, CONSTIPATION  IMPROVED, No MELENA, No HEMATOCHEZIA. No DYSURIA, No FREQ, + URGENCY - OLD, No HEMATURIA    ROS: COMPREHENSIVE ROS AS IN HX, REST -VE  History obtained from chart review and the patient    Screenings for behavioral, psychosocial and functional/safety risks, and cognitive dysfunction are all negative except as indicated below. These results, as well as other patient data from the [address] form, are documented in Flowsheets linked to this Encounter. Allergies   Allergen Reactions    Celebrex [Celecoxib] Other (See Comments)     Headache    Sulfa Antibiotics     Codeine      TYLENIOL # 3       Prior to Visit Medications    Medication Sig Taking?  Authorizing Provider   irbesartan (AVAPRO) 300 MG tablet Take 1 tablet by mouth daily Yes Nena Chaves MD   amLODIPine (NORVASC) 5 MG tablet Take 1.5 tablets by mouth daily Yes Mal Blocker Shanna Alexis MD   diclofenac sodium (VOLTAREN) 1 % GEL APPLY 2 GRAMS EXTERNALLY TO THE AFFECTED AREA THREE TIMES DAILY AS NEEDED FOR PAIN Yes Alverto Renteria MD   loratadine (CLARITIN) 10 MG tablet TAKE 1 TABLET BY MOUTH DAILY AS NEEDED Yes Alverto Renteria MD   citalopram (CELEXA) 10 MG tablet Take 1 tablet by mouth daily Yes Alverto Renteria MD   famotidine (PEPCID) 20 MG tablet TAKE 1 TABLET BY MOUTH TWICE DAILY Yes Alverto Renteria MD   fluorometholone (FML) 0.1 % ophthalmic suspension 1 drop every 4 hours Yes Historical Provider, MD   Cholecalciferol (VITAMIN D3) 25 MCG (1000 UT) CAPS Take 1 capsule by mouth daily Yes Alverto Renteria MD   naproxen (NAPROSYN) 500 MG tablet Take 1 tablet by mouth 2 times daily as needed for Pain Yes Alverto Renteria MD   Elastic Bandages & Supports (JOBST RELIEF 30-40MMHG MEDIUM) MISC 1 Device by Does not apply route daily Yes Alverto Renteria MD   tolterodine (DETROL LA) 4 MG extended release capsule Take 1 capsule by mouth daily Yes Alverto Renteria MD   sodium chloride (YULIYA 128) 5 % ophthalmic solution Place 1 drop into the left eye every 4 hours Yes Historical Provider, MD   calcium carbonate-vitamin D (CALTRATE 600+D) 600-400 MG-UNIT TABS per tab Take 1 tablet by mouth daily Yes Alverto Renteria MD   Carboxymethylcellul-Glycerin (REFRESH OPTIVE) 0.5-0.9 % SOLN Apply to eye Yes Historical Provider, MD   fluticasone (FLONASE) 50 MCG/ACT nasal spray 2 sprays by Nasal route daily as needed for Rhinitis Yes Alverto Renteria MD         Past Medical History:   Diagnosis Date    Anemia     Asthma     CHILDHOOD    Bone lesion     Hyperlipidemia     Hypertension     Thyroid disorder        Past Surgical History:   Procedure Laterality Date    BREAST BIOPSY      DOMINICK    DILATION AND CURETTAGE OF UTERUS      MULTIPLE    EYE SURGERY      KNEE ARTHROSCOPY      RT FOR MENISCEAL TEAR    UTERINE FIBROID EMBOLIZATION           Family History   Problem Relation Age of Onset    Diabetes Mother     Cancer Mother         OVARIAN    Heart Disease Sister     Kidney Disease Sister     High Blood Pressure Sister     Diabetes Maternal Grandmother     Cancer Maternal Grandmother         STOMACH    Stroke Maternal Grandmother     Heart Disease Maternal Grandmother     High Blood Pressure Maternal Grandmother     Heart Disease Other         GRANDMOTHER    Cancer Brother 77        COLON    High Blood Pressure Brother        CareTeam (Including outside providers/suppliers regularly involved in providing care):   Patient Care Team:  Denice Dey MD as PCP - General (Internal Medicine)  Denice Dey MD [facility] PCP [facility]    Wt Readings from Last 3 Encounters:   12/30/21 178 lb (80.7 kg)   10/27/21 178 lb (80.7 kg)   06/14/21 178 lb 9.6 oz (81 kg)     Vitals:    12/30/21 1139   BP: 120/80   Site: Right Upper Arm   Position: Sitting   Cuff Size: Medium Adult   Pulse: 72   SpO2: 97%   Weight: 178 lb (80.7 kg)   Height: 5' 10\" (1.778 m)       Based upon direct observation of the patient, evaluation of cognition reveals recent and remote memory intact. Patient's complete Health Risk Assessment and screening values have been reviewed and are found in Flowsheets. The following problems were reviewed today and where indicated follow up appointments were made and/or referrals ordered. Positive Risk Factor Screenings with Interventions:              General Health and ACP:  General  In general, how would you say your health is?: Good  In the past 7 days, have you experienced any of the following?  New or Increased Pain, New or Increased Fatigue, Loneliness, Social Isolation, Stress or Anger?: (!) Anger  Do you get the social and emotional support that you need?: Yes  Do you have a Living Will?: (!) No  Advance Directives     Power of [facility] Will ACP-Advance Directive ACP-Power of     Not on File Not on File Not on File Not on [address] [employer] Risk Interventions:  · Anger: patient's comments regarding reasons for stress and/or anger: STATES FAMILY ISSUES  · No Living Will: Advance Care Planning addressed with patient today and PT WILL READDRESS WITH FAMILY          Personalized Preventive Plan   Current Health Maintenance Status    There is no immunization history on file for this patient. Health Maintenance   Topic Date Due    COVID-19 Vaccine (1) Never done    DTaP/Tdap/Td vaccine (1 - Tdap) Never done    Shingles Vaccine (1 of 2) Never done    Annual Wellness Visit (AWV)  01/27/2021    Colon cancer screen colonoscopy  11/16/2021    Pneumococcal 65+ years Vaccine (1 of 1 - PPSV23) 01/20/2022 (Originally 9/22/2017)    Flu vaccine (1) 09/14/2022 (Originally 9/1/2021)    Diabetes screen  06/17/2022    Potassium monitoring  09/16/2022    Creatinine monitoring  09/16/2022    Breast cancer screen  10/20/2022    Lipid screen  09/16/2026    DEXA (modify frequency per FRAX score)  Completed    Hepatitis C screen  Completed    Hepatitis A vaccine  Aged Out    Hepatitis B vaccine  Aged Out    Hib vaccine  Aged Out    Meningococcal (ACWY) vaccine  Aged Out          OBJECTIVE:   NURSING NOTE AND VITALS REVIEWED  /80 (Site: Right Upper Arm, Position: Sitting, Cuff Size: Medium Adult)   Pulse 72   Ht 5' 10\" (1.778 m)   Wt 178 lb (80.7 kg)   SpO2 97%   BMI 25.54 kg/m²     NO ACUTE DISTRESS    REPEAT BP: 124/80 (RT), NO ORTHOSTASIS     Body mass index is 25.54 kg/m². HEENT: NO PALLOR, ANICTERIC, PERRLA, EOMI, NO CONJUNCTIVAL ERYTHEMA,                 NO SINUS TENDERNESS, NO CERUMEN IMPACTION, NO TM ERYTHEMA  NECK:  SUPPLE, TRACHEA MIDLINE, NT, OCC PAIN WITH MVT, NO JVD, NO CB, NO LA, NO TM, NO ROM  CHEST: RESPY EFFORT NL, GOOD AE, NO W/R/C  HEART: S1S2+ REG, NO M/G/R  ABD: SOFT, NT, NO HSM, BS+  EXT: NO EDEMA, NT, PULSES +.  LANDY'S -VE  NEURO: ALERT AND ORIENTED X 3, NO MENINGEAL SIGNS, NO TREMORS, NL GAIT, NO FOCAL DEFICITS  PSYCH: FAIRLY GOOD AFFECT. RECALL 3/3  BACK: NT, NO ROM, NO CVA TENDERNESS     PREVIOUS LABS REVIEWED AND D/W PT    Diagnoses and all orders for this visit:  ASSESSMENT / PLAN:     Diagnosis Orders   1. Routine general medical examination at a health care facility  COUNSELLED. HEALTH / SAFETY EDUCATION REVIEWED AND ADVISED  HEALTH MAINTENANCE UPDATED  IMMUNIZATION REVIEWED - PT DECLINED - READDRESS  ADVISED ON ANGER / STRESS MGT, LIVING WILL  MAKE CHANGES AS NEEDED. 2. Primary hypertension  COUNSELLED. IMPROVED. CONTINUE MEDS. ADVISED LOW NA+ / DASH DIET/ EXERCISE. MONITOR. GOAL </= 130/80  MAKE CHANGES AS NEEDED. 3. Mixed hyperlipidemia  COUNSELLED. ADVISED LOW FAT / CHOL DIET/ EXERCISE.  MONITOR. GOALS D/W PT.  MAKE CHANGES AS NEEDED. 4. Neck pain  COUNSELLED. ? OA. EXERCISES. ANALGESICS PRN. MONITOR. NAPROSYN 500 MG BID / PRN WITH FOOD. HOME EXERCISES  MAKE CHANGES AS NEEDED. 5. Anxiety  COUNSELLED. CONTINUE CELEXA 10 MG  PT COUNSELLED  ON RELAXATION TECHNIQUES. ADDRESSED STRESS MGT. MONITOR  PT NOT SUICIDAL/ NOT HOMICIDAL  MAKE CHANGES AS NEEDED. 6. Heartburn  COUNSELLED. CONTINUE MGT. ANTIREFLUX PRECAUTIONS ADVISED. MONITOR. MAKE CHANGES AS NEEDED. 7. Vitamin D deficiency  COUNSELLED. MONITOR ON VIT D SUPPLEMENT. MAKE CHANGES AS NEEDED. 8. Other allergic rhinitis  COUNSELLED. MED PRN. MONITOR  MAKE CHANGES AS NEEDED. 9. Screen for colon cancer  COUNSELLED. REFER GI FOR C SCOPE            PT DECLINED PNEUMONIA VACCINE - READDRESS     ADVISED ON COVID VACCINE - PT VERBALIZED UNDERSTANDING      Recommendations for Preventive Services Due: see orders and patient instructions/AVS.    Recommended screening schedule for the next 5-10 years is provided to the patient in written form: see Patient Instructions/AVS.    Roe Person was seen today for medicare awv, hypertension, hyperlipidemia and other.             MEDICATION SIDE EFFECTS D/W PATIENT    RETURN TO CLINIC WITHIN 3 MONTHS / PRN    FOLLOW UP FOR COLONOSCOPY

## 2021-12-30 NOTE — PATIENT INSTRUCTIONS
TAKE MED AS ADVISED    DIET/ EXERCISE. FOLLOW UP WITHIN 3 MONTHS / AS NEEDED    FOLLOW UP FOR COLONOSCOPY    Personalized Preventive Plan for Enola Goodpasture - 12/30/2021  Medicare offers a range of preventive health benefits. Some of the tests and screenings are paid in full while other may be subject to a deductible, co-insurance, and/or copay. Some of these benefits include a comprehensive review of your medical history including lifestyle, illnesses that may run in your family, and various assessments and screenings as appropriate. After reviewing your medical record and screening and assessments performed today your provider may have ordered immunizations, labs, imaging, and/or referrals for you. A list of these orders (if applicable) as well as your Preventive Care list are included within your After Visit Summary for your review. Other Preventive Recommendations:    · A preventive eye exam performed by an eye specialist is recommended every 1-2 years to screen for glaucoma; cataracts, macular degeneration, and other eye disorders. · A preventive dental visit is recommended every 6 months. · Try to get at least 150 minutes of exercise per week or 10,000 steps per day on a pedometer . · Order or download the FREE \"Exercise & Physical Activity: Your Everyday Guide\" from [employer]. Call 2-579.769.9512 or search [employer] online. · You need 2998-2838 mg of calcium and 1117-1493 IU of vitamin D per day. It is possible to meet your calcium requirement with diet alone, but a vitamin D supplement is usually necessary to meet this goal.  · When exposed to the sun, use a sunscreen that protects against both UVA and UVB radiation with an SPF of 30 or greater. Reapply every 2 to 3 hours or after sweating, drying off with a towel, or swimming. · Always wear a seat belt when traveling in a car. Always wear a helmet when riding a bicycle or motorcycle.

## 2022-01-12 ENCOUNTER — TELEPHONE (OUTPATIENT)
Dept: ORTHOPEDIC SURGERY | Age: 72
End: 2022-01-12

## 2022-01-12 NOTE — TELEPHONE ENCOUNTER
PA submitted via CM for Famotidine 20MG tablets.   Mirlande Grande - PA Case ID: [id number]    STATUS: PENDING

## 2022-01-13 NOTE — TELEPHONE ENCOUNTER
Received DENIAL for Famotidine 20MG tablets as they are a PLAN EXCLUSION. Denial letter attached. Please advise patient. Thank you.

## 2022-02-01 DIAGNOSIS — I10 ESSENTIAL HYPERTENSION: ICD-10-CM

## 2022-02-01 RX ORDER — IRBESARTAN 300 MG/1
300 TABLET ORAL DAILY
Qty: 90 TABLET | Refills: 0 | Status: SHIPPED | OUTPATIENT
Start: 2022-02-01 | End: 2022-05-02

## 2022-02-07 DIAGNOSIS — I10 PRIMARY HYPERTENSION: ICD-10-CM

## 2022-02-08 ENCOUNTER — TELEPHONE (OUTPATIENT)
Dept: INTERNAL MEDICINE CLINIC | Age: 72
End: 2022-02-08

## 2022-02-08 RX ORDER — AMLODIPINE BESYLATE 5 MG/1
TABLET ORAL
Qty: 135 TABLET | Refills: 0 | Status: SHIPPED | OUTPATIENT
Start: 2022-02-08 | End: 2022-05-02

## 2022-03-30 ENCOUNTER — OFFICE VISIT (OUTPATIENT)
Dept: INTERNAL MEDICINE CLINIC | Age: 72
End: 2022-03-30
Payer: COMMERCIAL

## 2022-03-30 VITALS
HEART RATE: 66 BPM | WEIGHT: 183.4 LBS | TEMPERATURE: 97.1 F | BODY MASS INDEX: 26.26 KG/M2 | OXYGEN SATURATION: 98 % | DIASTOLIC BLOOD PRESSURE: 80 MMHG | SYSTOLIC BLOOD PRESSURE: 128 MMHG | HEIGHT: 70 IN

## 2022-03-30 DIAGNOSIS — E55.9 VITAMIN D DEFICIENCY: ICD-10-CM

## 2022-03-30 DIAGNOSIS — M54.2 NECK PAIN: Primary | ICD-10-CM

## 2022-03-30 DIAGNOSIS — E78.2 MIXED HYPERLIPIDEMIA: ICD-10-CM

## 2022-03-30 DIAGNOSIS — F41.9 ANXIETY: ICD-10-CM

## 2022-03-30 DIAGNOSIS — I10 PRIMARY HYPERTENSION: ICD-10-CM

## 2022-03-30 DIAGNOSIS — J30.89 OTHER ALLERGIC RHINITIS: ICD-10-CM

## 2022-03-30 DIAGNOSIS — R12 HEARTBURN: ICD-10-CM

## 2022-03-30 PROCEDURE — G8427 DOCREV CUR MEDS BY ELIG CLIN: HCPCS | Performed by: INTERNAL MEDICINE

## 2022-03-30 PROCEDURE — 4040F PNEUMOC VAC/ADMIN/RCVD: CPT | Performed by: INTERNAL MEDICINE

## 2022-03-30 PROCEDURE — 3017F COLORECTAL CA SCREEN DOC REV: CPT | Performed by: INTERNAL MEDICINE

## 2022-03-30 PROCEDURE — 1090F PRES/ABSN URINE INCON ASSESS: CPT | Performed by: INTERNAL MEDICINE

## 2022-03-30 PROCEDURE — G8484 FLU IMMUNIZE NO ADMIN: HCPCS | Performed by: INTERNAL MEDICINE

## 2022-03-30 PROCEDURE — G8417 CALC BMI ABV UP PARAM F/U: HCPCS | Performed by: INTERNAL MEDICINE

## 2022-03-30 PROCEDURE — 1123F ACP DISCUSS/DSCN MKR DOCD: CPT | Performed by: INTERNAL MEDICINE

## 2022-03-30 PROCEDURE — 1036F TOBACCO NON-USER: CPT | Performed by: INTERNAL MEDICINE

## 2022-03-30 PROCEDURE — G8399 PT W/DXA RESULTS DOCUMENT: HCPCS | Performed by: INTERNAL MEDICINE

## 2022-03-30 PROCEDURE — 99214 OFFICE O/P EST MOD 30 MIN: CPT | Performed by: INTERNAL MEDICINE

## 2022-03-30 RX ORDER — TIZANIDINE 4 MG/1
4 TABLET ORAL NIGHTLY PRN
Qty: 30 TABLET | Refills: 0 | Status: SHIPPED | OUTPATIENT
Start: 2022-03-30

## 2022-03-30 RX ORDER — FAMOTIDINE 20 MG/1
TABLET, FILM COATED ORAL
Qty: 180 TABLET | Refills: 0 | Status: SHIPPED | OUTPATIENT
Start: 2022-03-30 | End: 2022-11-03 | Stop reason: ALTCHOICE

## 2022-03-30 RX ORDER — CITALOPRAM 10 MG/1
10 TABLET ORAL DAILY
Qty: 30 TABLET | Refills: 2 | Status: SHIPPED | OUTPATIENT
Start: 2022-03-30

## 2022-03-30 ASSESSMENT — PATIENT HEALTH QUESTIONNAIRE - PHQ9
SUM OF ALL RESPONSES TO PHQ QUESTIONS 1-9: 0
SUM OF ALL RESPONSES TO PHQ QUESTIONS 1-9: 0
SUM OF ALL RESPONSES TO PHQ9 QUESTIONS 1 & 2: 0
SUM OF ALL RESPONSES TO PHQ QUESTIONS 1-9: 0
SUM OF ALL RESPONSES TO PHQ QUESTIONS 1-9: 0
1. LITTLE INTEREST OR PLEASURE IN DOING THINGS: 0
2. FEELING DOWN, DEPRESSED OR HOPELESS: 0

## 2022-03-30 NOTE — PROGRESS NOTES
SUBJECTIVE:  Kiley Pittman is a 73 y.o. female [address]   Chief Complaint   Patient presents with    3 Month Follow-Up    Hypertension    Other     PT SAYS SHES HAVING NECK PAIN        PT HERE FOR EVAL    NECK PAIN -  MOSTLY RT POST. PERSISTENT.  Richarda Blow PAIN. ? RAD, PAIN SCALE 7/10. Manda Pontiff. DENIES RECENT TRAUMA   HTN - TAKING MEDS.  + DIET / EXERCISE COMPLIANCE . NO  HEADACHE , DIZZINESS No.   HLP - + DIET / EXERCISE COMPLIANCE. PREVIOUS LABS D/W PT   ANXIETY - ? MED COMPLIANCE, NO DEPRESSION.  INSOMNIA - IMPROVED. DENIES SUICIDAL / NO HOMICIDAL THOUGHTS / IDEATION   HEARTBURN -  TAKING MED. WAXES AND WANES.  VIT D DEF - TAKING MED. LABS D/W PT   ALLERGIC RHINITIS - NO RHINORRHEA, NO NASAL CONGESTION, NO POSTNASAL DRAINAGE , NO SINUS PRESSURE, NO HA, OCC SNEEZING, + WATERY ITCHY EYES.       DENIES CP, No SOB, No PALPITATIONS, NO COUGH, NO F/C   No ABD PAIN, No N/V, No DIARRHEA, CONSTIPATION  IMPROVED, No MELENA, No HEMATOCHEZIA. No DYSURIA, No FREQ, + URGENCY - OLD, No HEMATURIA        PMH: REVIEWED AND UPDATED TODAY    PSH: REVIEWED AND UPDATED TODAY    SOCIAL HX: REVIEWED AND UPDATED TODAY    FAMILY HX: REVIEWED AND UPDATED TODAY    ALLERGY:  Celebrex [celecoxib], Sulfa antibiotics, and Codeine    MEDS: REVIEWED  Prior to Visit Medications    Medication Sig Taking?  Authorizing Provider   amLODIPine (NORVASC) 5 MG tablet TAKE 1 AND 1/2 TABLETS BY MOUTH DAILY Yes Elizabeth Browne MD   irbesartan (AVAPRO) 300 MG tablet TAKE 1 TABLET BY MOUTH DAILY Yes Elizabeth Browne MD   citalopram (CELEXA) 10 MG tablet Take 1 tablet by mouth daily Yes Elizabeth Browne MD   famotidine (PEPCID) 20 MG tablet TAKE 1 TABLET BY MOUTH TWICE DAILY Yes Elizabeth Browne MD   Cholecalciferol (VITAMIN D3) 25 MCG (1000 UT) CAPS Take 1 capsule by mouth daily Yes Elizabeth Browne MD   naproxen (NAPROSYN) 500 MG tablet Take 1 tablet by mouth 2 times daily as needed for Pain Yes Elizabeth Browne MD   diclofenac sodium (VOLTAREN) 1 % GEL APPLY 2 GRAMS EXTERNALLY TO THE AFFECTED AREA THREE TIMES DAILY AS NEEDED FOR PAIN Yes Chepe Gore MD   loratadine (CLARITIN) 10 MG tablet TAKE 1 TABLET BY MOUTH DAILY AS NEEDED Yes Chepe Gore MD   fluorometholone (FML) 0.1 % ophthalmic suspension 1 drop every 4 hours Yes Historical Provider, MD   Elastic Bandages & Supports (JOBST RELIEF 30-40MMHG MEDIUM) MISC 1 Device by Does not apply route daily Yes Chepe Gore MD   tolterodine (DETROL LA) 4 MG extended release capsule Take 1 capsule by mouth daily Yes Chepe Gore MD   sodium chloride (YULIYA 128) 5 % ophthalmic solution Place 1 drop into the left eye every 4 hours Yes Historical Provider, MD   calcium carbonate-vitamin D (CALTRATE 600+D) 600-400 MG-UNIT TABS per tab Take 1 tablet by mouth daily Yes Chepe Gore MD   Carboxymethylcellul-Glycerin (REFRESH OPTIVE) 0.5-0.9 % SOLN Apply to eye Yes Historical Provider, MD   fluticasone (FLONASE) 50 MCG/ACT nasal spray 2 sprays by Nasal route daily as needed for Rhinitis Yes hCepe Gore MD       ROS: COMPREHENSIVE ROS AS IN HX, REST -VE  History obtained from chart review and the patient       OBJECTIVE:   NURSING NOTE AND VITALS REVIEWED  /76 (Site: Left Upper Arm, Position: Sitting, Cuff Size: Large Adult)   Pulse 66   Temp 97.1 °F (36.2 °C)   Ht 5' 10\" (1.778 m)   Wt 183 lb 6.4 oz (83.2 kg)   SpO2 98%   BMI 26.32 kg/m²     NO ACUTE DISTRESS    REPEAT BP: 128/80 (LT), NO ORTHOSTASIS     Body mass index is 26.32 kg/m². HEENT: NO PALLOR, ANICTERIC, PERRLA, EOMI, NO CONJUNCTIVAL ERYTHEMA,                 NO SINUS TENDERNESS  NECK:  TRACHEA MIDLINE, + TENDERNESS POSTLAT - RT, + MUSCLE SPASM, + PAIN WITH MVT, OCC ROM. NO JVD, NO CB, NO LA, NO TM.  CHEST: RESPY EFFORT NL, GOOD AE, NO W/R/C  HEART: S1S2+ REG, NO M/G/R  ABD: SOFT, NT, NO HSM, BS+  EXT: NO EDEMA, NT, PULSES +.  LANDY'S -VE  NEURO: ALERT AND ORIENTED X 3, NO MENINGEAL SIGNS, NO TREMORS, NL GAIT, NO FOCAL DEFICITS  PSYCH: FAIRLY GOOD AFFECT  BACK: NT, NO ROM, NO CVA TENDERNESS     PREVIOUS LABS REVIEWED AND D/W PT    ASSESSMENT / PLAN:     Diagnosis Orders   1. Neck pain / Spasm  COUNSELLED. ? OA. EXERCISES. ANALGESICS PRN. MONITOR. NAPROSYN PRN WITH FOOD - PT HAD MED  ADD tiZANidine (ZANAFLEX) 4 MG PRN - S/E D/W PT  PT DEFERRED PHYSICAL THERAPY  ADVISED ON HOME EXERCISES  MAKE CHANGES AS NEEDED. 2. Primary hypertension  COUNSELLED. CONTINUE MEDS. LOW NA+ / DASH DIET/ EXERCISE. MONITOR. GOAL </= 130/80  MAKE CHANGES AS NEEDED. 3. Mixed hyperlipidemia  COUNSELLED. ADVISED LOW FAT / CHOL DIET/ EXERCISE.  MONITOR. GOALS D/W PT.  MAKE CHANGES AS NEEDED. 4. Anxiety  COUNSELLED. ADVISED MED COMPLIANCE - CELEXA REFILLED  PT COUNSELLED  ON RELAXATION TECHNIQUES. ADDRESSED STRESS MGT. MONITOR  PT NOT SUICIDAL/ NOT HOMICIDAL  MAKE CHANGES AS NEEDED. 5. Heartburn  COUNSELLED. CONTINUE MGT. ANTIREFLUX PRECAUTIONS ADVISED. MONITOR. MAKE CHANGES AS NEEDED. 6. Vitamin D deficiency  COUNSELLED. MONITOR ON VIT D SUPPLEMENT. MAKE CHANGES AS NEEDED. 7. Other allergic rhinitis  COUNSELLED. MED PRN. MONITOR  MAKE CHANGES AS NEEDED.                      MEDICATION SIDE EFFECTS D/W PATIENT    RETURN TO CLINIC WITHIN 2-3 MONTHS / PRN

## 2022-05-02 DIAGNOSIS — I10 ESSENTIAL HYPERTENSION: ICD-10-CM

## 2022-05-02 DIAGNOSIS — I10 PRIMARY HYPERTENSION: ICD-10-CM

## 2022-05-02 RX ORDER — IRBESARTAN 300 MG/1
300 TABLET ORAL DAILY
Qty: 90 TABLET | Refills: 0 | Status: SHIPPED | OUTPATIENT
Start: 2022-05-02 | End: 2022-08-05

## 2022-05-02 RX ORDER — AMLODIPINE BESYLATE 5 MG/1
TABLET ORAL
Qty: 135 TABLET | Refills: 0 | Status: SHIPPED | OUTPATIENT
Start: 2022-05-02 | End: 2022-08-05

## 2022-05-19 ENCOUNTER — NURSE TRIAGE (OUTPATIENT)
Dept: OTHER | Facility: CLINIC | Age: 72
End: 2022-05-19

## 2022-05-19 ENCOUNTER — TELEPHONE (OUTPATIENT)
Dept: INTERNAL MEDICINE CLINIC | Age: 72
End: 2022-05-19

## 2022-05-19 NOTE — TELEPHONE ENCOUNTER
Per Dr. King Sosa please make pt appt continue beta blocker, ACEI  BMP sent HgA1C. FS on admission. Last dose of Farxiga 01/27/22. Medical clearance pending.

## 2022-05-19 NOTE — TELEPHONE ENCOUNTER
Patient went to Urgent Care and was told that she had an infection. She was given prescriptions for:  Clindamycin 300 mg caps. Doxycycline Hyc 100 mg caps. Prednisone 20 mg caps  She does not want to do a VV today.

## 2022-05-19 NOTE — TELEPHONE ENCOUNTER
[facility] with Red Flag Complaint. Subjective: Caller states \"It goes down in the morning. When I walk it don't swell but it is when I sit still. I still move around, it just feels tight. I wear support socks but that hurts more. Most of the time it is my L one that swells but this time it is the R. My calf is swollen and tight but no pain. \"     Current Symptoms: R ankle swelling- goes to knee, warmth, tightness     Denies SOB and CP     Onset: 2 weeks ago; intermittent    Associated Symptoms: NA    Pain Severity: 7/10; tightness/discomfort ; intermittent    Temperature:denies     What has been tried: elevating doesn't help, walking helps, compression socks, soft shoes       Recommended disposition: See HCP within 4 Hours (or PCP triage)    Care advice provided, patient verbalizes understanding; denies any other questions or concerns; instructed to call back for any new or worsening symptoms. Writer provided warm transfer to [facility]  at [address]  for further recommendation      Attention Provider: Thank you for allowing me to participate in the care of your patient. The patient was connected to triage in response to information provided to the ECC/PSC. Please do not respond through this encounter as the response is not directed to a shared pool.       Reason for Disposition   Swelling of calf or leg   [1] Thigh, calf, or ankle swelling AND [2] only 1 side    Protocols used: ANKLE SWELLING-ADULT-AH, LEG SWELLING AND EDEMA-ADULT-AH

## 2022-05-19 NOTE — TELEPHONE ENCOUNTER
I spoke with pt and she did go to urgent care she currently has an infection in her right leg down to her ankle. Her leg was hot and red. Urgent care [place] [address] she was prescribed medication she will call back to the office with the name.

## 2022-05-31 ENCOUNTER — TELEPHONE (OUTPATIENT)
Dept: INTERNAL MEDICINE CLINIC | Age: 72
End: 2022-05-31

## 2022-05-31 ENCOUNTER — NURSE TRIAGE (OUTPATIENT)
Dept: OTHER | Facility: CLINIC | Age: 72
End: 2022-05-31

## 2022-05-31 ENCOUNTER — OFFICE VISIT (OUTPATIENT)
Dept: INTERNAL MEDICINE CLINIC | Age: 72
End: 2022-05-31
Payer: COMMERCIAL

## 2022-05-31 VITALS
HEIGHT: 70 IN | BODY MASS INDEX: 25.77 KG/M2 | TEMPERATURE: 96.9 F | OXYGEN SATURATION: 98 % | WEIGHT: 180 LBS | SYSTOLIC BLOOD PRESSURE: 128 MMHG | HEART RATE: 71 BPM | DIASTOLIC BLOOD PRESSURE: 78 MMHG

## 2022-05-31 DIAGNOSIS — R12 HEARTBURN: ICD-10-CM

## 2022-05-31 DIAGNOSIS — E78.2 MIXED HYPERLIPIDEMIA: ICD-10-CM

## 2022-05-31 DIAGNOSIS — I10 PRIMARY HYPERTENSION: ICD-10-CM

## 2022-05-31 DIAGNOSIS — E55.9 VITAMIN D DEFICIENCY: ICD-10-CM

## 2022-05-31 DIAGNOSIS — M79.89 PAIN AND SWELLING OF LOWER EXTREMITY, RIGHT: ICD-10-CM

## 2022-05-31 DIAGNOSIS — R73.03 PREDIABETES: ICD-10-CM

## 2022-05-31 DIAGNOSIS — F41.9 ANXIETY: ICD-10-CM

## 2022-05-31 DIAGNOSIS — M25.471 PAIN AND SWELLING OF RIGHT ANKLE: Primary | ICD-10-CM

## 2022-05-31 DIAGNOSIS — M79.604 PAIN AND SWELLING OF LOWER EXTREMITY, RIGHT: ICD-10-CM

## 2022-05-31 DIAGNOSIS — M25.571 PAIN AND SWELLING OF RIGHT ANKLE: Primary | ICD-10-CM

## 2022-05-31 LAB
CHP ED QC CHECK: NORMAL
GLUCOSE BLD-MCNC: 93 MG/DL
HBA1C MFR BLD: 5.7 %

## 2022-05-31 PROCEDURE — 83036 HEMOGLOBIN GLYCOSYLATED A1C: CPT | Performed by: INTERNAL MEDICINE

## 2022-05-31 PROCEDURE — 99214 OFFICE O/P EST MOD 30 MIN: CPT | Performed by: INTERNAL MEDICINE

## 2022-05-31 PROCEDURE — G8399 PT W/DXA RESULTS DOCUMENT: HCPCS | Performed by: INTERNAL MEDICINE

## 2022-05-31 PROCEDURE — G8417 CALC BMI ABV UP PARAM F/U: HCPCS | Performed by: INTERNAL MEDICINE

## 2022-05-31 PROCEDURE — G8427 DOCREV CUR MEDS BY ELIG CLIN: HCPCS | Performed by: INTERNAL MEDICINE

## 2022-05-31 PROCEDURE — 1090F PRES/ABSN URINE INCON ASSESS: CPT | Performed by: INTERNAL MEDICINE

## 2022-05-31 PROCEDURE — 1123F ACP DISCUSS/DSCN MKR DOCD: CPT | Performed by: INTERNAL MEDICINE

## 2022-05-31 PROCEDURE — 82962 GLUCOSE BLOOD TEST: CPT | Performed by: INTERNAL MEDICINE

## 2022-05-31 PROCEDURE — 1036F TOBACCO NON-USER: CPT | Performed by: INTERNAL MEDICINE

## 2022-05-31 PROCEDURE — 3017F COLORECTAL CA SCREEN DOC REV: CPT | Performed by: INTERNAL MEDICINE

## 2022-05-31 NOTE — TELEPHONE ENCOUNTER
Received call from Elijah Joel at [facility] with Red Flag Complaint. Subjective: Caller states \"Swelling in the right foot and ankle. \"     Current Symptoms:   Right leg swelling from the foot to the mid-calf  +Warmth and tender with palpation  Denies redness    Patient was seen at [facility] on 5/19 and told she had an infection of the leg and placed on abx and Prednisone. Onset: April 30th    Pain Severity: 4/10; tender    Temperature: Denies    What has been tried: Prednisone and antibiotics, elevation    Recommended disposition: Go to ED/UCC Now (Or to Office with PCP Approval)    Care advice provided, patient verbalizes understanding; denies any other questions or concerns; instructed to call back for any new or worsening symptoms. Writer provided warm transfer to Feli Daniels at [address] [place] [address] for 2nd level triage. Attention Provider: Thank you for allowing me to participate in the care of your patient. The patient was connected to triage in response to information provided to the ECC/PSC. Please do not respond through this encounter as the response is not directed to a shared pool.     Reason for Disposition   Thigh, calf, or ankle swelling in only one leg    Protocols used: LEG SWELLING AND EDEMA-ADULT-[place]

## 2022-05-31 NOTE — TELEPHONE ENCOUNTER
Patient went to Urgent Care on 5/19 was given antibiotic and prednisone and still has swelling, warm to touch and tenderness in her leg. Medication was completed, she wants to know what she should do.   Please advise

## 2022-05-31 NOTE — PATIENT INSTRUCTIONS
TAKE MED AS ADVISED    DIET/ EXERCISE. FOLLOW UP WITHIN 2-4 WEEKS / AS NEEDED    FOLLOW UP FOR LABS, X [facility], [address] Laboratory Locations - No appointment necessary. @ indicates the location is open Saturdays in addition to Monday through Friday. Call your preferred location for test preparation, business hours and other information you need. [employer] accepts [employer]. [facility]    @ [place] [facility] 3 [facility] [id number] [place] [address]. [address]   Ph: [phone] [facility] Lab Svcs. [address]   Ph: [phone]  @ [facility] Lab Svcs. [address] [facility]   Ph: [phone]    [facility] Svcs. [address]   Ph: [phone] @ [facility]. [address]  Ph: [phone] @ [facility] MOB Lab Svcs. [facility] [address] [facility]calf [facility]   Ph: [phone]    [place]   @ [place] Lab Svcs. [address] [facility] [place], [place] [address]   Ph: [phone] [facility] Bldg. [address] [facility], [address] [facility]  Ph: [address]:  [address] Lab Svcs[address] [facility]   Ph: [phone] [facility]. Lab Svcs.   [address] [facility], [address] [place] [address]   Ph: [phone]

## 2022-05-31 NOTE — PROGRESS NOTES
SUBJECTIVE:  Rudi Tlaley is a 73 y.o. female [address] Complaint   Patient presents with    Hypertension    Other     pt states that her leg is still swollen and warm to the touch        PT HERE FOR EVAL    C/O RT ANKLE PAIN AND SWELLING - PAST FEW WEEKS. ? Valentine Nguyen. PAIN SCALE 4/10. DENIES TRAUMA  C/O LEG PAIN / SWELLING - RT. PAST FEW WEEKS. ? Valentine Nguyen ? PAIN SCALE. ? REDNESS. SEEN IN URGENT CARE  - GIVEN CLINDAMYCIN, DOXYCYCLINE AND PREDNISONE - COMPLETED MEDS. NOT MUCH RELIEF  HTN - TAKING MEDS.  + DIET / EXERCISE COMPLIANCE . NO  HEADACHE , DIZZINESS No.   HLP - + DIET / EXERCISE COMPLIANCE. PREVIOUS LABS D/W PT   ANXIETY - ? MED COMPLIANCE, NO DEPRESSION.  INSOMNIA - IMPROVED. DENIES SUICIDAL / NO HOMICIDAL THOUGHTS / IDEATION   HEARTBURN -  TAKING MED. WAXES AND WANES.  VIT D DEF - TAKING MED. LABS D/W PT   + FH DM - ABN LAB DONE AT HOME     DENIES CP, No SOB, No PALPITATIONS, NO COUGH, NO F/C   No ABD PAIN, No N/V, No DIARRHEA, NO CONSTIPATION, No MELENA, No HEMATOCHEZIA. No DYSURIA, No FREQ, + URGENCY - OLD, No HEMATURIA      PMH: REVIEWED AND UPDATED TODAY    PSH: REVIEWED AND UPDATED TODAY    SOCIAL HX: REVIEWED AND UPDATED TODAY    FAMILY HX: REVIEWED AND UPDATED TODAY    ALLERGY:  Celebrex [celecoxib], Sulfa antibiotics, and Codeine    MEDS: REVIEWED  Prior to Visit Medications    Medication Sig Taking?  Authorizing Provider   irbesartan (AVAPRO) 300 MG tablet TAKE 1 TABLET BY MOUTH DAILY Yes Claudette Grief, MD   amLODIPine (NORVASC) 5 MG tablet TAKE 1 AND 1/2 TABLETS BY MOUTH DAILY Yes Claudette Grief, MD   citalopram (CELEXA) 10 MG tablet Take 1 tablet by mouth daily Yes Claudette Grief, MD   famotidine (PEPCID) 20 MG tablet TAKE 1 TABLET BY MOUTH TWICE DAILY Yes Claudette Grief, MD   Cholecalciferol (VITAMIN D3) 25 MCG (1000 UT) CAPS Take 1 capsule by mouth daily Yes Claudette Grief, MD   tiZANidine (ZANAFLEX) 4 MG tablet Take 1 tablet by mouth nightly as needed (PRN) Yes Claudette Grief, MD naproxen (NAPROSYN) 500 MG tablet Take 1 tablet by mouth 2 times daily as needed for Pain Yes Tori Sky MD   diclofenac sodium (VOLTAREN) 1 % GEL APPLY 2 GRAMS EXTERNALLY TO THE AFFECTED AREA THREE TIMES DAILY AS NEEDED FOR PAIN Yes Tori Sky MD   loratadine (CLARITIN) 10 MG tablet TAKE 1 TABLET BY MOUTH DAILY AS NEEDED Yes Tori Sky MD   fluorometholone (FML) 0.1 % ophthalmic suspension 1 drop every 4 hours Yes Historical Provider, MD   Elastic Bandages & Supports (JOBST RELIEF 30-40MMHG MEDIUM) MISC 1 Device by Does not apply route daily Yes Tori Sky MD   tolterodine (DETROL LA) 4 MG extended release capsule Take 1 capsule by mouth daily Yes Tori Sky MD   sodium chloride (YULIYA 128) 5 % ophthalmic solution Place 1 drop into the left eye every 4 hours Yes Historical Provider, MD   calcium carbonate-vitamin D (CALTRATE 600+D) 600-400 MG-UNIT TABS per tab Take 1 tablet by mouth daily Yes Tori Sky MD   Carboxymethylcellul-Glycerin (REFRESH OPTIVE) 0.5-0.9 % SOLN Apply to eye Yes Historical Provider, MD   fluticasone (FLONASE) 50 MCG/ACT nasal spray 2 sprays by Nasal route daily as needed for Rhinitis Yes Tori Sky MD       ROS: COMPREHENSIVE ROS AS IN HX, REST -VE  History obtained from chart review and the patient       OBJECTIVE:   NURSING NOTE AND VITALS REVIEWED  /80 (Site: Right Upper Arm, Position: Sitting, Cuff Size: Large Adult)   Pulse 71   Temp 96.9 °F (36.1 °C)   Ht 5' 10\" (1.778 m)   Wt 180 lb (81.6 kg)   SpO2 98%   Breastfeeding No   BMI 25.83 kg/m²     NO ACUTE DISTRESS    REPEAT BP: 128/78 (LT), NO ORTHOSTASIS     Body mass index is 25.83 kg/m².      HEENT: NO PALLOR, ANICTERIC, PERRLA, EOMI, NO CONJUNCTIVAL ERYTHEMA,                 NO SINUS TENDERNESS  NECK:  SUPPLE, TRACHEA MIDLINE, NT, NO JVD, NO CB, NO LA, NO TM, NO STIFFNESS  CHEST: RESPY EFFORT NL, GOOD AE, NO W/R/C  HEART: S1S2+ REG, NO M/G/R  ABD: SOFT, NT, NO HSM, BS+  EXT: NO EDEMA - LT, + EDEMA - RT - ANKLE TO SUP 1/3 LEG, + TENDERNESS - RT MOSTLY LOWER TO MID RT LEG, NT - LT, PULSES +. LANDY'S -VE - LT, EQUIVOCAL - RT, MINIMAL ERYTHEMA - LOWER LEG, + MINIMAL WARMTH LOWER RT LEG  NEURO: ALERT AND ORIENTED X 3, NO MENINGEAL SIGNS, NO TREMORS, NL GAIT, NO FOCAL DEFICITS  PSYCH: FAIRLY GOOD AFFECT  BACK: NT, NO ROM, NO CVA TENDERNESS     PREVIOUS LABS REVIEWED AND D/W PT    ACCUCHECK: 93    POC HBA1C: 5.7      ASSESSMENT / PLAN:     Diagnosis Orders   1. Pain and swelling of right ankle  COUNSELLED. ? OA. EXERCISES. ANALGESICS PRN. MONITOR. X RAY TO EVAL. F/U LABS. MONITOR  MAKE CHANGES AS NEEDED. 2. Pain and swelling of lower extremity, right  COUNSELLED. ADVISED LOW NA+ DIET. ELEVATE LEGS. COMPRESSION STOCKINGS. DOPPLER TO R/O DVT. MONITOR  MAKE CHANGES AS NEEDED. 3. Primary hypertension  COUNSELLED. CONTINUE MEDS. ADVISED LOW NA+ / DASH DIET/ EXERCISE. MONITOR. GOAL </= 130/80  F/U LABS  MAKE CHANGES AS NEEDED. 4. Mixed hyperlipidemia  COUNSELLED. ADVISED LOW FAT / CHOL DIET/ EXERCISE.  MONITOR. GOALS D/W PT.  MAKE CHANGES AS NEEDED. 5. Anxiety  COUNSELLED. ADVISED MED COMPLIANCE  PT COUNSELLED  ON RELAXATION TECHNIQUES. ADVISED STRESS MGT. MONITOR  PT NOT SUICIDAL/ NOT HOMICIDAL  MAKE CHANGES AS NEEDED. 6. Heartburn  COUNSELLED. CONTINUE MGT. ANTIREFLUX PRECAUTIONS ADVISED. MONITOR. MAKE CHANGES AS NEEDED. 7. Vitamin D deficiency  COUNSELLED. MONITOR ON VIT D SUPPLEMENT. MAKE CHANGES AS NEEDED. 8. Prediabetes  COUNSELLED. + FH DM. LAB D/W PT  ADVISED ON DIET / EXERCISE  ADVISED RISK FACTOR MODIFICATION. MONITOR  MAKE CHANGES AS NEEDED.                          MEDICATION SIDE EFFECTS D/W PATIENT    RETURN TO CLINIC WITHIN 2-4 WEEKS / PRN    FOLLOW UP FOR LABS, X RAY, DOPPLER

## 2022-06-01 ENCOUNTER — HOSPITAL ENCOUNTER (OUTPATIENT)
Dept: GENERAL RADIOLOGY | Age: 72
Discharge: HOME | End: 2022-06-01
Payer: COMMERCIAL

## 2022-06-01 ENCOUNTER — HOSPITAL ENCOUNTER (OUTPATIENT)
Age: 72
Discharge: HOME | End: 2022-06-01
Payer: COMMERCIAL

## 2022-06-01 ENCOUNTER — HOSPITAL ENCOUNTER (OUTPATIENT)
Dept: VASCULAR LAB | Age: 72
Discharge: HOME | End: 2022-06-01
Payer: COMMERCIAL

## 2022-06-01 DIAGNOSIS — M79.604 PAIN AND SWELLING OF LOWER EXTREMITY, RIGHT: ICD-10-CM

## 2022-06-01 DIAGNOSIS — I10 PRIMARY HYPERTENSION: ICD-10-CM

## 2022-06-01 DIAGNOSIS — M25.471 PAIN AND SWELLING OF RIGHT ANKLE: ICD-10-CM

## 2022-06-01 DIAGNOSIS — M79.89 PAIN AND SWELLING OF LOWER EXTREMITY, RIGHT: ICD-10-CM

## 2022-06-01 DIAGNOSIS — M25.571 PAIN AND SWELLING OF RIGHT ANKLE: ICD-10-CM

## 2022-06-01 DIAGNOSIS — E78.2 MIXED HYPERLIPIDEMIA: ICD-10-CM

## 2022-06-01 LAB
A/G RATIO: 2.2 (ref 1.1–2.2)
ALBUMIN SERPL-MCNC: 4.6 G/DL (ref 3.4–5)
ALP BLD-CCNC: 103 U/L (ref 40–129)
ALT SERPL-CCNC: 12 U/L (ref 10–40)
ANION GAP SERPL CALCULATED.3IONS-SCNC: 14 MMOL/L (ref 3–16)
AST SERPL-CCNC: 14 U/L (ref 15–37)
BASOPHILS ABSOLUTE: 0 K/UL (ref 0–0.2)
BASOPHILS RELATIVE PERCENT: 0.9 %
BILIRUB SERPL-MCNC: 0.6 MG/DL (ref 0–1)
BUN BLDV-MCNC: 8 MG/DL (ref 7–20)
CALCIUM SERPL-MCNC: 10.1 MG/DL (ref 8.3–10.6)
CHLORIDE BLD-SCNC: 103 MMOL/L (ref 99–110)
CO2: 25 MMOL/L (ref 21–32)
CREAT SERPL-MCNC: 0.7 MG/DL (ref 0.6–1.2)
EOSINOPHILS ABSOLUTE: 0 K/UL (ref 0–0.6)
EOSINOPHILS RELATIVE PERCENT: 0 %
GFR AFRICAN AMERICAN: >60
GFR NON-AFRICAN AMERICAN: >60
GLUCOSE BLD-MCNC: 99 MG/DL (ref 70–99)
HCT VFR BLD CALC: 39.6 % (ref 36–48)
HEMOGLOBIN: 13.1 G/DL (ref 12–16)
LYMPHOCYTES ABSOLUTE: 1.4 K/UL (ref 1–5.1)
LYMPHOCYTES RELATIVE PERCENT: 31.2 %
MCH RBC QN AUTO: 31.3 PG (ref 26–34)
MCHC RBC AUTO-ENTMCNC: 33 G/DL (ref 31–36)
MCV RBC AUTO: 94.9 FL (ref 80–100)
MONOCYTES ABSOLUTE: 0.5 K/UL (ref 0–1.3)
MONOCYTES RELATIVE PERCENT: 11.3 %
NEUTROPHILS ABSOLUTE: 2.5 K/UL (ref 1.7–7.7)
NEUTROPHILS RELATIVE PERCENT: 56.6 %
PDW BLD-RTO: 14.2 % (ref 12.4–15.4)
PLATELET # BLD: 255 K/UL (ref 135–450)
PMV BLD AUTO: 9.4 FL (ref 5–10.5)
POTASSIUM SERPL-SCNC: 5.3 MMOL/L (ref 3.5–5.1)
RBC # BLD: 4.18 M/UL (ref 4–5.2)
SODIUM BLD-SCNC: 142 MMOL/L (ref 136–145)
TOTAL PROTEIN: 6.7 G/DL (ref 6.4–8.2)
URIC ACID, SERUM: 4.2 MG/DL (ref 2.6–6)
WBC # BLD: 4.4 K/UL (ref 4–11)

## 2022-06-01 PROCEDURE — 93971 EXTREMITY STUDY: CPT

## 2022-06-01 PROCEDURE — 73610 X-RAY EXAM OF ANKLE: CPT

## 2022-06-03 ENCOUNTER — NURSE TRIAGE (OUTPATIENT)
Dept: OTHER | Facility: CLINIC | Age: 72
End: 2022-06-03

## 2022-06-03 ENCOUNTER — TELEPHONE (OUTPATIENT)
Dept: INTERNAL MEDICINE CLINIC | Age: 72
End: 2022-06-03

## 2022-06-03 NOTE — TELEPHONE ENCOUNTER
----- Message from Narendra Simeon sent at 6/3/2022 10:12 AM EDT -----  Subject: Message to Provider    QUESTIONS  Information for Provider? patient was seen by NT, did not want to continue   to hold and asked for a message to be sent over, would like something for   her leg swelling , please call patient ASAP  ---------------------------------------------------------------------------  --------------  [address]  What is the best way for the office to contact you? OK to leave message on   voicemail  Preferred Call Back Phone Number? [phone]  ---------------------------------------------------------------------------  --------------  SCRIPT ANSWERS  Relationship to Patient?  Self

## 2022-06-03 NOTE — TELEPHONE ENCOUNTER
Received call from [facility] [facility] at [facility] with [employer] Complaint. Subjective: Caller states \"right leg swelling\"     Current Symptoms: right leg swelling; Had an ultrasound and was negative for blood clots;  Still swelling and follow up appt in a couple weeks; Wants medication for swelling; Onset: a few months ago; unchanged    Associated Symptoms: NA    Pain Severity: 4/10; When she tries to wear a shoe;    Temperature: denies     What has been tried: Has had an ultrasound;    LMP: NA Pregnant: NA    Recommended disposition: See in Office Today    Care advice provided, patient verbalizes understanding; denies any other questions or concerns; instructed to call back for any new or worsening symptoms. Patient/Caller agrees with recommended disposition; writer provided warm transfer to [facility] for appointment scheduling     Attention Provider: Thank you for allowing me to participate in the care of your patient. The patient was connected to triage in response to information provided to the ECC/PSC. Please do not respond through this encounter as the response is not directed to a shared pool.       Reason for Disposition   Patient wants to be seen    Protocols used: LEG SWELLING AND EDEMA-ADULT-OH

## 2022-06-21 ENCOUNTER — OFFICE VISIT (OUTPATIENT)
Dept: INTERNAL MEDICINE CLINIC | Age: 72
End: 2022-06-21
Payer: COMMERCIAL

## 2022-06-21 VITALS
DIASTOLIC BLOOD PRESSURE: 80 MMHG | HEART RATE: 75 BPM | WEIGHT: 181 LBS | BODY MASS INDEX: 25.91 KG/M2 | TEMPERATURE: 97.1 F | OXYGEN SATURATION: 98 % | SYSTOLIC BLOOD PRESSURE: 128 MMHG | HEIGHT: 70 IN

## 2022-06-21 DIAGNOSIS — R12 HEARTBURN: ICD-10-CM

## 2022-06-21 DIAGNOSIS — F41.9 ANXIETY: ICD-10-CM

## 2022-06-21 DIAGNOSIS — R73.03 PREDIABETES: ICD-10-CM

## 2022-06-21 DIAGNOSIS — E55.9 VITAMIN D DEFICIENCY: ICD-10-CM

## 2022-06-21 DIAGNOSIS — I10 PRIMARY HYPERTENSION: Primary | ICD-10-CM

## 2022-06-21 DIAGNOSIS — E78.2 MIXED HYPERLIPIDEMIA: ICD-10-CM

## 2022-06-21 DIAGNOSIS — M79.604 PAIN AND SWELLING OF LOWER EXTREMITY, RIGHT: ICD-10-CM

## 2022-06-21 DIAGNOSIS — M79.89 PAIN AND SWELLING OF LOWER EXTREMITY, RIGHT: ICD-10-CM

## 2022-06-21 LAB
CHP ED QC CHECK: NORMAL
GLUCOSE BLD-MCNC: 95 MG/DL

## 2022-06-21 PROCEDURE — 99214 OFFICE O/P EST MOD 30 MIN: CPT | Performed by: INTERNAL MEDICINE

## 2022-06-21 PROCEDURE — 1090F PRES/ABSN URINE INCON ASSESS: CPT | Performed by: INTERNAL MEDICINE

## 2022-06-21 PROCEDURE — 1036F TOBACCO NON-USER: CPT | Performed by: INTERNAL MEDICINE

## 2022-06-21 PROCEDURE — G8427 DOCREV CUR MEDS BY ELIG CLIN: HCPCS | Performed by: INTERNAL MEDICINE

## 2022-06-21 PROCEDURE — G8417 CALC BMI ABV UP PARAM F/U: HCPCS | Performed by: INTERNAL MEDICINE

## 2022-06-21 PROCEDURE — G8399 PT W/DXA RESULTS DOCUMENT: HCPCS | Performed by: INTERNAL MEDICINE

## 2022-06-21 PROCEDURE — 3017F COLORECTAL CA SCREEN DOC REV: CPT | Performed by: INTERNAL MEDICINE

## 2022-06-21 PROCEDURE — 1123F ACP DISCUSS/DSCN MKR DOCD: CPT | Performed by: INTERNAL MEDICINE

## 2022-06-21 PROCEDURE — 82962 GLUCOSE BLOOD TEST: CPT | Performed by: INTERNAL MEDICINE

## 2022-06-21 RX ORDER — FUROSEMIDE 20 MG/1
20 TABLET ORAL DAILY PRN
Qty: 30 TABLET | Refills: 1 | Status: SHIPPED | OUTPATIENT
Start: 2022-06-21 | End: 2022-08-17 | Stop reason: SDUPTHER

## 2022-06-21 SDOH — ECONOMIC STABILITY: FOOD INSECURITY: WITHIN THE PAST 12 MONTHS, YOU WORRIED THAT YOUR FOOD WOULD RUN OUT BEFORE YOU GOT MONEY TO BUY MORE.: NEVER TRUE

## 2022-06-21 SDOH — ECONOMIC STABILITY: FOOD INSECURITY: WITHIN THE PAST 12 MONTHS, THE FOOD YOU BOUGHT JUST DIDN'T LAST AND YOU DIDN'T HAVE MONEY TO GET MORE.: NEVER TRUE

## 2022-06-21 ASSESSMENT — SOCIAL DETERMINANTS OF HEALTH (SDOH): HOW HARD IS IT FOR YOU TO PAY FOR THE VERY BASICS LIKE FOOD, HOUSING, MEDICAL CARE, AND HEATING?: NOT HARD AT ALL

## 2022-08-03 DIAGNOSIS — I10 PRIMARY HYPERTENSION: ICD-10-CM

## 2022-08-03 DIAGNOSIS — I10 ESSENTIAL HYPERTENSION: ICD-10-CM

## 2022-08-05 RX ORDER — AMLODIPINE BESYLATE 5 MG/1
TABLET ORAL
Qty: 135 TABLET | Refills: 0 | Status: SHIPPED | OUTPATIENT
Start: 2022-08-05 | End: 2022-08-17 | Stop reason: SINTOL

## 2022-08-05 RX ORDER — IRBESARTAN 300 MG/1
300 TABLET ORAL DAILY
Qty: 90 TABLET | Refills: 0 | Status: SHIPPED | OUTPATIENT
Start: 2022-08-05 | End: 2022-11-03 | Stop reason: SDUPTHER

## 2022-08-17 ENCOUNTER — OFFICE VISIT (OUTPATIENT)
Dept: INTERNAL MEDICINE CLINIC | Age: 72
End: 2022-08-17
Payer: COMMERCIAL

## 2022-08-17 VITALS
DIASTOLIC BLOOD PRESSURE: 80 MMHG | OXYGEN SATURATION: 99 % | WEIGHT: 177 LBS | HEART RATE: 68 BPM | SYSTOLIC BLOOD PRESSURE: 128 MMHG | BODY MASS INDEX: 25.4 KG/M2

## 2022-08-17 DIAGNOSIS — I10 PRIMARY HYPERTENSION: ICD-10-CM

## 2022-08-17 DIAGNOSIS — F41.9 ANXIETY: ICD-10-CM

## 2022-08-17 DIAGNOSIS — E55.9 VITAMIN D DEFICIENCY: ICD-10-CM

## 2022-08-17 DIAGNOSIS — R73.03 PREDIABETES: Primary | ICD-10-CM

## 2022-08-17 DIAGNOSIS — E78.2 MIXED HYPERLIPIDEMIA: ICD-10-CM

## 2022-08-17 DIAGNOSIS — R12 HEARTBURN: ICD-10-CM

## 2022-08-17 DIAGNOSIS — R60.0 LOCALIZED EDEMA: ICD-10-CM

## 2022-08-17 PROCEDURE — G8417 CALC BMI ABV UP PARAM F/U: HCPCS | Performed by: INTERNAL MEDICINE

## 2022-08-17 PROCEDURE — G8427 DOCREV CUR MEDS BY ELIG CLIN: HCPCS | Performed by: INTERNAL MEDICINE

## 2022-08-17 PROCEDURE — 99214 OFFICE O/P EST MOD 30 MIN: CPT | Performed by: INTERNAL MEDICINE

## 2022-08-17 PROCEDURE — 1036F TOBACCO NON-USER: CPT | Performed by: INTERNAL MEDICINE

## 2022-08-17 PROCEDURE — 3017F COLORECTAL CA SCREEN DOC REV: CPT | Performed by: INTERNAL MEDICINE

## 2022-08-17 PROCEDURE — G8399 PT W/DXA RESULTS DOCUMENT: HCPCS | Performed by: INTERNAL MEDICINE

## 2022-08-17 PROCEDURE — 1090F PRES/ABSN URINE INCON ASSESS: CPT | Performed by: INTERNAL MEDICINE

## 2022-08-17 PROCEDURE — 1123F ACP DISCUSS/DSCN MKR DOCD: CPT | Performed by: INTERNAL MEDICINE

## 2022-08-17 RX ORDER — DOXAZOSIN MESYLATE 4 MG/1
4 TABLET ORAL NIGHTLY
Qty: 90 TABLET | Refills: 0 | Status: SHIPPED | OUTPATIENT
Start: 2022-08-17 | End: 2022-11-03 | Stop reason: SINTOL

## 2022-08-17 RX ORDER — FUROSEMIDE 20 MG/1
20 TABLET ORAL DAILY PRN
Qty: 30 TABLET | Refills: 1 | Status: SHIPPED | OUTPATIENT
Start: 2022-08-17 | End: 2022-10-28

## 2022-08-17 NOTE — PROGRESS NOTES
SUBJECTIVE:  Fenton Snellen is a 73 y.o. female [address]   Chief Complaint   Patient presents with    Follow-up      PT HERE FOR EVAL     PREDIABETES - DIET / EXERCISE REVIEWED. + FH DM  HTN - TAKING MEDS.  + DIET / EXERCISE COMPLIANCE . NO  HEADACHE , DIZZINESS No.   FOOT AND  LEG PAIN / SWELLING - RT > LT. PERSISTENT. ? Collene Lipoma ? PAIN SCALE. NO REDNESS. MED HELPING SOME  HLP - + DIET / EXERCISE COMPLIANCE. PREVIOUS LABS D/W PT   ANXIETY - TAKING MED, NO DEPRESSION. INSOMNIA - IMPROVED. DENIES SUICIDAL / NO HOMICIDAL THOUGHTS / IDEATION  HEARTBURN -  INTERMITTENT. TAKING MED PRN. VIT D DEF - TAKING MED. LABS D/W PT      DENIES CP, No SOB, No PALPITATIONS, NO COUGH, NO F/C  No ABD PAIN, No N/V, No DIARRHEA, NO CONSTIPATION, No MELENA, No HEMATOCHEZIA. No DYSURIA, No FREQ, + URGENCY - OLD, No HEMATURIA    PMH: REVIEWED AND UPDATED TODAY    PSH: REVIEWED AND UPDATED TODAY    SOCIAL HX: REVIEWED AND UPDATED TODAY    FAMILY HX: REVIEWED AND UPDATED TODAY    ALLERGY:  Celebrex [celecoxib], Sulfa antibiotics, and Codeine    MEDS: REVIEWED  Prior to Visit Medications    Medication Sig Taking?  Authorizing Provider   amLODIPine (NORVASC) 5 MG tablet TAKE 1 AND 1/2 TABLETS BY MOUTH DAILY Yes Chyna Dewitt MD   irbesartan (AVAPRO) 300 MG tablet TAKE 1 TABLET BY MOUTH DAILY Yes Chyna Dewitt MD   furosemide (LASIX) 20 MG tablet Take 1 tablet by mouth daily as needed (SWELLING) Yes Chyna Dewitt MD   Cholecalciferol (VITAMIN D3) 25 MCG (1000 UT) CAPS Take 1 capsule by mouth daily Yes Chyna Dewitt MD   citalopram (CELEXA) 10 MG tablet Take 1 tablet by mouth daily Yes Chyna Dewitt MD   famotidine (PEPCID) 20 MG tablet TAKE 1 TABLET BY MOUTH TWICE DAILY Yes Chyna Dewitt MD   tiZANidine (ZANAFLEX) 4 MG tablet Take 1 tablet by mouth nightly as needed (PRN) Yes Chyna Dewitt MD   naproxen (NAPROSYN) 500 MG tablet Take 1 tablet by mouth 2 times daily as needed for Pain Yes Chyna Dewitt MD   diclofenac sodium (VOLTAREN) 1 % GEL APPLY 2 GRAMS EXTERNALLY TO THE AFFECTED AREA THREE TIMES DAILY AS NEEDED FOR PAIN Yes Guillermina Mike MD   loratadine (CLARITIN) 10 MG tablet TAKE 1 TABLET BY MOUTH DAILY AS NEEDED Yes Guillermina Mike MD   fluorometholone (FML) 0.1 % ophthalmic suspension 1 drop every 4 hours Yes Historical Provider, MD   Elastic Bandages & Supports (JOBST RELIEF 30-40MMHG MEDIUM) MISC 1 Device by Does not apply route daily Yes Guillermina Mike MD   tolterodine (DETROL LA) 4 MG extended release capsule Take 1 capsule by mouth daily Yes Guillermina Mike MD   sodium chloride (YULIYA 128) 5 % ophthalmic solution Place 1 drop into the left eye every 4 hours Yes Historical Provider, MD   calcium carbonate-vitamin D (CALTRATE 600+D) 600-400 MG-UNIT TABS per tab Take 1 tablet by mouth daily Yes Guillermina Mike MD   Carboxymethylcellul-Glycerin 0.5-0.9 % SOLN Apply to eye Yes Historical Provider, MD   fluticasone (FLONASE) 50 MCG/ACT nasal spray 2 sprays by Nasal route daily as needed for Rhinitis Yes Guillermina Mike MD       ROS: COMPREHENSIVE ROS AS IN HX, REST -VE  History obtained from chart review and the patient       OBJECTIVE:   NURSING NOTE AND VITALS REVIEWED  /78 (Site: Right Upper Arm, Position: Sitting, Cuff Size: Large Adult)   Pulse 68   Wt 177 lb (80.3 kg)   SpO2 99%   BMI 25.40 kg/m²     NO ACUTE DISTRESS    REPEAT BP: 128/80 (RT), NO ORTHOSTASIS     Body mass index is 25.4 kg/m². HEENT: NO PALLOR, ANICTERIC, PERRLA, EOMI, NO CONJUNCTIVAL ERYTHEMA,                 NO SINUS TENDERNESS  NECK:  SUPPLE, TRACHEA MIDLINE, NT, NO JVD, NO CB, NO LA, NO TM, NO STIFFNESS  CHEST: RESPY EFFORT NL, GOOD AE, NO W/R/C  HEART: S1S2+ REG, NO M/G/R  ABD: SOFT, NT, NO HSM, BS+  EXT: + EDEMA - DOMINICK, NT, PULSES +.  LANDY'S -VE  NEURO: ALERT AND ORIENTED X 3, NO MENINGEAL SIGNS, NO TREMORS, NL GAIT, NO FOCAL DEFICITS  PSYCH: FAIRLY GOOD AFFECT  BACK: NT, NO ROM, NO CVA TENDERNESS     PREVIOUS LABS REVIEWED AND D/W PT    ASSESSMENT / PLAN:     Diagnosis Orders   1. Prediabetes  COUNSELLED. ADVISED ON DIET / EXERCISE  ADVISED RISK FACTOR MODIFICATION. MONITOR  MAKE CHANGES AS NEEDED. 2. Primary hypertension  COUNSELLED. CHANGE TO CARDURA 4 MG QHS (XD/C NORVASC - S/E)  CONTINUE AVAPRO  ADVISED LOW NA+ / DASH DIET/ EXERCISE. MONITOR. GOAL </= 130/80  F/U LABS   MAKE CHANGES AS NEEDED. 3. Localized edema  COUNSELLED. ADVISED LOW NA+ DIET. ELEVATE LEGS. COMPRESSION STOCKINGS. MONITOR OFF NORVASC. LASIX PRN  MAKE CHANGES AS NEEDED. 4. Mixed hyperlipidemia  COUNSELLED. ADVISED LOW FAT / CHOL DIET/ EXERCISE.  MONITOR. F/U LABS  GOALS D/W PT.  MAKE CHANGES AS NEEDED. 5. Anxiety  COUNSELLED. CONTINUE MED  PT COUNSELLED  ON RELAXATION TECHNIQUES. ADDRESSED STRESS MGT. MONITOR  PT NOT SUICIDAL/ NOT HOMICIDAL  MAKE CHANGES AS NEEDED. 6. Heartburn  COUNSELLED. CONTINUE MGT. ANTIREFLUX PRECAUTIONS ADVISED. MONITOR. MAKE CHANGES AS NEEDED. 7. Vitamin D deficiency  COUNSELLED. MONITOR ON VIT D SUPPLEMENT. MAKE CHANGES AS NEEDED.                          MEDICATION SIDE EFFECTS D/W PATIENT    RETURN TO CLINIC WITHIN  3 MONTHS / PRN    FOLLOW UP FOR FASTING LABS

## 2022-08-17 NOTE — PATIENT INSTRUCTIONS
TAKE MED AS ADVISED    DIET/ EXERCISE. FOLLOW UP WITHIN 3 MONTHS / AS NEEDED    FOLLOW UP FOR FASTING [facility] Laboratory Locations - No appointment necessary. @ indicates the location is open Saturdays in addition to Monday through Friday. Call your preferred location for test preparation, business hours and other information you need. [employer] accepts [employer]. [facility]    @ [place] Lab Svcs. [address] [place], [address]   Ph: [phone] [facility] Lab Svcs. [address]   Ph: [phone]  @ [place] [address] Lab Svcs. [address]   Ph: [phone]    [facility] Lab Svcs. [address]   Ph: [phone] @ [place] Lab Svcs. [address]  Ph: [phone] @ [place] MOB Lab Svcs. [address] [facility] [address]   Ph: [phone]     [facility] Svcs. [facility] [place] [address]  Ph: [phone]    [place]   @ [place] Lab Svcs. [address] [place], [place] [address]   Ph: [phone] [place] Med. Office Bldg. [address] Cir[address] Reyes, [address] [facility]  Ph: [address] 95, [address]   [place] [address] Lab Svcs. 54 [facility]   Ph: [phone] [facility]. Lab Svcs.   211 [facility], [address] [facility]   Ph: [phone]

## 2022-10-23 DIAGNOSIS — R60.0 LOCALIZED EDEMA: ICD-10-CM

## 2022-10-24 DIAGNOSIS — R60.0 LOCALIZED EDEMA: ICD-10-CM

## 2022-10-24 DIAGNOSIS — E78.2 MIXED HYPERLIPIDEMIA: ICD-10-CM

## 2022-10-24 DIAGNOSIS — R73.03 PREDIABETES: ICD-10-CM

## 2022-10-24 DIAGNOSIS — F41.9 ANXIETY: ICD-10-CM

## 2022-10-24 DIAGNOSIS — E55.9 VITAMIN D DEFICIENCY: ICD-10-CM

## 2022-10-24 DIAGNOSIS — I10 PRIMARY HYPERTENSION: ICD-10-CM

## 2022-10-24 LAB
A/G RATIO: 2.2 (ref 1.1–2.2)
ALBUMIN SERPL-MCNC: 4.6 G/DL (ref 3.4–5)
ALP BLD-CCNC: 114 U/L (ref 40–129)
ALT SERPL-CCNC: 12 U/L (ref 10–40)
ANION GAP SERPL CALCULATED.3IONS-SCNC: 9 MMOL/L (ref 3–16)
AST SERPL-CCNC: 16 U/L (ref 15–37)
BILIRUB SERPL-MCNC: 0.5 MG/DL (ref 0–1)
BUN BLDV-MCNC: 11 MG/DL (ref 7–20)
CALCIUM SERPL-MCNC: 9.8 MG/DL (ref 8.3–10.6)
CHLORIDE BLD-SCNC: 103 MMOL/L (ref 99–110)
CHOLESTEROL, TOTAL: 185 MG/DL (ref 0–199)
CO2: 29 MMOL/L (ref 21–32)
CREAT SERPL-MCNC: 0.7 MG/DL (ref 0.6–1.2)
CREATININE URINE: 72.8 MG/DL (ref 28–259)
GFR SERPL CREATININE-BSD FRML MDRD: >60 ML/MIN/{1.73_M2}
GLUCOSE BLD-MCNC: 95 MG/DL (ref 70–99)
HDLC SERPL-MCNC: 56 MG/DL (ref 40–60)
LDL CHOLESTEROL CALCULATED: 115 MG/DL
MICROALBUMIN UR-MCNC: <1.2 MG/DL
MICROALBUMIN/CREAT UR-RTO: NORMAL MG/G (ref 0–30)
POTASSIUM SERPL-SCNC: 4.9 MMOL/L (ref 3.5–5.1)
SODIUM BLD-SCNC: 141 MMOL/L (ref 136–145)
TOTAL PROTEIN: 6.7 G/DL (ref 6.4–8.2)
TRIGL SERPL-MCNC: 72 MG/DL (ref 0–150)
TSH REFLEX: 0.53 UIU/ML (ref 0.27–4.2)
VITAMIN D 25-HYDROXY: 66.8 NG/ML
VLDLC SERPL CALC-MCNC: 14 MG/DL

## 2022-10-24 NOTE — TELEPHONE ENCOUNTER
Medication:   Requested Prescriptions     Pending Prescriptions Disp Refills    furosemide (LASIX) 20 MG tablet [Pharmacy Med Name: FUROSEMIDE 20MG TABLETS] 30 tablet 1     Sig: TAKE 1 TABLET BY MOUTH DAILY AS NEEDED FOR SWELLING        Last Filled: 08/17/22    Patient Phone Number: [phone] (home)     Last appt: 8/17/2022   Next appt: 11/3/2022

## 2022-10-28 ENCOUNTER — TELEPHONE (OUTPATIENT)
Dept: INTERNAL MEDICINE CLINIC | Age: 72
End: 2022-10-28

## 2022-10-28 RX ORDER — FUROSEMIDE 20 MG/1
TABLET ORAL
Qty: 30 TABLET | Refills: 1 | Status: SHIPPED | OUTPATIENT
Start: 2022-10-28

## 2022-11-03 ENCOUNTER — OFFICE VISIT (OUTPATIENT)
Dept: INTERNAL MEDICINE CLINIC | Age: 72
End: 2022-11-03
Payer: COMMERCIAL

## 2022-11-03 VITALS
BODY MASS INDEX: 25.68 KG/M2 | WEIGHT: 179 LBS | OXYGEN SATURATION: 99 % | DIASTOLIC BLOOD PRESSURE: 80 MMHG | SYSTOLIC BLOOD PRESSURE: 130 MMHG | HEART RATE: 68 BPM

## 2022-11-03 DIAGNOSIS — F41.9 ANXIETY: ICD-10-CM

## 2022-11-03 DIAGNOSIS — E78.2 MIXED HYPERLIPIDEMIA: ICD-10-CM

## 2022-11-03 DIAGNOSIS — E55.9 VITAMIN D DEFICIENCY: ICD-10-CM

## 2022-11-03 DIAGNOSIS — R73.03 PREDIABETES: ICD-10-CM

## 2022-11-03 DIAGNOSIS — R60.0 LOCALIZED EDEMA: ICD-10-CM

## 2022-11-03 DIAGNOSIS — I10 PRIMARY HYPERTENSION: ICD-10-CM

## 2022-11-03 DIAGNOSIS — R12 HEARTBURN: ICD-10-CM

## 2022-11-03 DIAGNOSIS — K62.5 RECTAL BLEED: Primary | ICD-10-CM

## 2022-11-03 LAB
CHP ED QC CHECK: NORMAL
GLUCOSE BLD-MCNC: 94 MG/DL
HBA1C MFR BLD: 5.5 %

## 2022-11-03 PROCEDURE — 1036F TOBACCO NON-USER: CPT | Performed by: INTERNAL MEDICINE

## 2022-11-03 PROCEDURE — 3017F COLORECTAL CA SCREEN DOC REV: CPT | Performed by: INTERNAL MEDICINE

## 2022-11-03 PROCEDURE — 99214 OFFICE O/P EST MOD 30 MIN: CPT | Performed by: INTERNAL MEDICINE

## 2022-11-03 PROCEDURE — 3078F DIAST BP <80 MM HG: CPT | Performed by: INTERNAL MEDICINE

## 2022-11-03 PROCEDURE — G8417 CALC BMI ABV UP PARAM F/U: HCPCS | Performed by: INTERNAL MEDICINE

## 2022-11-03 PROCEDURE — 1123F ACP DISCUSS/DSCN MKR DOCD: CPT | Performed by: INTERNAL MEDICINE

## 2022-11-03 PROCEDURE — 83036 HEMOGLOBIN GLYCOSYLATED A1C: CPT | Performed by: INTERNAL MEDICINE

## 2022-11-03 PROCEDURE — G8427 DOCREV CUR MEDS BY ELIG CLIN: HCPCS | Performed by: INTERNAL MEDICINE

## 2022-11-03 PROCEDURE — G8484 FLU IMMUNIZE NO ADMIN: HCPCS | Performed by: INTERNAL MEDICINE

## 2022-11-03 PROCEDURE — 3074F SYST BP LT 130 MM HG: CPT | Performed by: INTERNAL MEDICINE

## 2022-11-03 PROCEDURE — 82962 GLUCOSE BLOOD TEST: CPT | Performed by: INTERNAL MEDICINE

## 2022-11-03 PROCEDURE — G8399 PT W/DXA RESULTS DOCUMENT: HCPCS | Performed by: INTERNAL MEDICINE

## 2022-11-03 PROCEDURE — 1090F PRES/ABSN URINE INCON ASSESS: CPT | Performed by: INTERNAL MEDICINE

## 2022-11-03 RX ORDER — LORATADINE 10 MG/1
TABLET ORAL
Qty: 30 TABLET | Refills: 3 | Status: SHIPPED | OUTPATIENT
Start: 2022-11-03

## 2022-11-03 RX ORDER — IRBESARTAN 300 MG/1
300 TABLET ORAL DAILY
Qty: 90 TABLET | Refills: 0 | Status: SHIPPED | OUTPATIENT
Start: 2022-11-03

## 2022-11-03 RX ORDER — CLONIDINE HYDROCHLORIDE 0.2 MG/1
0.2 TABLET ORAL DAILY
Qty: 90 TABLET | Refills: 0 | Status: SHIPPED | OUTPATIENT
Start: 2022-11-03

## 2022-11-03 RX ORDER — PANTOPRAZOLE SODIUM 20 MG/1
20 TABLET, DELAYED RELEASE ORAL DAILY
Qty: 30 TABLET | Refills: 3 | Status: SHIPPED | OUTPATIENT
Start: 2022-11-03

## 2022-11-03 NOTE — PATIENT INSTRUCTIONS
TAKE MED AS ADVISED    DIET/ EXERCISE. FOLLOW UP WITHIN 6 WEEKS / AS NEEDED    FOLLOW UP FOR LAB, GI      [facility] Laboratory Locations - No appointment necessary. @ indicates the location is open Saturdays in addition to Monday through Friday. Call your preferred location for test preparation, business hours and other information you need. [employer] accepts [employer]. [facility]    @ [place] Lab Svcs. 3 [facility] [id number] [place] [address]. [place], [address]   Ph: [phone] [facility] Lab Svcs. [address]   Ph: [phone]  @ [place] Lab Svcs. 3155 [facility]   [facility]   Ph: [phone]    [facility] Lab Svcs. [address]   Ph: [phone] @ [place] Lab Svcs. [address]  Ph: [phone] @ [facility] Lab Svcs. [address] [facility] [address]   Ph: [phone]     [facility] Svcs. [place] [address]  Ph: [phone]    [place]   @ [place] Lab Svcs. [address] [place], [place] [address]   Ph: [phone] [facility] Med. Office Bldg. 3280 Cir[address] [facility], 800 [facility]  Ph: [address] [place] [address]   [place] [address] Lab Svcs. 54 [facility]   Ph: [phone] [facility]. Lab Svcs.   211 [facility], [facility]   Ph: [phone]

## 2022-11-03 NOTE — PROGRESS NOTES
SUBJECTIVE:  Dylan Machado is a 81 y.o. female [address] Complaint   Patient presents with    Follow-up    Hypertension        PT HERE FOR EVAL     C/O RECTAL BLEED - INTERMITTENT PAST 1+ MONTH. SNALL AMOUNT. ? RECTAL IRRITATION. STATES NOTED SINCE TAKING CARDURA. LAST C SCOPE 1/22  HTN - TAKING MEDS.  + DIET / EXERCISE COMPLIANCE . NO  HEADACHE , DIZZINESS No.   HLP - + DIET / EXERCISE COMPLIANCE. PREVIOUS LABS D/W PT   PREDIABETES - DIET / EXERCISE REVIEWED. + FH DM  ANXIETY - TAKING MED, NO DEPRESSION. INSOMNIA - IMPROVED. DENIES SUICIDAL / NO HOMICIDAL THOUGHTS / IDEATION  HEARTBURN -  WAXES AND WANES. TAKING MED. ? NO BELCHING  VIT D DEF - TAKING MED. LABS D/W PT   FOOT AND  LEG PAIN / SWELLING - RT > LT. IMPROVING. ? Tomer Carlson ? PAIN SCALE. NO REDNESS. DENIES CP, No SOB, No PALPITATIONS, NO COUGH, NO F/C  No ABD PAIN, No N/V, No DIARRHEA, NO CONSTIPATION, No MELENA, + HEMATOCHEZIA. No DYSURIA, No FREQ, + URGENCY - OLD, No HEMATURIA         PMH: REVIEWED AND UPDATED TODAY    PSH: REVIEWED AND UPDATED TODAY    SOCIAL HX: REVIEWED AND UPDATED TODAY    FAMILY HX: REVIEWED AND UPDATED TODAY    ALLERGY:  Celebrex [celecoxib], Sulfa antibiotics, and Codeine    MEDS: REVIEWED  Prior to Visit Medications    Medication Sig Taking?  Authorizing Provider   furosemide (LASIX) 20 MG tablet TAKE 1 TABLET BY MOUTH DAILY AS NEEDED FOR SWELLING Yes Cookie Chaudhry MD   doxazosin (CARDURA) 4 MG tablet Take 1 tablet by mouth nightly Yes Cookie Chaudhry MD   irbesartan (AVAPRO) 300 MG tablet TAKE 1 TABLET BY MOUTH DAILY Yes Cookie Chaudhry MD   Cholecalciferol (VITAMIN D3) 25 MCG (1000 UT) CAPS Take 1 capsule by mouth daily Yes Cookie Chaudhry MD   citalopram (CELEXA) 10 MG tablet Take 1 tablet by mouth daily Yes Cookie Chaudhry MD   naproxen (NAPROSYN) 500 MG tablet Take 1 tablet by mouth 2 times daily as needed for Pain Yes Cookie Chaudhry MD   diclofenac sodium (VOLTAREN) 1 % GEL APPLY 2 GRAMS EXTERNALLY TO THE AFFECTED AREA THREE TIMES DAILY AS NEEDED FOR PAIN Yes Evon Denny MD   loratadine (CLARITIN) 10 MG tablet TAKE 1 TABLET BY MOUTH DAILY AS NEEDED Yes Evon Denny MD   calcium carbonate-vitamin D (CALTRATE 600+D) 600-400 MG-UNIT TABS per tab Take 1 tablet by mouth daily Yes Evon Denny MD   famotidine (PEPCID) 20 MG tablet TAKE 1 TABLET BY MOUTH TWICE DAILY  Patient not taking: Reported on 11/3/2022  Evon Denny MD   tiZANidine (ZANAFLEX) 4 MG tablet Take 1 tablet by mouth nightly as needed (PRN)  Patient not taking: Reported on 11/3/2022  Evon Denny MD   fluorometholone (FML) 0.1 % ophthalmic suspension 1 drop every 4 hours  Patient not taking: Reported on 11/3/2022  Historical Provider, MD   Elastic Bandages & Supports ([address] 30-40MMHG MEDIUM) MISC 1 Device by Does not apply route daily  Patient not taking: Reported on 11/3/2022  Evon Denny MD   tolterodine (DETROL LA) 4 MG extended release capsule Take 1 capsule by mouth daily  Patient not taking: Reported on 11/3/2022  Evon Denny MD   sodium chloride (YULIYA 128) 5 % ophthalmic solution Place 1 drop into the left eye every 4 hours  Patient not taking: Reported on 11/3/2022  Historical Provider, MD   Carboxymethylcellul-Glycerin 0.5-0.9 % SOLN Apply to eye  Patient not taking: Reported on 11/3/2022  Historical Provider, MD   fluticasone (FLONASE) 50 MCG/ACT nasal spray 2 sprays by Nasal route daily as needed for Rhinitis  Patient not taking: Reported on 11/3/2022  Evon Denny MD       ROS: COMPREHENSIVE ROS AS IN HX, REST -VE  History obtained from chart review and the patient       OBJECTIVE:   NURSING NOTE AND VITALS REVIEWED  /82 (Site: Left Upper Arm, Position: Sitting, Cuff Size: Large Adult)   Pulse 68   Wt 179 lb (81.2 kg)   SpO2 99%   BMI 25.68 kg/m²     NO ACUTE DISTRESS    REPEAT BP:  130/80 (LT),  NO ORTHOSTASIS     Body mass index is 25.68 kg/m².      HEENT: NO PALLOR, ANICTERIC, PERRLA, EOMI, NO CONJUNCTIVAL ERYTHEMA,                 NO SINUS TENDERNESS  NECK:  SUPPLE, TRACHEA MIDLINE, NT, NO JVD, NO CB, NO LA, NO TM, NO STIFFNESS  CHEST: RESPY EFFORT NL, GOOD AE, NO W/R/C  HEART: S1S2+ REG, NO M/G/R  ABD: SOFT, NT, NO HSM, BS+  EXT: TRACE EDEMA, NT, PULSES +. LANDY'S -VE  NEURO: ALERT AND ORIENTED X 3, NO MENINGEAL SIGNS, NO TREMORS, NL GAIT, NO FOCAL DEFICITS  PSYCH: FAIRLY GOOD AFFECT  BACK: NT, NO ROM, NO CVA TENDERNESS  FOOT: MID SWELLING, NT, NO ROM  RECTAL:  DEFERRED      PREVIOUS LABS REVIEWED AND D/W PT    ACCUCHECK: 94    POC HBA1C: 5.5    ASSESSMENT / PLAN:     Diagnosis Orders   1. Rectal bleed  COUNSELLED. PT HEMODYNAMICALLY STABLE  MONITOR LABS  ADVISED F/U WITH GI  F/U IF PERSISTENT  IF WORSENING - GO TO ER  MAKE CHANGES AS NEEDED. 2. Primary hypertension  COUNSELLED. D/C CARDURA - ? S/E  CHANGE TO CLONIDINE 0.2 MG DAILY  CONTINUE AVAPRO  ADVISED LOW NA+ / DASH DIET/ EXERCISE. MONITOR. GOAL </= 130/80  MAKE CHANGES AS NEEDED. 3. Mixed hyperlipidemia  COUNSELLED. DEFERRED MED  ADVISED LOW FAT / CHOL DIET/ EXERCISE.  MONITOR. GOALS D/W PT.  MAKE CHANGES AS NEEDED. 4. Prediabetes  COUNSELLED. STABLE. ADVISED ON DIET / EXERCISE  ADVISED RISK FACTOR MODIFICATION. MONITOR  MAKE CHANGES AS NEEDED. 5. Anxiety  COUNSELLED. ADVISED MED  PT COUNSELLED  ON RELAXATION TECHNIQUES. ADDRESSED STRESS MGT. MONITOR  PT NOT SUICIDAL/ NOT HOMICIDAL  MAKE CHANGES AS NEEDED. 6. Heartburn  COUNSELLED. CHANGE TO PROTONIX 20 MG DAILY - S/E D/W PT  ANTIREFLUX PRECAUTIONS ADVISED. MONITOR. MAKE CHANGES AS NEEDED. 7. Vitamin D deficiency  COUNSELLED. MONITOR ON VIT D SUPPLEMENT. MAKE CHANGES AS NEEDED. 8. Localized edema  COUNSELLED. ADVISED LOW NA+ DIET. ELEVATE LEGS. COMPRESSION STOCKINGS. LASIX PRN. MONITOR  MAKE CHANGES AS NEEDED.                 PT DECLINED INFLUENZA VACCINE           MEDICATION SIDE EFFECTS D/W PATIENT    RETURN TO CLINIC WITHIN 6 WEEKS / PRN    FOLLOW UP FOR LAB, GI

## 2022-11-04 DIAGNOSIS — K62.5 RECTAL BLEED: ICD-10-CM

## 2022-11-04 LAB
BASOPHILS ABSOLUTE: 0 K/UL (ref 0–0.2)
BASOPHILS RELATIVE PERCENT: 0.5 %
EOSINOPHILS ABSOLUTE: 0 K/UL (ref 0–0.6)
EOSINOPHILS RELATIVE PERCENT: 0 %
HCT VFR BLD CALC: 40.1 % (ref 36–48)
HEMOGLOBIN: 13.1 G/DL (ref 12–16)
LYMPHOCYTES ABSOLUTE: 1.3 K/UL (ref 1–5.1)
LYMPHOCYTES RELATIVE PERCENT: 32 %
MCH RBC QN AUTO: 31 PG (ref 26–34)
MCHC RBC AUTO-ENTMCNC: 32.8 G/DL (ref 31–36)
MCV RBC AUTO: 94.7 FL (ref 80–100)
MONOCYTES ABSOLUTE: 0.4 K/UL (ref 0–1.3)
MONOCYTES RELATIVE PERCENT: 10.1 %
NEUTROPHILS ABSOLUTE: 2.2 K/UL (ref 1.7–7.7)
NEUTROPHILS RELATIVE PERCENT: 57.4 %
PDW BLD-RTO: 13.9 % (ref 12.4–15.4)
PLATELET # BLD: 225 K/UL (ref 135–450)
PMV BLD AUTO: 10.2 FL (ref 5–10.5)
RBC # BLD: 4.23 M/UL (ref 4–5.2)
WBC # BLD: 3.9 K/UL (ref 4–11)

## 2022-12-24 DIAGNOSIS — R60.0 LOCALIZED EDEMA: ICD-10-CM

## 2022-12-27 NOTE — TELEPHONE ENCOUNTER
Medication:   Requested Prescriptions     Pending Prescriptions Disp Refills    furosemide (LASIX) 20 MG tablet [Pharmacy Med Name: FUROSEMIDE 20MG TABLETS] 30 tablet 1     Sig: TAKE 1 TABLET BY MOUTH DAILY AS NEEDED FOR SWELLING        Last Filled:  11/20/2022    Patient Phone Number: [phone] (home)     Last appt: 11/3/2022   Next appt: 1/6/2023    Last OARRS: No flowsheet data found.

## 2022-12-30 ENCOUNTER — TELEPHONE (OUTPATIENT)
Dept: INTERNAL MEDICINE CLINIC | Age: 72
End: 2022-12-30

## 2022-12-30 RX ORDER — FUROSEMIDE 20 MG/1
TABLET ORAL
Qty: 30 TABLET | Refills: 1 | Status: SHIPPED | OUTPATIENT
Start: 2022-12-30

## 2023-01-06 ENCOUNTER — OFFICE VISIT (OUTPATIENT)
Dept: INTERNAL MEDICINE CLINIC | Age: 73
End: 2023-01-06

## 2023-01-06 VITALS
BODY MASS INDEX: 25.68 KG/M2 | TEMPERATURE: 98 F | WEIGHT: 179.4 LBS | HEART RATE: 68 BPM | OXYGEN SATURATION: 99 % | HEIGHT: 70 IN | SYSTOLIC BLOOD PRESSURE: 124 MMHG | DIASTOLIC BLOOD PRESSURE: 78 MMHG

## 2023-01-06 DIAGNOSIS — I10 PRIMARY HYPERTENSION: Primary | ICD-10-CM

## 2023-01-06 DIAGNOSIS — K62.5 RECTAL BLEED: ICD-10-CM

## 2023-01-06 DIAGNOSIS — F41.9 ANXIETY: ICD-10-CM

## 2023-01-06 DIAGNOSIS — R12 HEARTBURN: ICD-10-CM

## 2023-01-06 DIAGNOSIS — R73.03 PREDIABETES: ICD-10-CM

## 2023-01-06 DIAGNOSIS — E78.2 MIXED HYPERLIPIDEMIA: ICD-10-CM

## 2023-01-06 DIAGNOSIS — E55.9 VITAMIN D DEFICIENCY: ICD-10-CM

## 2023-01-06 LAB
CHP ED QC CHECK: NORMAL
GLUCOSE BLD-MCNC: 101 MG/DL

## 2023-01-06 RX ORDER — LORATADINE 10 MG/1
TABLET ORAL
Qty: 30 TABLET | Refills: 3 | Status: SHIPPED | OUTPATIENT
Start: 2023-01-06

## 2023-01-06 RX ORDER — CLONIDINE HYDROCHLORIDE 0.2 MG/1
0.2 TABLET ORAL DAILY
Qty: 90 TABLET | Refills: 0 | Status: SHIPPED | OUTPATIENT
Start: 2023-01-06

## 2023-01-06 RX ORDER — IRBESARTAN 300 MG/1
300 TABLET ORAL DAILY
Qty: 90 TABLET | Refills: 0 | Status: SHIPPED | OUTPATIENT
Start: 2023-01-06

## 2023-01-06 RX ORDER — PANTOPRAZOLE SODIUM 20 MG/1
20 TABLET, DELAYED RELEASE ORAL DAILY
Qty: 30 TABLET | Refills: 3 | Status: SHIPPED | OUTPATIENT
Start: 2023-01-06

## 2023-01-06 ASSESSMENT — PATIENT HEALTH QUESTIONNAIRE - PHQ9
SUM OF ALL RESPONSES TO PHQ QUESTIONS 1-9: 0
SUM OF ALL RESPONSES TO PHQ QUESTIONS 1-9: 0
2. FEELING DOWN, DEPRESSED OR HOPELESS: 0
1. LITTLE INTEREST OR PLEASURE IN DOING THINGS: 0
SUM OF ALL RESPONSES TO PHQ QUESTIONS 1-9: 0
SUM OF ALL RESPONSES TO PHQ QUESTIONS 1-9: 0
SUM OF ALL RESPONSES TO PHQ9 QUESTIONS 1 & 2: 0

## 2023-01-06 NOTE — PROGRESS NOTES
SUBJECTIVE:  Carolyn Calderon is a 81 y.o. female [address] [facility] Complaint   Patient presents with    Follow-up    Hypertension        PT HERE FOR EVAL       HTN - TAKING MEDS - TOLERATING.  + DIET / EXERCISE COMPLIANCE . NO  HEADACHE , DIZZINESS No.   HLP - + DIET / EXERCISE COMPLIANCE. PREVIOUS LABS D/W PT   PREDIABETES - DIET / EXERCISE REVIEWED. + FH DM  ANXIETY - TAKING MED. NO DEPRESSION. INSOMNIA - IMPROVED. DENIES SUICIDAL / NO HOMICIDAL THOUGHTS / IDEATION  HEARTBURN -  WAXES AND WANES. TAKING MED - HELPING SOME. ? NO BELCHING  VIT D DEF - TAKING MED. LABS D/W PT   RECTAL BLEED - RESOLVED. HAS NOT FOLLOWED WITH GI    DENIES CP, No SOB, No PALPITATIONS, NO COUGH, NO F/C  No ABD PAIN, No N/V, No DIARRHEA, NO CONSTIPATION, No MELENA, HEMATOCHEZIA - RESOLVED. No DYSURIA, No FREQ, + URGENCY - OLD, No HEMATURIA      PMH: REVIEWED AND UPDATED TODAY    PSH: REVIEWED AND UPDATED TODAY    SOCIAL HX: REVIEWED AND UPDATED TODAY    FAMILY HX: REVIEWED AND UPDATED TODAY    ALLERGY:  Celebrex [celecoxib], Sulfa antibiotics, and Codeine    MEDS: REVIEWED  Prior to Visit Medications    Medication Sig Taking?  Authorizing Provider   furosemide (LASIX) 20 MG tablet TAKE 1 TABLET BY MOUTH DAILY AS NEEDED FOR SWELLING Yes Chidi Gaspar MD   irbesartan (AVAPRO) 300 MG tablet Take 1 tablet by mouth daily Yes Chidi Gaspar MD   cloNIDine (CATAPRES) 0.2 MG tablet Take 1 tablet by mouth daily Yes Chidi Gaspar MD   loratadine (CLARITIN) 10 MG tablet TAKE 1 TABLET BY MOUTH DAILY AS NEEDED Yes Chidi Gaspar MD   pantoprazole (PROTONIX) 20 MG tablet Take 1 tablet by mouth daily Yes Chidi Gaspar MD   Cholecalciferol (VITAMIN D3) 25 MCG (1000 UT) CAPS Take 1 capsule by mouth daily Yes Chidi Gaspar MD   citalopram (CELEXA) 10 MG tablet Take 1 tablet by mouth daily Yes Chidi Gaspar MD   tiZANidine (ZANAFLEX) 4 MG tablet Take 1 tablet by mouth nightly as needed (PRN) Yes Chidi Gaspar MD   naproxen (NAPROSYN) 500 MG tablet Take 1 tablet by mouth 2 times daily as needed for Pain Yes Chidi Gaspar MD   diclofenac sodium (VOLTAREN) 1 % GEL APPLY 2 GRAMS EXTERNALLY TO THE AFFECTED AREA THREE TIMES DAILY AS NEEDED FOR PAIN Yes Chidi Gaspar MD   fluorometholone (FML) 0.1 % ophthalmic suspension 1 drop every 4 hours Yes Historical Provider, MD   Elastic Bandages & Supports (JOBST RELIEF 30-40MMHG MEDIUM) MISC 1 Device by Does not apply route daily Yes Chidi Gaspar MD   tolterodine (DETROL LA) 4 MG extended release capsule Take 1 capsule by mouth daily Yes Chidi Gaspar MD   sodium chloride (YULIYA 128) 5 % ophthalmic solution Place 1 drop into the left eye every 4 hours Yes Historical Provider, MD   calcium carbonate-vitamin D (CALTRATE 600+D) 600-400 MG-UNIT TABS per tab Take 1 tablet by mouth daily Yes Chidi Gaspar MD   Carboxymethylcellul-Glycerin 0.5-0.9 % SOLN Apply to eye Yes Historical Provider, MD   fluticasone (FLONASE) 50 MCG/ACT nasal spray 2 sprays by Nasal route daily as needed for Rhinitis Yes Chidi Gaspar MD       ROS: COMPREHENSIVE ROS AS IN HX, REST -VE  History obtained from chart review and the patient       OBJECTIVE:   NURSING NOTE AND VITALS REVIEWED  /80 (Site: Left Upper Arm, Position: Sitting, Cuff Size: Medium Adult)   Pulse 59   Temp 98 °F (36.7 °C) (Oral)   Ht 5' 10\" (1.778 m)   Wt 179 lb 6.4 oz (81.4 kg)   SpO2 99%   BMI 25.74 kg/m²     NO ACUTE DISTRESS    REPEAT BP: 124/78 (LT), NO ORTHOSTASIS     REPEAT PULSE: 68 - MANUAL    Body mass index is 25.74 kg/m². HEENT: NO PALLOR, ANICTERIC, PERRLA, EOMI, NO CONJUNCTIVAL ERYTHEMA,                 NO SINUS TENDERNESS  NECK:  SUPPLE, TRACHEA MIDLINE, NT, NO JVD, NO CB, NO LA, NO TM, NO STIFFNESS  CHEST: RESPY EFFORT NL, GOOD AE, NO W/R/C  HEART: S1S2+ REG, NO M/G/R  ABD: SOFT, NT, NO HSM, BS+  EXT: TRACE EDEMA, NT, PULSES +.  LANDY'S -VE  NEURO: ALERT AND ORIENTED X 3, NO MENINGEAL SIGNS, NO TREMORS, NL GAIT, NO FOCAL DEFICITS  PSYCH: FAIRLY GOOD AFFECT  BACK: NT, NO ROM, NO CVA TENDERNESS     PREVIOUS LABS REVIEWED AND D/W PT    ACCUCHECK: 101    ASSESSMENT / PLAN:     Diagnosis Orders   1. Primary hypertension  COUNSELLED. CONTROLLED ON MEDS  CONTINUE AVAPRO AND CLONIDINE  LOW NA+ / DASH DIET/ EXERCISE.   MONITOR. GOAL </= 130/80  MAKE CHANGES AS NEEDED. 2. Mixed hyperlipidemia  COUNSELLED. DEFERRED MED  ADVISED LOW FAT / CHOL DIET/ EXERCISE.  MONITOR. GOALS D/W PT.  MAKE CHANGES AS NEEDED. 3. Prediabetes  COUNSELLED. ADVISED ON DIET / EXERCISE  ADVISED RISK FACTOR MODIFICATION. MONITOR  MAKE CHANGES AS NEEDED. 4. Anxiety  COUNSELLED. ADVISED ON MED  PT COUNSELLED  ON RELAXATION TECHNIQUES. ADDRESSED STRESS MGT. MONITOR  PT NOT SUICIDAL/ NOT HOMICIDAL  MAKE CHANGES AS NEEDED. 5. Heartburn  COUNSELLED. DEFERRED MED CHANGE  CONTINUE MGT. ANTIREFLUX PRECAUTIONS ADVISED. MONITOR. MAKE CHANGES AS NEEDED. 6. Vitamin D deficiency  COUNSELLED. MONITOR ON VIT D SUPPLEMENT. MAKE CHANGES AS NEEDED. 7. Rectal bleed  COUNSELLED. RESOLVED  ADVISED ON NEED FOR GI EVAL  PT VERBALIZED UNDERSTANDING  MAKE CHANGES AS NEEDED.                  PT DECLINED PNEUMONIA AND TDAP VACCINES - READDRESS           MEDICATION SIDE EFFECTS D/W PATIENT    RETURN TO CLINIC WITHIN 4 MONTHS / PRN    ADVISED FOLLOW UP GI EVAL

## 2023-01-06 NOTE — PATIENT INSTRUCTIONS
TAKE MED AS ADVISED    DIET/ EXERCISE. FOLLOW UP WITHIN 4 MONTHS / [address] Laboratory Locations - No appointment necessary. @ indicates the location is open Saturdays in addition to Monday through Friday. Call your preferred location for test preparation, business hours and other information you need. [employer] accepts [employer]. [facility]    @ [place] [facility]. 3 [facility] [address] [place] [address]. [place], [facility] [address]   Ph: [phone] [facility] Lab Svcs. [address]   Ph: [phone]  @ [place] [facility]. 3155 [facility]   Ph: [phone]    [facility] Svcs. [address] [facility] [address]   Ph: [phone] @ [place] [facility] Svcs. [address]  Ph: [phone] @ [place] [facility] Lab Svcs. [address] [facility] 429   Ph: [phone]     [facility] Svcs. [facility]  Ph: [phone]    [place]   @ [place] [facility] Svcs. [address] [place], [place] [address]   Ph: [phone] [facility]. 2157 [facility], [address] [place] [address]  Ph: [address] Lab Svcs. 54 [facility]   Ph: [phone] [facility]. Lab Svcs.   211 [facility], [address] [facility]   Ph: [phone]

## 2023-02-27 DIAGNOSIS — R60.0 LOCALIZED EDEMA: ICD-10-CM

## 2023-02-28 NOTE — TELEPHONE ENCOUNTER
Medication:   Requested Prescriptions     Pending Prescriptions Disp Refills    furosemide (LASIX) 20 MG tablet [Pharmacy Med Name: FUROSEMIDE 20MG TABLETS] 30 tablet 1     Sig: TAKE 1 TABLET BY MOUTH DAILY AS NEEDED FOR SWELLING        Last Filled: 12/30/22    Patient Phone Number: [phone] (home)     Last appt: 1/6/2023   Next appt: 5/8/2023

## 2023-03-01 RX ORDER — FUROSEMIDE 20 MG/1
TABLET ORAL
Qty: 30 TABLET | Refills: 1 | Status: SHIPPED | OUTPATIENT
Start: 2023-03-01

## 2023-03-28 ENCOUNTER — TELEPHONE (OUTPATIENT)
Dept: INTERNAL MEDICINE CLINIC | Age: 73
End: 2023-03-28

## 2023-04-17 DIAGNOSIS — I10 PRIMARY HYPERTENSION: ICD-10-CM

## 2023-04-17 RX ORDER — CLONIDINE HYDROCHLORIDE 0.2 MG/1
0.2 TABLET ORAL DAILY
Qty: 90 TABLET | Refills: 0 | Status: SHIPPED | OUTPATIENT
Start: 2023-04-17

## 2023-04-17 NOTE — TELEPHONE ENCOUNTER
Medication:   Requested Prescriptions     Pending Prescriptions Disp Refills    cloNIDine (CATAPRES) 0.2 MG tablet [Pharmacy Med Name: CLONIDINE 0.2MG TABLETS] 90 tablet 0     Sig: TAKE 1 TABLET BY MOUTH DAILY        Last Filled: 1/6/2023    Patient Phone Number: [phone] (home)     Last appt: 1/6/2023   Next appt: 5/8/2023

## 2023-04-29 DIAGNOSIS — I10 PRIMARY HYPERTENSION: ICD-10-CM

## 2023-04-29 DIAGNOSIS — R60.0 LOCALIZED EDEMA: ICD-10-CM

## 2023-05-02 DIAGNOSIS — I10 PRIMARY HYPERTENSION: ICD-10-CM

## 2023-05-02 DIAGNOSIS — R60.0 LOCALIZED EDEMA: ICD-10-CM

## 2023-05-02 RX ORDER — FUROSEMIDE 20 MG/1
TABLET ORAL
Qty: 30 TABLET | Refills: 1 | OUTPATIENT
Start: 2023-05-02

## 2023-05-02 RX ORDER — IRBESARTAN 300 MG/1
300 TABLET ORAL DAILY
Qty: 90 TABLET | Refills: 0 | Status: SHIPPED | OUTPATIENT
Start: 2023-05-02

## 2023-05-02 RX ORDER — FUROSEMIDE 20 MG/1
TABLET ORAL
Qty: 30 TABLET | Refills: 1 | Status: SHIPPED | OUTPATIENT
Start: 2023-05-02

## 2023-05-02 RX ORDER — IRBESARTAN 300 MG/1
300 TABLET ORAL DAILY
Qty: 90 TABLET | Refills: 0 | OUTPATIENT
Start: 2023-05-02

## 2023-05-02 NOTE — TELEPHONE ENCOUNTER
Medication:   Requested Prescriptions     Pending Prescriptions Disp Refills    furosemide (LASIX) 20 MG tablet 30 tablet 1     Sig: TAKE 1 TABLET BY MOUTH DAILY AS NEEDED FOR SWELLING    irbesartan (AVAPRO) 300 MG tablet 90 tablet 0     Sig: Take 1 tablet by mouth daily        Last Filled:      Patient Phone Number: [phone] (home)     Last appt: 1/6/2023   Next appt: 6/21/2023    Last OARRS: No flowsheet data found.

## 2023-06-18 SDOH — ECONOMIC STABILITY: FOOD INSECURITY: WITHIN THE PAST 12 MONTHS, YOU WORRIED THAT YOUR FOOD WOULD RUN OUT BEFORE YOU GOT MONEY TO BUY MORE.: NEVER TRUE

## 2023-06-18 SDOH — ECONOMIC STABILITY: FOOD INSECURITY: WITHIN THE PAST 12 MONTHS, THE FOOD YOU BOUGHT JUST DIDN'T LAST AND YOU DIDN'T HAVE MONEY TO GET MORE.: NEVER TRUE

## 2023-06-18 SDOH — ECONOMIC STABILITY: INCOME INSECURITY: HOW HARD IS IT FOR YOU TO PAY FOR THE VERY BASICS LIKE FOOD, HOUSING, MEDICAL CARE, AND HEATING?: SOMEWHAT HARD

## 2023-06-18 SDOH — ECONOMIC STABILITY: HOUSING INSECURITY
IN THE LAST 12 MONTHS, WAS THERE A TIME WHEN YOU DID NOT HAVE A STEADY PLACE TO SLEEP OR SLEPT IN A SHELTER (INCLUDING NOW)?: NO

## 2023-06-18 SDOH — ECONOMIC STABILITY: TRANSPORTATION INSECURITY
IN THE PAST 12 MONTHS, HAS LACK OF TRANSPORTATION KEPT YOU FROM MEETINGS, WORK, OR FROM GETTING THINGS NEEDED FOR DAILY LIVING?: NO

## 2023-06-21 ENCOUNTER — OFFICE VISIT (OUTPATIENT)
Dept: INTERNAL MEDICINE CLINIC | Age: 73
End: 2023-06-21
Payer: COMMERCIAL

## 2023-06-21 VITALS
BODY MASS INDEX: 24.97 KG/M2 | WEIGHT: 174 LBS | SYSTOLIC BLOOD PRESSURE: 130 MMHG | DIASTOLIC BLOOD PRESSURE: 80 MMHG | OXYGEN SATURATION: 99 % | HEART RATE: 62 BPM

## 2023-06-21 DIAGNOSIS — R73.03 PREDIABETES: ICD-10-CM

## 2023-06-21 DIAGNOSIS — R60.0 LOCALIZED EDEMA: ICD-10-CM

## 2023-06-21 DIAGNOSIS — R12 HEARTBURN: ICD-10-CM

## 2023-06-21 DIAGNOSIS — E55.9 VITAMIN D DEFICIENCY: ICD-10-CM

## 2023-06-21 DIAGNOSIS — F41.9 ANXIETY: Primary | ICD-10-CM

## 2023-06-21 DIAGNOSIS — E78.2 MIXED HYPERLIPIDEMIA: ICD-10-CM

## 2023-06-21 DIAGNOSIS — I10 PRIMARY HYPERTENSION: ICD-10-CM

## 2023-06-21 PROCEDURE — G8420 CALC BMI NORM PARAMETERS: HCPCS | Performed by: INTERNAL MEDICINE

## 2023-06-21 PROCEDURE — 3075F SYST BP GE 130 - 139MM HG: CPT | Performed by: INTERNAL MEDICINE

## 2023-06-21 PROCEDURE — 3017F COLORECTAL CA SCREEN DOC REV: CPT | Performed by: INTERNAL MEDICINE

## 2023-06-21 PROCEDURE — 3079F DIAST BP 80-89 MM HG: CPT | Performed by: INTERNAL MEDICINE

## 2023-06-21 PROCEDURE — 1123F ACP DISCUSS/DSCN MKR DOCD: CPT | Performed by: INTERNAL MEDICINE

## 2023-06-21 PROCEDURE — G8427 DOCREV CUR MEDS BY ELIG CLIN: HCPCS | Performed by: INTERNAL MEDICINE

## 2023-06-21 PROCEDURE — 99214 OFFICE O/P EST MOD 30 MIN: CPT | Performed by: INTERNAL MEDICINE

## 2023-06-21 PROCEDURE — 1090F PRES/ABSN URINE INCON ASSESS: CPT | Performed by: INTERNAL MEDICINE

## 2023-06-21 PROCEDURE — 1036F TOBACCO NON-USER: CPT | Performed by: INTERNAL MEDICINE

## 2023-06-21 PROCEDURE — G8399 PT W/DXA RESULTS DOCUMENT: HCPCS | Performed by: INTERNAL MEDICINE

## 2023-06-21 RX ORDER — CLONIDINE HYDROCHLORIDE 0.2 MG/1
0.2 TABLET ORAL DAILY
Qty: 90 TABLET | Refills: 0 | Status: CANCELLED | OUTPATIENT
Start: 2023-06-21

## 2023-06-21 RX ORDER — IRBESARTAN 300 MG/1
300 TABLET ORAL DAILY
Qty: 90 TABLET | Refills: 0 | Status: SHIPPED | OUTPATIENT
Start: 2023-06-21

## 2023-06-21 RX ORDER — FUROSEMIDE 20 MG/1
TABLET ORAL
Qty: 30 TABLET | Refills: 1 | Status: SHIPPED | OUTPATIENT
Start: 2023-06-21

## 2023-06-21 RX ORDER — IRBESARTAN 300 MG/1
300 TABLET ORAL DAILY
Qty: 90 TABLET | Refills: 0 | Status: CANCELLED | OUTPATIENT
Start: 2023-06-21

## 2023-06-21 RX ORDER — CLONIDINE HYDROCHLORIDE 0.2 MG/1
0.2 TABLET ORAL DAILY
Qty: 90 TABLET | Refills: 0 | Status: SHIPPED | OUTPATIENT
Start: 2023-06-21

## 2023-06-21 RX ORDER — FUROSEMIDE 20 MG/1
TABLET ORAL
Qty: 30 TABLET | Refills: 1 | Status: CANCELLED | OUTPATIENT
Start: 2023-06-21

## 2023-06-21 NOTE — PROGRESS NOTES
TECHNIQUES. ADDRESSED STRESS MGT. MONITOR  PT NOT SUICIDAL/ NOT HOMICIDAL  MAKE CHANGES AS NEEDED. 2. Primary hypertension  COUNSELLED. CONTROLLED. CONTINUE MEDS. LOW NA+ / DASH DIET/ EXERCISE. MONITOR. GOAL </= 130/80  MAKE CHANGES AS NEEDED. 3. Mixed hyperlipidemia  COUNSELLED. DEFERRED MED  ADVISED LOW FAT / CHOL DIET/ EXERCISE.  MONITOR. F/U LABS  GOALS D/W PT.  MAKE CHANGES AS NEEDED. 4. Prediabetes  COUNSELLED. ADVISED ON DIET / EXERCISE  ADVISED RISK FACTOR MODIFICATION. F/U LABS TO [facility]. MONITOR  MAKE CHANGES AS NEEDED. 5. Heartburn  COUNSELLED. CONTINUE MGT. ANTIREFLUX PRECAUTIONS ADVISED. MONITOR. MAKE CHANGES AS NEEDED. 6. Vitamin D deficiency  COUNSELLED. MONITOR ON VIT D SUPPLEMENT. MAKE CHANGES AS NEEDED. 7. Localized edema  COUNSELLED. ADVISED LOW NA+ DIET. ELEVATE LEGS. COMPRESSION STOCKINGS. LASIX PRN. MONITOR  MAKE CHANGES AS NEEDED.                           MEDICATION SIDE EFFECTS D/W PATIENT      RETURN TO CLINIC WITHIN 4 MONTHS / PRN    FOLLOW UP FOR FASTING LABS

## 2023-06-21 NOTE — PATIENT INSTRUCTIONS
TAKE MED AS ADVISED    DIET/ EXERCISE. FOLLOW UP WITHIN 4 MONTHS / AS NEEDED    FOLLOW UP FOR FASTING [facility] Laboratory Locations - No appointment necessary. ? indicates the location is open Saturdays in addition to Monday through Friday. Call your preferred location for test preparation, business hours and other information you need. [employer] accepts [employer]. [facility]    ? [place] [id number] 5980 [facility], [address]    Ph: 28 [facility], 6500 [facility] [address]    Ph: [phone]   ? 2401 [facility].,    [facility]    Ph: Dianne [address]    Ph: [phone] ? [address]   Ph: [phone]  ? 215 [facility]. [facility] 429    Ph: [address] [place], [place] [address]Aquilino martin 19   Ph: [phone]    NORTH    ? [address] [place] [address]    Ph: [phone]  [facility]   3280 Cir[facility], [address]   Ph: Milana [address] Nicole Lowr. [address] 30: 316 [address]ie [address] Liborio [facility]    Ph: 2215 [facility].  176 Joss Cramer [place], [place] [address]    Ph: [phone]

## 2023-10-14 DIAGNOSIS — I10 PRIMARY HYPERTENSION: ICD-10-CM

## 2023-10-17 RX ORDER — CLONIDINE HYDROCHLORIDE 0.2 MG/1
0.2 TABLET ORAL DAILY
Qty: 90 TABLET | Refills: 0 | Status: SHIPPED | OUTPATIENT
Start: 2023-10-17

## 2023-10-23 ENCOUNTER — OFFICE VISIT (OUTPATIENT)
Dept: INTERNAL MEDICINE CLINIC | Age: 73
End: 2023-10-23
Payer: COMMERCIAL

## 2023-10-23 VITALS
HEART RATE: 81 BPM | DIASTOLIC BLOOD PRESSURE: 80 MMHG | SYSTOLIC BLOOD PRESSURE: 126 MMHG | OXYGEN SATURATION: 100 % | TEMPERATURE: 97.6 F | WEIGHT: 170 LBS | BODY MASS INDEX: 24.39 KG/M2

## 2023-10-23 DIAGNOSIS — E55.9 VITAMIN D DEFICIENCY: ICD-10-CM

## 2023-10-23 DIAGNOSIS — F41.9 ANXIETY: ICD-10-CM

## 2023-10-23 DIAGNOSIS — E78.2 MIXED HYPERLIPIDEMIA: ICD-10-CM

## 2023-10-23 DIAGNOSIS — L98.9 SKIN LESION OF BACK: ICD-10-CM

## 2023-10-23 DIAGNOSIS — R73.03 PREDIABETES: ICD-10-CM

## 2023-10-23 DIAGNOSIS — I10 PRIMARY HYPERTENSION: Primary | ICD-10-CM

## 2023-10-23 PROCEDURE — G8399 PT W/DXA RESULTS DOCUMENT: HCPCS | Performed by: INTERNAL MEDICINE

## 2023-10-23 PROCEDURE — 99214 OFFICE O/P EST MOD 30 MIN: CPT | Performed by: INTERNAL MEDICINE

## 2023-10-23 PROCEDURE — G8484 FLU IMMUNIZE NO ADMIN: HCPCS | Performed by: INTERNAL MEDICINE

## 2023-10-23 PROCEDURE — 3017F COLORECTAL CA SCREEN DOC REV: CPT | Performed by: INTERNAL MEDICINE

## 2023-10-23 PROCEDURE — G8420 CALC BMI NORM PARAMETERS: HCPCS | Performed by: INTERNAL MEDICINE

## 2023-10-23 PROCEDURE — 1090F PRES/ABSN URINE INCON ASSESS: CPT | Performed by: INTERNAL MEDICINE

## 2023-10-23 PROCEDURE — 3079F DIAST BP 80-89 MM HG: CPT | Performed by: INTERNAL MEDICINE

## 2023-10-23 PROCEDURE — 1123F ACP DISCUSS/DSCN MKR DOCD: CPT | Performed by: INTERNAL MEDICINE

## 2023-10-23 PROCEDURE — 1036F TOBACCO NON-USER: CPT | Performed by: INTERNAL MEDICINE

## 2023-10-23 PROCEDURE — 3074F SYST BP LT 130 MM HG: CPT | Performed by: INTERNAL MEDICINE

## 2023-10-23 PROCEDURE — G8427 DOCREV CUR MEDS BY ELIG CLIN: HCPCS | Performed by: INTERNAL MEDICINE

## 2023-10-23 RX ORDER — IRBESARTAN 300 MG/1
300 TABLET ORAL DAILY
Qty: 90 TABLET | Refills: 0 | Status: SHIPPED | OUTPATIENT
Start: 2023-10-23

## 2023-10-23 NOTE — PATIENT INSTRUCTIONS
TAKE MED AS ADVISED    DIET/ EXERCISE. FOLLOW UP WITHIN 4 MONTHS / AS NEEDED    FOLLOW UP [facility] Laboratory Locations - No appointment necessary. ? indicates the location is open Saturdays in addition to Monday through Friday. Call your preferred location for test preparation, business hours and other information you need. [employer] accepts [employer]. [facility]    ? [place] [id number] 45 [facility]. [facility], [address]    Ph: [address] [facility], [address] [address]    Ph: [phone]   ? [address]    [place], [address] [place] [address]    Ph: [address] [facility] [address] [place], [address] [facility]    Ph: [phone] ? [place]   [address]   Ph: [phone]  ? 707 [facility], 211 [facility]    Ph: [place] 201 [facility], 1235 [facility]   Ph: [phone]    NORTH    ? [place], [place] [address]    Ph: [phone]  [facility]   1221 [facility], [facility]   Ph: [place]one [place], [address] [facility]: 015 688 Lele Johnathan [place], [facility]5 [facility]    Ph: 713 [facility].  [address] [place] [address]    Ph: [phone]

## 2023-10-23 NOTE — PROGRESS NOTES
COUNSELLED. ADVISED LOW FAT / CHOL DIET/ EXERCISE.  MONITOR. GOALS D/W PT.  MAKE CHANGES AS NEEDED. 3. Prediabetes  COUNSELLED. ADVISED ON DIET / EXERCISE  ADVISED RISK FACTOR MODIFICATION. MONITOR  MAKE CHANGES AS NEEDED. 4. Anxiety  COUNSELLED. READDRESS MED  PT COUNSELLED  ON RELAXATION TECHNIQUES. ADDRESSED STRESS MGT. MONITOR  PT NOT SUICIDAL/ NOT HOMICIDAL  MAKE CHANGES AS NEEDED. 5. Vitamin D deficiency  COUNSELLED. MONITOR ON VIT D SUPPLEMENT. MAKE CHANGES AS NEEDED. 6. Skin lesion of back  COUNSELLED. REFER DERM FOR EVAL  / BIOPSY. MONITOR  MAKE CHANGES AS NEEDED.                  PT DECLINED INFLUENZA VACCINE       MEDICATION SIDE EFFECTS D/W PATIENT      RETURN TO CLINIC WITHIN 4 MONTHS / PRN    FOLLOW UP WITH DERMATOLOGY

## 2023-11-23 DIAGNOSIS — R12 HEARTBURN: ICD-10-CM

## 2023-11-29 DIAGNOSIS — R12 HEARTBURN: ICD-10-CM

## 2023-11-29 RX ORDER — PANTOPRAZOLE SODIUM 20 MG/1
20 TABLET, DELAYED RELEASE ORAL DAILY
Qty: 30 TABLET | Refills: 3 | Status: SHIPPED | OUTPATIENT
Start: 2023-11-29

## 2023-11-29 NOTE — TELEPHONE ENCOUNTER
Medication:   Requested Prescriptions     Pending Prescriptions Disp Refills    pantoprazole (PROTONIX) 20 MG tablet 30 tablet 3     Sig: Take 1 tablet by mouth daily        Last Filled:      Patient Phone Number: [phone] (home)     Last appt: 10/23/2023   Next appt: 2/22/2024    Last OARRS:        No data to display

## 2023-12-01 RX ORDER — PANTOPRAZOLE SODIUM 20 MG/1
20 TABLET, DELAYED RELEASE ORAL DAILY
Qty: 30 TABLET | Refills: 3 | OUTPATIENT
Start: 2023-12-01

## 2024-01-16 DIAGNOSIS — I10 PRIMARY HYPERTENSION: ICD-10-CM

## 2024-01-18 ENCOUNTER — TELEPHONE (OUTPATIENT)
Dept: INTERNAL MEDICINE CLINIC | Age: 74
End: 2024-01-18

## 2024-01-18 DIAGNOSIS — I10 PRIMARY HYPERTENSION: ICD-10-CM

## 2024-01-18 RX ORDER — IRBESARTAN 300 MG/1
300 TABLET ORAL DAILY
Qty: 90 TABLET | Refills: 0 | OUTPATIENT
Start: 2024-01-18

## 2024-01-18 RX ORDER — CLONIDINE HYDROCHLORIDE 0.2 MG/1
0.2 TABLET ORAL DAILY
Qty: 90 TABLET | Refills: 0 | OUTPATIENT
Start: 2024-01-18

## 2024-01-18 RX ORDER — IRBESARTAN 300 MG/1
300 TABLET ORAL DAILY
Qty: 90 TABLET | Refills: 0 | Status: SHIPPED | OUTPATIENT
Start: 2024-01-18

## 2024-01-18 RX ORDER — CLONIDINE HYDROCHLORIDE 0.2 MG/1
0.2 TABLET ORAL DAILY
Qty: 90 TABLET | Refills: 0 | Status: SHIPPED | OUTPATIENT
Start: 2024-01-18

## 2024-01-18 NOTE — TELEPHONE ENCOUNTER
Medication:   Requested Prescriptions     Pending Prescriptions Disp Refills    cloNIDine (CATAPRES) 0.2 MG tablet 90 tablet 0     Sig: Take 1 tablet by mouth daily    irbesartan (AVAPRO) 300 MG tablet 90 tablet 0     Sig: Take 1 tablet by mouth daily        Last Filled:      Patient Phone Number: [phone] (home)     Last appt: 10/23/2023   Next appt: 2/22/2024    Last OARRS:        No data to display

## 2024-01-18 NOTE — TELEPHONE ENCOUNTER
Patient is checking the status of the refill on irbesartan 300 mg, and clonidine 0.2 mg.  Pharmacy sent refill request on 1/16, she will be out of meds on  Saturday.  Please advise

## 2024-01-19 DIAGNOSIS — I10 PRIMARY HYPERTENSION: ICD-10-CM

## 2024-01-19 RX ORDER — CLONIDINE HYDROCHLORIDE 0.2 MG/1
0.2 TABLET ORAL DAILY
Qty: 90 TABLET | Refills: 0 | OUTPATIENT
Start: 2024-01-19

## 2024-01-19 RX ORDER — IRBESARTAN 300 MG/1
300 TABLET ORAL DAILY
Qty: 90 TABLET | Refills: 0 | OUTPATIENT
Start: 2024-01-19

## 2024-01-19 NOTE — TELEPHONE ENCOUNTER
Medication:   Requested Prescriptions     Pending Prescriptions Disp Refills    cloNIDine (CATAPRES) 0.2 MG tablet 90 tablet 0     Sig: Take 1 tablet by mouth daily    irbesartan (AVAPRO) 300 MG tablet 90 tablet 0     Sig: Take 1 tablet by mouth daily        Last Filled:      Patient Phone Number: [phone] (home)     Last appt: 10/23/2023   Next appt: 2/22/2024    Last OARRS:        No data to display

## 2024-02-22 ENCOUNTER — OFFICE VISIT (OUTPATIENT)
Dept: INTERNAL MEDICINE CLINIC | Age: 74
End: 2024-02-22
Payer: COMMERCIAL

## 2024-02-22 VITALS
BODY MASS INDEX: 24.68 KG/M2 | WEIGHT: 172 LBS | OXYGEN SATURATION: 99 % | HEART RATE: 81 BPM | SYSTOLIC BLOOD PRESSURE: 128 MMHG | DIASTOLIC BLOOD PRESSURE: 80 MMHG

## 2024-02-22 DIAGNOSIS — F41.9 ANXIETY: ICD-10-CM

## 2024-02-22 DIAGNOSIS — R73.03 PREDIABETES: ICD-10-CM

## 2024-02-22 DIAGNOSIS — E55.9 VITAMIN D DEFICIENCY: ICD-10-CM

## 2024-02-22 DIAGNOSIS — E78.2 MIXED HYPERLIPIDEMIA: ICD-10-CM

## 2024-02-22 DIAGNOSIS — I10 PRIMARY HYPERTENSION: ICD-10-CM

## 2024-02-22 DIAGNOSIS — M25.562 ACUTE PAIN OF LEFT KNEE: Primary | ICD-10-CM

## 2024-02-22 DIAGNOSIS — R06.83 SNORING: ICD-10-CM

## 2024-02-22 PROCEDURE — 1123F ACP DISCUSS/DSCN MKR DOCD: CPT | Performed by: INTERNAL MEDICINE

## 2024-02-22 PROCEDURE — 1090F PRES/ABSN URINE INCON ASSESS: CPT | Performed by: INTERNAL MEDICINE

## 2024-02-22 PROCEDURE — G8420 CALC BMI NORM PARAMETERS: HCPCS | Performed by: INTERNAL MEDICINE

## 2024-02-22 PROCEDURE — 3017F COLORECTAL CA SCREEN DOC REV: CPT | Performed by: INTERNAL MEDICINE

## 2024-02-22 PROCEDURE — 99214 OFFICE O/P EST MOD 30 MIN: CPT | Performed by: INTERNAL MEDICINE

## 2024-02-22 PROCEDURE — G8399 PT W/DXA RESULTS DOCUMENT: HCPCS | Performed by: INTERNAL MEDICINE

## 2024-02-22 PROCEDURE — G8427 DOCREV CUR MEDS BY ELIG CLIN: HCPCS | Performed by: INTERNAL MEDICINE

## 2024-02-22 PROCEDURE — 1036F TOBACCO NON-USER: CPT | Performed by: INTERNAL MEDICINE

## 2024-02-22 PROCEDURE — 3079F DIAST BP 80-89 MM HG: CPT | Performed by: INTERNAL MEDICINE

## 2024-02-22 PROCEDURE — 3074F SYST BP LT 130 MM HG: CPT | Performed by: INTERNAL MEDICINE

## 2024-02-22 PROCEDURE — G8484 FLU IMMUNIZE NO ADMIN: HCPCS | Performed by: INTERNAL MEDICINE

## 2024-02-22 RX ORDER — IRBESARTAN 300 MG/1
300 TABLET ORAL DAILY
Qty: 90 TABLET | Refills: 1 | Status: SHIPPED | OUTPATIENT
Start: 2024-02-22

## 2024-02-22 RX ORDER — CLONIDINE HYDROCHLORIDE 0.2 MG/1
0.2 TABLET ORAL DAILY
Qty: 90 TABLET | Refills: 1 | Status: SHIPPED | OUTPATIENT
Start: 2024-02-22

## 2024-02-22 ASSESSMENT — PATIENT HEALTH QUESTIONNAIRE - PHQ9
SUM OF ALL RESPONSES TO PHQ QUESTIONS 1-9: 0
SUM OF ALL RESPONSES TO PHQ9 QUESTIONS 1 & 2: 0
1. LITTLE INTEREST OR PLEASURE IN DOING THINGS: 0
SUM OF ALL RESPONSES TO PHQ QUESTIONS 1-9: 0
2. FEELING DOWN, DEPRESSED OR HOPELESS: 0
SUM OF ALL RESPONSES TO PHQ QUESTIONS 1-9: 0
SUM OF ALL RESPONSES TO PHQ QUESTIONS 1-9: 0

## 2024-02-22 NOTE — PROGRESS NOTES
SUBJECTIVE:  Dorcas Alfredo is a 73 y.o. female HERE FOR   Chief Complaint   Patient presents with    Follow-up    Hypertension      PT HERE FOR EVAL     C/O LT KNEE PAIN - PAST FEW WEEKS.  SHARP PAIN, NO RAD. PAIN SCALE 10/10 @ MAX. NOW 3/10. NO SWELLING, DENIES FALL.  PREDIABETES - DIET / EXERCISE REVIEWED. + FH DM   HTN - TAKING MEDS - TOLERATING.  BP NOTED. + DIET / EXERCISE COMPLIANCE, DENIES HEADACHE, DIZZINESS No.   HLP - + DIET / EXERCISE COMPLIANCE. PREVIOUS LABS D/W PT   ANXIETY - ? MED COMPLIANCE. NO DEPRESSION.  INSOMNIA - IMPROVED. DENIES SUICIDAL / NO HOMICIDAL THOUGHTS / IDEATION  VIT D DEF - TAKING MED. LABS D/W PT   C/O SNORING - ? DURATION. CONCERNED ABOUT SLEEP APNEA     DENIES CP, No SOB, No PALPITATIONS, NO COUGH, NO F/C  No ABD PAIN, No N/V, No DIARRHEA, NO CONSTIPATION, No MELENA, NO HEMATOCHEZIA   No DYSURIA, No FREQ, + URGENCY - OLD, No HEMATURIA    PMH: REVIEWED AND UPDATED TODAY    PSH: REVIEWED AND UPDATED TODAY    SOCIAL HX: REVIEWED AND UPDATED TODAY    FAMILY HX: REVIEWED AND UPDATED TODAY    ALLERGY:  Celebrex [celecoxib], Sulfa antibiotics, and Codeine    MEDS: REVIEWED  Prior to Visit Medications    Medication Sig Taking? Authorizing Provider   cloNIDine (CATAPRES) 0.2 MG tablet Take 1 tablet by mouth daily Yes Annalee Castillo MD   irbesartan (AVAPRO) 300 MG tablet Take 1 tablet by mouth daily Yes Annalee Castillo MD   pantoprazole (PROTONIX) 20 MG tablet Take 1 tablet by mouth daily Yes Annalee Castillo MD   furosemide (LASIX) 20 MG tablet TAKE 1 TABLET BY MOUTH DAILY AS NEEDED FOR SWELLING Yes Annalee Castillo MD   Cholecalciferol (VITAMIN D3) 25 MCG (1000 UT) CAPS Take 1 capsule by mouth daily Yes Annalee Castillo MD   loratadine (CLARITIN) 10 MG tablet TAKE 1 TABLET BY MOUTH DAILY AS NEEDED Yes Annalee Castillo MD   citalopram (CELEXA) 10 MG tablet Take 1 tablet by mouth daily Yes Annalee Castillo MD   diclofenac sodium (VOLTAREN) 1 % GEL APPLY 2 GRAMS EXTERNALLY TO THE

## 2024-03-20 PROBLEM — I10 ESSENTIAL HYPERTENSION: Chronic | Status: ACTIVE | Noted: 2017-07-14

## 2024-03-21 ENCOUNTER — TELEPHONE (OUTPATIENT)
Dept: SLEEP CENTER | Age: 74
End: 2024-03-21

## 2024-03-21 NOTE — TELEPHONE ENCOUNTER
Called to schedule an hst per Cole smith for the pt to return my call     [facility] insurance

## 2024-03-28 ENCOUNTER — HOSPITAL ENCOUNTER (OUTPATIENT)
Dept: SLEEP CENTER | Age: 74
Discharge: HOME | End: 2024-03-28
Attending: INTERNAL MEDICINE
Payer: COMMERCIAL

## 2024-03-28 DIAGNOSIS — G47.10 HYPERSOMNIA: ICD-10-CM

## 2024-03-28 DIAGNOSIS — R06.83 SNORING: ICD-10-CM

## 2024-03-28 PROCEDURE — 95806 SLEEP STUDY UNATT&RESP EFFT: CPT

## 2024-04-01 ENCOUNTER — TELEPHONE (OUTPATIENT)
Dept: PULMONOLOGY | Age: 74
End: 2024-04-01

## 2024-04-01 NOTE — PROGRESS NOTES
Docras Alfredo         : 1950  [facility]    Diagnosis: [x] VIVIAN (G47.33) [] CSA (G47.31) [] Apnea (G47.30)   Length of Need: [x] 18 Months [] 99 Months [] Other:    Machine (CHRISTINA!): [] Respironics Dream Station   2   Auto [x] ResMed AirSense     Auto S11 or S10 (if bilevel) [] Other:     [x]  CPAP () [] Bilevel ()   Mode: [x] Auto [] Spontaneous    Mode: [] Auto [] Spontaneous      P min 6 cmH2O  P max 16 cmH2O      Comfort Settings:   - Ramp Pressure: 5 cmH2O                                        - Ramp time: 15 min                                     -  Flex/EPR - 3 full time                                    - For ResMed Bilevel (TiMax-4 sec   TiMin- 0.2 sec)     Humidifier: [x] Heated ()        [x] Water chamber replacement ()/ 1 per 6 months        Mask:   [x] Nasal () /1 per 3 months [x] Full Face () /1 per 3 months   [x] Patient choice -Size and fit mask [x] Patient Choice - Size and fit mask   [] Dispense:  [] Dispense:    [x] Headgear () / 1 per 3 months [x] Headgear () / 1 per 3 months   [x] Replacement Nasal Cushion ()/2 per month [x] Interface Replacement ()/1 per month   [x] Replacement Nasal Pillows ()/2 per month         Tubing: [x] Heated ()/1 per 3 months    [] Standard ()/1 per 3 months [] Other:           Filters: [x] Non-disposable ()/1 per 6 months     [x] Ultra-Fine, Disposable ()/2 per month        Miscellaneous: [] Chin Strap ()/ 1 per 6 months [] O2 bleed-in:       LPM   [] Oximetry on CPAP/Bilevel []  Other:    [x] Modem: ()         Start Order Date: 24    MEDICAL JUSTIFICATION:  I, the undersigned, certify that the above prescribed supplies are medically necessary for this patient’s wellbeing.  In my opinion, the supplies are both reasonable and necessary in reference to accepted standards of medicalpractice in treatment of this patient’s condition.    MONICA BEATTY MD      NPI: [id number]

## 2024-04-01 NOTE — TELEPHONE ENCOUNTER
Pt returning call to review HST results.  Order to be sent to [facility].  Pt is not at home and will need to be called to schedule f/u. Message left for registration.

## 2024-04-15 DIAGNOSIS — I10 PRIMARY HYPERTENSION: ICD-10-CM

## 2024-04-17 DIAGNOSIS — I10 PRIMARY HYPERTENSION: ICD-10-CM

## 2024-04-17 RX ORDER — CLONIDINE HYDROCHLORIDE 0.2 MG/1
0.2 TABLET ORAL DAILY
Qty: 90 TABLET | Refills: 1 | OUTPATIENT
Start: 2024-04-17

## 2024-04-17 RX ORDER — IRBESARTAN 300 MG/1
300 TABLET ORAL DAILY
Qty: 90 TABLET | Refills: 1 | OUTPATIENT
Start: 2024-04-17

## 2024-04-19 DIAGNOSIS — I10 PRIMARY HYPERTENSION: ICD-10-CM

## 2024-04-19 NOTE — TELEPHONE ENCOUNTER
Medication:   Requested Prescriptions     Pending Prescriptions Disp Refills    cloNIDine (CATAPRES) 0.2 MG tablet 90 tablet 1     Sig: Take 1 tablet by mouth daily        Last Filled:      Patient Phone Number: [phone] (home)     Last appt: 2/22/2024   Next appt: 5/22/2024    Last OARRS:        No data to display

## 2024-04-22 RX ORDER — CLONIDINE HYDROCHLORIDE 0.2 MG/1
0.2 TABLET ORAL DAILY
Qty: 90 TABLET | Refills: 1 | Status: SHIPPED | OUTPATIENT
Start: 2024-04-22

## 2024-05-03 ENCOUNTER — TELEPHONE (OUTPATIENT)
Dept: PULMONOLOGY | Age: 74
End: 2024-05-03

## 2024-05-03 NOTE — TELEPHONE ENCOUNTER
Pt called in stating that she wanted to cancel her appt because her dme company would not cover a new machine because her study was not at 4%. Pt would like to know if there is anything that could be done to change it so pt could still get machine.     Ph.[phone]

## 2024-05-06 NOTE — TELEPHONE ENCOUNTER
Left message for patient to return call to see if she had regular [facility] insurance or [employer] Medicare plan

## 2024-05-06 NOTE — TELEPHONE ENCOUNTER
Patient returned call and sad that she has [employer] Medicare Advantage.  The card that is scanned in shows the Medicare, but when it comes up in the Epic coverage it does look like straight [facility], but it is filed correctly.

## 2024-05-19 SDOH — HEALTH STABILITY: PHYSICAL HEALTH: ON AVERAGE, HOW MANY DAYS PER WEEK DO YOU ENGAGE IN MODERATE TO STRENUOUS EXERCISE (LIKE A BRISK WALK)?: 6 DAYS

## 2024-05-19 SDOH — HEALTH STABILITY: PHYSICAL HEALTH: ON AVERAGE, HOW MANY MINUTES DO YOU ENGAGE IN EXERCISE AT THIS LEVEL?: 60 MIN

## 2024-05-19 ASSESSMENT — PATIENT HEALTH QUESTIONNAIRE - PHQ9
2. FEELING DOWN, DEPRESSED OR HOPELESS: NOT AT ALL
SUM OF ALL RESPONSES TO PHQ9 QUESTIONS 1 & 2: 0
SUM OF ALL RESPONSES TO PHQ QUESTIONS 1-9: 0
1. LITTLE INTEREST OR PLEASURE IN DOING THINGS: NOT AT ALL

## 2024-05-19 ASSESSMENT — LIFESTYLE VARIABLES
HOW OFTEN DO YOU HAVE A DRINK CONTAINING ALCOHOL: NEVER
HOW OFTEN DO YOU HAVE A DRINK CONTAINING ALCOHOL: 1
HOW MANY STANDARD DRINKS CONTAINING ALCOHOL DO YOU HAVE ON A TYPICAL DAY: 0
HOW OFTEN DO YOU HAVE SIX OR MORE DRINKS ON ONE OCCASION: 1
HOW MANY STANDARD DRINKS CONTAINING ALCOHOL DO YOU HAVE ON A TYPICAL DAY: PATIENT DOES NOT DRINK

## 2024-05-22 ENCOUNTER — OFFICE VISIT (OUTPATIENT)
Dept: INTERNAL MEDICINE CLINIC | Age: 74
End: 2024-05-22
Payer: COMMERCIAL

## 2024-05-22 VITALS
DIASTOLIC BLOOD PRESSURE: 80 MMHG | OXYGEN SATURATION: 98 % | WEIGHT: 171 LBS | BODY MASS INDEX: 24.54 KG/M2 | HEART RATE: 66 BPM | SYSTOLIC BLOOD PRESSURE: 130 MMHG | TEMPERATURE: 97.9 F

## 2024-05-22 DIAGNOSIS — E55.9 VITAMIN D DEFICIENCY: ICD-10-CM

## 2024-05-22 DIAGNOSIS — F41.9 ANXIETY: ICD-10-CM

## 2024-05-22 DIAGNOSIS — G47.33 OSA (OBSTRUCTIVE SLEEP APNEA): ICD-10-CM

## 2024-05-22 DIAGNOSIS — E78.2 MIXED HYPERLIPIDEMIA: ICD-10-CM

## 2024-05-22 DIAGNOSIS — Z00.00 MEDICARE ANNUAL WELLNESS VISIT, SUBSEQUENT: Primary | ICD-10-CM

## 2024-05-22 DIAGNOSIS — M25.562 ACUTE PAIN OF LEFT KNEE: ICD-10-CM

## 2024-05-22 DIAGNOSIS — R73.03 PREDIABETES: ICD-10-CM

## 2024-05-22 DIAGNOSIS — I10 PRIMARY HYPERTENSION: ICD-10-CM

## 2024-05-22 PROCEDURE — 3017F COLORECTAL CA SCREEN DOC REV: CPT | Performed by: INTERNAL MEDICINE

## 2024-05-22 PROCEDURE — 3075F SYST BP GE 130 - 139MM HG: CPT | Performed by: INTERNAL MEDICINE

## 2024-05-22 PROCEDURE — 3079F DIAST BP 80-89 MM HG: CPT | Performed by: INTERNAL MEDICINE

## 2024-05-22 PROCEDURE — 1123F ACP DISCUSS/DSCN MKR DOCD: CPT | Performed by: INTERNAL MEDICINE

## 2024-05-22 PROCEDURE — G0439 PPPS, SUBSEQ VISIT: HCPCS | Performed by: INTERNAL MEDICINE

## 2024-05-22 RX ORDER — IRBESARTAN 300 MG/1
300 TABLET ORAL DAILY
Qty: 90 TABLET | Refills: 1 | Status: SHIPPED | OUTPATIENT
Start: 2024-05-22

## 2024-05-22 NOTE — PATIENT INSTRUCTIONS

## 2024-05-22 NOTE — PROGRESS NOTES
Codeine      TYLENIOL # 3     Prior to Visit Medications    Medication Sig Taking? Authorizing Provider   cloNIDine (CATAPRES) 0.2 MG tablet Take 1 tablet by mouth daily Yes Annalee Castillo MD   irbesartan (AVAPRO) 300 MG tablet Take 1 tablet by mouth daily Yes Annalee Castillo MD   pantoprazole (PROTONIX) 20 MG tablet Take 1 tablet by mouth daily Yes Annalee Castillo MD   furosemide (LASIX) 20 MG tablet TAKE 1 TABLET BY MOUTH DAILY AS NEEDED FOR SWELLING Yes Annalee Castillo MD   Cholecalciferol (VITAMIN D3) 25 MCG (1000 UT) CAPS Take 1 capsule by mouth daily Yes Annalee Castillo MD   loratadine (CLARITIN) 10 MG tablet TAKE 1 TABLET BY MOUTH DAILY AS NEEDED Yes Annalee Castillo MD   diclofenac sodium (VOLTAREN) 1 % GEL APPLY 2 GRAMS EXTERNALLY TO THE AFFECTED AREA THREE TIMES DAILY AS NEEDED FOR PAIN Yes Annalee Castillo MD   fluorometholone (FML) 0.1 % ophthalmic suspension 1 drop every 4 hours Yes ProviderNan MD   Elastic Bandages & Supports (JOBST RELIEF 30-40MMHG MEDIUM) MISC 1 Device by Does not apply route daily Yes Annalee Castillo MD   sodium chloride (YULIYA 128) 5 % ophthalmic solution Place 1 drop into the left eye every 4 hours Yes Nan Strange MD   calcium carbonate-vitamin D (CALTRATE 600+D) 600-400 MG-UNIT TABS per tab Take 1 tablet by mouth daily Yes Annalee Castillo MD   Carboxymethylcellul-Glycerin 0.5-0.9 % SOLN Apply to eye Yes ProviderNan MD       CareTeam (Including outside providers/suppliers regularly involved in providing care):   Patient Care Team:  Annalee Castillo MD as PCP - General (Internal Medicine)  Annalee Castillo MD as PCP - Empaneled Provider     Reviewed and updated this visit:  Tobacco  Allergies  Meds  Problems  Med Hx  Surg Hx  Soc Hx  Fam Hx          ROS: COMPREHENSIVE ROS AS IN HX, REST -VE  History obtained from chart review and the patient       OBJECTIVE:   NURSING NOTE AND VITALS REVIEWED  /88   Pulse 66   Temp 97.9

## 2024-05-23 DIAGNOSIS — R60.0 LOCALIZED EDEMA: ICD-10-CM

## 2024-05-23 RX ORDER — FUROSEMIDE 20 MG/1
TABLET ORAL
Qty: 30 TABLET | Refills: 1 | Status: SHIPPED | OUTPATIENT
Start: 2024-05-23

## 2024-05-23 NOTE — TELEPHONE ENCOUNTER
Medication:   Requested Prescriptions     Pending Prescriptions Disp Refills    furosemide (LASIX) 20 MG tablet 30 tablet 1     Sig: TAKE 1 TABLET BY MOUTH DAILY AS NEEDED FOR SWELLING        Last Filled:      Patient Phone Number: [phone] (home)     Last appt: 5/22/2024   Next appt: 9/3/2024    Last OARRS:        No data to display

## 2024-08-16 RX ORDER — LORATADINE 10 MG/1
TABLET ORAL
Qty: 30 TABLET | Refills: 3 | Status: SHIPPED | OUTPATIENT
Start: 2024-08-16

## 2024-08-27 DIAGNOSIS — R73.03 PREDIABETES: ICD-10-CM

## 2024-08-27 DIAGNOSIS — F41.9 ANXIETY: ICD-10-CM

## 2024-08-27 DIAGNOSIS — E78.2 MIXED HYPERLIPIDEMIA: ICD-10-CM

## 2024-08-27 DIAGNOSIS — E55.9 VITAMIN D DEFICIENCY: ICD-10-CM

## 2024-08-27 DIAGNOSIS — I10 PRIMARY HYPERTENSION: ICD-10-CM

## 2024-08-27 LAB
25(OH)D3 SERPL-MCNC: 55.3 NG/ML
ALBUMIN SERPL-MCNC: 4.5 G/DL (ref 3.4–5)
ALBUMIN/GLOB SERPL: 1.9 {RATIO} (ref 1.1–2.2)
ALP SERPL-CCNC: 132 U/L (ref 40–129)
ALT SERPL-CCNC: 29 U/L (ref 10–40)
ANION GAP SERPL CALCULATED.3IONS-SCNC: 9 MMOL/L (ref 3–16)
AST SERPL-CCNC: 26 U/L (ref 15–37)
BASOPHILS # BLD: 0 K/UL (ref 0–0.2)
BASOPHILS NFR BLD: 0.6 %
BILIRUB SERPL-MCNC: 0.7 MG/DL (ref 0–1)
BUN SERPL-MCNC: 12 MG/DL (ref 7–20)
CALCIUM SERPL-MCNC: 10.3 MG/DL (ref 8.3–10.6)
CHLORIDE SERPL-SCNC: 103 MMOL/L (ref 99–110)
CHOLEST SERPL-MCNC: 191 MG/DL (ref 0–199)
CO2 SERPL-SCNC: 31 MMOL/L (ref 21–32)
CREAT SERPL-MCNC: 0.8 MG/DL (ref 0.6–1.2)
CREAT UR-MCNC: 96.8 MG/DL (ref 28–259)
DEPRECATED RDW RBC AUTO: 13.9 % (ref 12.4–15.4)
EOSINOPHIL # BLD: 0 K/UL (ref 0–0.6)
EOSINOPHIL NFR BLD: 0.1 %
GFR SERPLBLD CREATININE-BSD FMLA CKD-EPI: 78 ML/MIN/{1.73_M2}
GLUCOSE SERPL-MCNC: 88 MG/DL (ref 70–99)
HCT VFR BLD AUTO: 42 % (ref 36–48)
HDLC SERPL-MCNC: 53 MG/DL (ref 40–60)
HGB BLD-MCNC: 14 G/DL (ref 12–16)
LDLC SERPL CALC-MCNC: 125 MG/DL
LYMPHOCYTES # BLD: 1.1 K/UL (ref 1–5.1)
LYMPHOCYTES NFR BLD: 28.4 %
MCH RBC QN AUTO: 31.5 PG (ref 26–34)
MCHC RBC AUTO-ENTMCNC: 33.3 G/DL (ref 31–36)
MCV RBC AUTO: 94.4 FL (ref 80–100)
MICROALBUMIN UR DL<=1MG/L-MCNC: <1.2 MG/DL
MICROALBUMIN/CREAT UR: NORMAL MG/G (ref 0–30)
MONOCYTES # BLD: 0.3 K/UL (ref 0–1.3)
MONOCYTES NFR BLD: 8.1 %
NEUTROPHILS # BLD: 2.5 K/UL (ref 1.7–7.7)
NEUTROPHILS NFR BLD: 62.8 %
PLATELET # BLD AUTO: 252 K/UL (ref 135–450)
PMV BLD AUTO: 10.5 FL (ref 5–10.5)
POTASSIUM SERPL-SCNC: 5 MMOL/L (ref 3.5–5.1)
PROT SERPL-MCNC: 6.9 G/DL (ref 6.4–8.2)
RBC # BLD AUTO: 4.45 M/UL (ref 4–5.2)
SODIUM SERPL-SCNC: 143 MMOL/L (ref 136–145)
TRIGL SERPL-MCNC: 65 MG/DL (ref 0–150)
TSH SERPL DL<=0.005 MIU/L-ACNC: 0.53 UIU/ML (ref 0.27–4.2)
VLDLC SERPL CALC-MCNC: 13 MG/DL
WBC # BLD AUTO: 4 K/UL (ref 4–11)

## 2024-08-28 LAB
EST. AVERAGE GLUCOSE BLD GHB EST-MCNC: 111.2 MG/DL
HBA1C MFR BLD: 5.5 %

## 2024-08-31 SDOH — ECONOMIC STABILITY: FOOD INSECURITY: WITHIN THE PAST 12 MONTHS, THE FOOD YOU BOUGHT JUST DIDN'T LAST AND YOU DIDN'T HAVE MONEY TO GET MORE.: PATIENT DECLINED

## 2024-08-31 SDOH — ECONOMIC STABILITY: FOOD INSECURITY: WITHIN THE PAST 12 MONTHS, YOU WORRIED THAT YOUR FOOD WOULD RUN OUT BEFORE YOU GOT MONEY TO BUY MORE.: PATIENT DECLINED

## 2024-08-31 SDOH — ECONOMIC STABILITY: INCOME INSECURITY: HOW HARD IS IT FOR YOU TO PAY FOR THE VERY BASICS LIKE FOOD, HOUSING, MEDICAL CARE, AND HEATING?: PATIENT DECLINED

## 2024-09-03 ENCOUNTER — OFFICE VISIT (OUTPATIENT)
Dept: INTERNAL MEDICINE CLINIC | Age: 74
End: 2024-09-03
Payer: COMMERCIAL

## 2024-09-03 VITALS
WEIGHT: 172 LBS | DIASTOLIC BLOOD PRESSURE: 80 MMHG | SYSTOLIC BLOOD PRESSURE: 126 MMHG | BODY MASS INDEX: 24.68 KG/M2 | HEART RATE: 60 BPM | OXYGEN SATURATION: 99 %

## 2024-09-03 DIAGNOSIS — E55.9 VITAMIN D DEFICIENCY: ICD-10-CM

## 2024-09-03 DIAGNOSIS — F41.9 ANXIETY: ICD-10-CM

## 2024-09-03 DIAGNOSIS — E78.49 OTHER HYPERLIPIDEMIA: ICD-10-CM

## 2024-09-03 DIAGNOSIS — I10 PRIMARY HYPERTENSION: Primary | ICD-10-CM

## 2024-09-03 DIAGNOSIS — G47.33 OSA (OBSTRUCTIVE SLEEP APNEA): ICD-10-CM

## 2024-09-03 DIAGNOSIS — R73.03 PREDIABETES: ICD-10-CM

## 2024-09-03 DIAGNOSIS — M25.562 ACUTE PAIN OF BOTH KNEES: ICD-10-CM

## 2024-09-03 DIAGNOSIS — R39.15 URINARY URGENCY: ICD-10-CM

## 2024-09-03 DIAGNOSIS — M25.561 ACUTE PAIN OF BOTH KNEES: ICD-10-CM

## 2024-09-03 LAB
BILIRUBIN, POC: NEGATIVE
BLOOD URINE, POC: NEGATIVE
CLARITY, POC: CLEAR
COLOR, POC: YELLOW
GLUCOSE URINE, POC: NEGATIVE MG/DL
KETONES, POC: NEGATIVE MG/DL
LEUKOCYTE EST, POC: NORMAL
NITRITE, POC: NEGATIVE
PH, POC: 5.5
PROTEIN, POC: NEGATIVE MG/DL
SPECIFIC GRAVITY, POC: 1.01
UROBILINOGEN, POC: NORMAL MG/DL

## 2024-09-03 PROCEDURE — G8399 PT W/DXA RESULTS DOCUMENT: HCPCS | Performed by: INTERNAL MEDICINE

## 2024-09-03 PROCEDURE — 81002 URINALYSIS NONAUTO W/O SCOPE: CPT | Performed by: INTERNAL MEDICINE

## 2024-09-03 PROCEDURE — 3074F SYST BP LT 130 MM HG: CPT | Performed by: INTERNAL MEDICINE

## 2024-09-03 PROCEDURE — G8420 CALC BMI NORM PARAMETERS: HCPCS | Performed by: INTERNAL MEDICINE

## 2024-09-03 PROCEDURE — 3017F COLORECTAL CA SCREEN DOC REV: CPT | Performed by: INTERNAL MEDICINE

## 2024-09-03 PROCEDURE — 1036F TOBACCO NON-USER: CPT | Performed by: INTERNAL MEDICINE

## 2024-09-03 PROCEDURE — 1090F PRES/ABSN URINE INCON ASSESS: CPT | Performed by: INTERNAL MEDICINE

## 2024-09-03 PROCEDURE — 3079F DIAST BP 80-89 MM HG: CPT | Performed by: INTERNAL MEDICINE

## 2024-09-03 PROCEDURE — 99215 OFFICE O/P EST HI 40 MIN: CPT | Performed by: INTERNAL MEDICINE

## 2024-09-03 PROCEDURE — 1123F ACP DISCUSS/DSCN MKR DOCD: CPT | Performed by: INTERNAL MEDICINE

## 2024-09-03 PROCEDURE — G8427 DOCREV CUR MEDS BY ELIG CLIN: HCPCS | Performed by: INTERNAL MEDICINE

## 2024-09-03 PROCEDURE — G2211 COMPLEX E/M VISIT ADD ON: HCPCS | Performed by: INTERNAL MEDICINE

## 2024-09-03 RX ORDER — IRBESARTAN 300 MG/1
300 TABLET ORAL DAILY
Qty: 90 TABLET | Refills: 1 | Status: SHIPPED | OUTPATIENT
Start: 2024-09-03

## 2024-09-03 RX ORDER — CLONIDINE HYDROCHLORIDE 0.2 MG/1
0.2 TABLET ORAL DAILY
Qty: 90 TABLET | Refills: 1 | Status: SHIPPED | OUTPATIENT
Start: 2024-09-03

## 2024-09-03 RX ORDER — OXYBUTYNIN CHLORIDE 5 MG/1
5 TABLET, EXTENDED RELEASE ORAL DAILY
Qty: 30 TABLET | Refills: 3 | Status: SHIPPED | OUTPATIENT
Start: 2024-09-03

## 2024-09-03 RX ORDER — ROSUVASTATIN CALCIUM 5 MG/1
5 TABLET, COATED ORAL NIGHTLY
Qty: 30 TABLET | Refills: 3 | Status: SHIPPED | OUTPATIENT
Start: 2024-09-03

## 2024-09-03 NOTE — PATIENT INSTRUCTIONS
TAKE MED AS ADVISED    DIET/ EXERCISE.    FOLLOW UP WITHIN  4 MONTHS / AS NEEDED       Residential stability/Engagement in treatment

## 2024-09-03 NOTE — PROGRESS NOTES
SUBJECTIVE:  Dorcas Alfredo is a 73 y.o. female HERE FOR   Chief Complaint   Patient presents with    Follow-up    Hypertension        PT HERE FOR EVAL      HTN - TAKING MEDS.  BP NOTED. + DIET / EXERCISE COMPLIANCE, DENIES HEADACHE, NO DIZZINESS.   HLP - + DIET / EXERCISE COMPLIANCE. LDL NOT GOAL. LABS D/W PT   PREDIABETES - DIET / EXERCISE REVIEWED.  IMPROVED - LAB D/W PT  ANXIETY - RARELY TAKING MED. NO DEPRESSION.  INSOMNIA - IMPROVED. DENIES SUICIDAL / NO HOMICIDAL THOUGHTS / IDEATION. OVERALL IMPROVED  VIT D DEF - TAKING MED. LABS D/W PT   VIVIAN - SNORING  - UNCHANGED.  NOT USING C PAP DUE TO INSURANCE REASONS AND COST  C/O KNEE PAIN -  PT STATES NOW DOMINICK. LT > RT. INTERMITTENT.  SHARP PAIN, NO RAD. PAIN SCALE 4/10. NO SWELLING, DENIES FALL.  C/O URI URGENCY - STATED INCREASED RECENTLY. No DYSURIA, ? FREQ, No HEMATURIA     DENIES CP, No SOB, No PALPITATIONS, NO COUGH, NO F/C  No ABD PAIN, No N/V, No DIARRHEA, NO CONSTIPATION, No MELENA, NO HEMATOCHEZIA         PMH: REVIEWED AND UPDATED TODAY    PSH: REVIEWED AND UPDATED TODAY    SOCIAL HX: REVIEWED AND UPDATED TODAY    FAMILY HX: REVIEWED AND UPDATED TODAY    ALLERGY:  Celebrex [celecoxib], Sulfa antibiotics, and Codeine    MEDS: REVIEWED  Prior to Visit Medications    Medication Sig Taking? Authorizing Provider   loratadine (CLARITIN) 10 MG tablet TAKE 1 TABLET BY MOUTH DAILY AS NEEDED Yes Annalee Castillo MD   furosemide (LASIX) 20 MG tablet TAKE 1 TABLET BY MOUTH DAILY AS NEEDED FOR SWELLING Yes Annalee Castillo MD   irbesartan (AVAPRO) 300 MG tablet Take 1 tablet by mouth daily Yes Annalee Castillo MD   cloNIDine (CATAPRES) 0.2 MG tablet Take 1 tablet by mouth daily Yes Annalee Castillo MD   pantoprazole (PROTONIX) 20 MG tablet Take 1 tablet by mouth daily Yes Annalee Castillo MD   Cholecalciferol (VITAMIN D3) 25 MCG (1000 UT) CAPS Take 1 capsule by mouth daily Yes Annalee Castillo MD   diclofenac sodium (VOLTAREN) 1 % GEL APPLY 2 GRAMS EXTERNALLY TO THE

## 2024-09-04 LAB — BACTERIA UR CULT: NORMAL

## 2024-12-24 DIAGNOSIS — E78.49 OTHER HYPERLIPIDEMIA: ICD-10-CM

## 2024-12-26 NOTE — TELEPHONE ENCOUNTER
Medication:   Requested Prescriptions     Pending Prescriptions Disp Refills    rosuvastatin (CRESTOR) 5 MG tablet [Pharmacy Med Name: ROSUVASTATIN 5MG TABLETS] 30 tablet 3     Sig: TAKE 1 TABLET BY MOUTH EVERY NIGHT        Last Filled:      Patient Phone Number: [phone] (home)     Last appt: 9/3/2024   Next appt: 1/7/2025    Last OARRS:        No data to display

## 2024-12-27 DIAGNOSIS — E78.49 OTHER HYPERLIPIDEMIA: ICD-10-CM

## 2024-12-27 DIAGNOSIS — R39.15 URINARY URGENCY: ICD-10-CM

## 2024-12-27 NOTE — TELEPHONE ENCOUNTER
Medication:   Requested Prescriptions     Pending Prescriptions Disp Refills    rosuvastatin (CRESTOR) 5 MG tablet 30 tablet 3     Sig: Take 1 tablet by mouth nightly    oxyBUTYnin (DITROPAN XL) 5 MG extended release tablet 30 tablet 3     Sig: Take 1 tablet by mouth daily        Last Filled:      Patient Phone Number: [phone] (home)     Last appt: 9/3/2024   Next appt: 1/7/2025    Last OARRS:        No data to display

## 2024-12-30 RX ORDER — ROSUVASTATIN CALCIUM 5 MG/1
5 TABLET, COATED ORAL NIGHTLY
Qty: 30 TABLET | Refills: 3 | OUTPATIENT
Start: 2024-12-30

## 2024-12-30 RX ORDER — ROSUVASTATIN CALCIUM 5 MG/1
5 TABLET, COATED ORAL NIGHTLY
Qty: 30 TABLET | Refills: 3 | Status: SHIPPED | OUTPATIENT
Start: 2024-12-30

## 2024-12-30 RX ORDER — OXYBUTYNIN CHLORIDE 5 MG/1
5 TABLET, EXTENDED RELEASE ORAL DAILY
Qty: 30 TABLET | Refills: 3 | Status: SHIPPED | OUTPATIENT
Start: 2024-12-30

## 2025-01-04 ASSESSMENT — PATIENT HEALTH QUESTIONNAIRE - PHQ9
2. FEELING DOWN, DEPRESSED OR HOPELESS: NOT AT ALL
SUM OF ALL RESPONSES TO PHQ9 QUESTIONS 1 & 2: 0
SUM OF ALL RESPONSES TO PHQ QUESTIONS 1-9: 0
1. LITTLE INTEREST OR PLEASURE IN DOING THINGS: NOT AT ALL
SUM OF ALL RESPONSES TO PHQ QUESTIONS 1-9: 0

## 2025-01-07 ENCOUNTER — TELEPHONE (OUTPATIENT)
Dept: INTERNAL MEDICINE CLINIC | Age: 75
End: 2025-01-07

## 2025-01-07 NOTE — TELEPHONE ENCOUNTER
----- Message from Trisha WALLER sent at 1/7/2025  1:01 PM EST -----  Regarding: [facility] Appointment Request  [facility] Appointment Request    Patient needs appointment for [facility] Appointment Type: Existing Condition Follow Up/4 months follow up.    Patient Requested Dates(s): any date this week and next week  Patient Requested Time: any morning around 10:40 am  Provider Name: Annalee Castillo MD    Reason for Appointment Request: Established Patient - Available appointments did not meet patient need  --------------------------------------------------------------------------------------------------------------------------    Relationship to Patient: Self     Call Back Information: OK to leave message on [employer]  Preferred Call Back Number: Phone [phone]

## 2025-01-08 SDOH — ECONOMIC STABILITY: FOOD INSECURITY: WITHIN THE PAST 12 MONTHS, THE FOOD YOU BOUGHT JUST DIDN'T LAST AND YOU DIDN'T HAVE MONEY TO GET MORE.: NEVER TRUE

## 2025-01-08 SDOH — ECONOMIC STABILITY: FOOD INSECURITY: WITHIN THE PAST 12 MONTHS, YOU WORRIED THAT YOUR FOOD WOULD RUN OUT BEFORE YOU GOT MONEY TO BUY MORE.: SOMETIMES TRUE

## 2025-01-08 SDOH — ECONOMIC STABILITY: TRANSPORTATION INSECURITY
IN THE PAST 12 MONTHS, HAS THE LACK OF TRANSPORTATION KEPT YOU FROM MEDICAL APPOINTMENTS OR FROM GETTING MEDICATIONS?: NO

## 2025-01-08 SDOH — ECONOMIC STABILITY: INCOME INSECURITY: IN THE LAST 12 MONTHS, WAS THERE A TIME WHEN YOU WERE NOT ABLE TO PAY THE MORTGAGE OR RENT ON TIME?: PATIENT DECLINED

## 2025-01-09 ASSESSMENT — PATIENT HEALTH QUESTIONNAIRE - PHQ9
2. FEELING DOWN, DEPRESSED OR HOPELESS: NOT AT ALL
SUM OF ALL RESPONSES TO PHQ9 QUESTIONS 1 & 2: 0
1. LITTLE INTEREST OR PLEASURE IN DOING THINGS: NOT AT ALL

## 2025-01-17 DIAGNOSIS — I10 PRIMARY HYPERTENSION: ICD-10-CM

## 2025-01-17 RX ORDER — IRBESARTAN 300 MG/1
300 TABLET ORAL DAILY
Qty: 90 TABLET | Refills: 1 | Status: SHIPPED | OUTPATIENT
Start: 2025-01-17

## 2025-01-17 NOTE — TELEPHONE ENCOUNTER
Medication:   Requested Prescriptions     Pending Prescriptions Disp Refills    irbesartan (AVAPRO) 300 MG tablet 90 tablet 1     Sig: Take 1 tablet by mouth daily        Last Filled:      Patient Phone Number: [phone] (home)     Last appt: 9/3/2024   Next appt: 1/28/2025    Last OARRS:        No data to display

## 2025-01-25 SDOH — ECONOMIC STABILITY: FOOD INSECURITY: WITHIN THE PAST 12 MONTHS, THE FOOD YOU BOUGHT JUST DIDN'T LAST AND YOU DIDN'T HAVE MONEY TO GET MORE.: PATIENT DECLINED

## 2025-01-25 SDOH — ECONOMIC STABILITY: FOOD INSECURITY: WITHIN THE PAST 12 MONTHS, YOU WORRIED THAT YOUR FOOD WOULD RUN OUT BEFORE YOU GOT MONEY TO BUY MORE.: PATIENT DECLINED

## 2025-01-25 SDOH — ECONOMIC STABILITY: INCOME INSECURITY: IN THE LAST 12 MONTHS, WAS THERE A TIME WHEN YOU WERE NOT ABLE TO PAY THE MORTGAGE OR RENT ON TIME?: PATIENT DECLINED

## 2025-01-28 ENCOUNTER — OFFICE VISIT (OUTPATIENT)
Dept: INTERNAL MEDICINE CLINIC | Age: 75
End: 2025-01-28
Payer: COMMERCIAL

## 2025-01-28 VITALS
WEIGHT: 174.8 LBS | BODY MASS INDEX: 25.08 KG/M2 | HEART RATE: 76 BPM | DIASTOLIC BLOOD PRESSURE: 80 MMHG | SYSTOLIC BLOOD PRESSURE: 144 MMHG | TEMPERATURE: 98.1 F

## 2025-01-28 DIAGNOSIS — E78.49 OTHER HYPERLIPIDEMIA: ICD-10-CM

## 2025-01-28 DIAGNOSIS — M25.561 ACUTE PAIN OF BOTH KNEES: ICD-10-CM

## 2025-01-28 DIAGNOSIS — I10 PRIMARY HYPERTENSION: ICD-10-CM

## 2025-01-28 DIAGNOSIS — E55.9 VITAMIN D DEFICIENCY: ICD-10-CM

## 2025-01-28 DIAGNOSIS — N32.81 OAB (OVERACTIVE BLADDER): ICD-10-CM

## 2025-01-28 DIAGNOSIS — R73.03 PREDIABETES: Primary | ICD-10-CM

## 2025-01-28 DIAGNOSIS — M25.562 ACUTE PAIN OF BOTH KNEES: ICD-10-CM

## 2025-01-28 PROCEDURE — 1090F PRES/ABSN URINE INCON ASSESS: CPT | Performed by: INTERNAL MEDICINE

## 2025-01-28 PROCEDURE — 3079F DIAST BP 80-89 MM HG: CPT | Performed by: INTERNAL MEDICINE

## 2025-01-28 PROCEDURE — G8399 PT W/DXA RESULTS DOCUMENT: HCPCS | Performed by: INTERNAL MEDICINE

## 2025-01-28 PROCEDURE — G2211 COMPLEX E/M VISIT ADD ON: HCPCS | Performed by: INTERNAL MEDICINE

## 2025-01-28 PROCEDURE — G8427 DOCREV CUR MEDS BY ELIG CLIN: HCPCS | Performed by: INTERNAL MEDICINE

## 2025-01-28 PROCEDURE — 3017F COLORECTAL CA SCREEN DOC REV: CPT | Performed by: INTERNAL MEDICINE

## 2025-01-28 PROCEDURE — G8419 CALC BMI OUT NRM PARAM NOF/U: HCPCS | Performed by: INTERNAL MEDICINE

## 2025-01-28 PROCEDURE — 1123F ACP DISCUSS/DSCN MKR DOCD: CPT | Performed by: INTERNAL MEDICINE

## 2025-01-28 PROCEDURE — 3077F SYST BP >= 140 MM HG: CPT | Performed by: INTERNAL MEDICINE

## 2025-01-28 PROCEDURE — 1036F TOBACCO NON-USER: CPT | Performed by: INTERNAL MEDICINE

## 2025-01-28 PROCEDURE — 99215 OFFICE O/P EST HI 40 MIN: CPT | Performed by: INTERNAL MEDICINE

## 2025-01-28 RX ORDER — OXYBUTYNIN CHLORIDE 10 MG/1
10 TABLET, EXTENDED RELEASE ORAL DAILY
Qty: 90 TABLET | Refills: 1 | Status: SHIPPED | OUTPATIENT
Start: 2025-01-28

## 2025-01-28 RX ORDER — ROSUVASTATIN CALCIUM 5 MG/1
5 TABLET, COATED ORAL NIGHTLY
Qty: 90 TABLET | Refills: 1 | Status: SHIPPED | OUTPATIENT
Start: 2025-01-28

## 2025-01-28 RX ORDER — NIFEDIPINE 30 MG
30 TABLET, EXTENDED RELEASE ORAL DAILY
Qty: 30 TABLET | Refills: 3 | Status: SHIPPED | OUTPATIENT
Start: 2025-01-28

## 2025-01-28 SDOH — ECONOMIC STABILITY: FOOD INSECURITY: WITHIN THE PAST 12 MONTHS, YOU WORRIED THAT YOUR FOOD WOULD RUN OUT BEFORE YOU GOT MONEY TO BUY MORE.: PATIENT DECLINED

## 2025-01-28 SDOH — ECONOMIC STABILITY: FOOD INSECURITY: WITHIN THE PAST 12 MONTHS, THE FOOD YOU BOUGHT JUST DIDN'T LAST AND YOU DIDN'T HAVE MONEY TO GET MORE.: PATIENT DECLINED

## 2025-01-28 NOTE — PATIENT INSTRUCTIONS
TAKE MED AS ADVISED    DIET/ EXERCISE.    FOLLOW UP WITHIN 4 MONTHS / AS NEEDED    FOLLOW UP FOR FASTING LABS

## 2025-01-28 NOTE — PROGRESS NOTES
Prediabetes  COUNSELLED. CONTROLLED. ADVISED ON DIET / EXERCISE  ADVISED RISK FACTOR MODIFICATION.  MONITOR. MAKE CHANGES AS NEEDED.       2. Primary hypertension  COUNSELLED. UNCONTROLLED  START ON NIFEdipine (ADALAT CC) 30 MG DAILY  CONTINUE OTHER MEDS  ADVISED LOW NA+ / DASH DIET/ EXERCISE. MONITOR. GOAL </= 130/80  F/U LABS  MAKE CHANGES AS NEEDED.       3. Other hyperlipidemia  COUNSELLED. NOT AT GOAL. CONTINUE MED  ADVISED LOW FAT / CHOL DIET/ EXERCISE.  MONITOR.  GOALS D/W PT.  MAKE CHANGES AS NEEDED.       4. Vitamin D deficiency  COUNSELLED. CONTROLLED. MONITOR ON VIT D SUPPLEMENT.  MAKE CHANGES AS NEEDED.       5. Acute on chronic pain of both knees  COUNSELLED. ? OA. EXERCISES. ANALGESICS PRN. MONITOR.  PT DEFERRED X RAY  ADVISED HOME EXERCISES  MONITOR. MAKE CHANGES AS NEEDED.        6. OAB (overactive bladder)  COUNSELLED. UNCONTROLLED  INCREASE oxyBUTYnin (DITROPAN XL)  TO 10 MG DAILY  ADVISED KEGEL'S EXERCISES  BLADDER TRAINING EXERCISES.  MAKE CHANGES AS NEEDED.                 MORE THAN HALF THE TIME SPENT ON HISTORY, PHYSICAL, ASSESSMENT AND PLAN, DISCUSSION AND COUNSELLING - TOTAL TIME > 42 MINS      MEDICATION SIDE EFFECTS D/W PATIENT      RETURN TO CLINIC WITHIN 4 MONTHS / PRN  NEEDS WELLNESS VISIT    FOLLOW UP FOR FASTING LABS

## 2025-03-26 DIAGNOSIS — I10 PRIMARY HYPERTENSION: ICD-10-CM

## 2025-03-26 RX ORDER — CLONIDINE HYDROCHLORIDE 0.2 MG/1
0.2 TABLET ORAL DAILY
Qty: 90 TABLET | Refills: 1 | Status: SHIPPED | OUTPATIENT
Start: 2025-03-26

## 2025-03-26 NOTE — TELEPHONE ENCOUNTER
Medication:   Requested Prescriptions     Pending Prescriptions Disp Refills    cloNIDine (CATAPRES) 0.2 MG tablet [Pharmacy Med Name: CLONIDINE 0.2MG TABLETS] 90 tablet 1     Sig: TAKE 1 TABLET BY MOUTH DAILY        Last Filled:      Patient Phone Number: [phone] (home)     Last appt: 1/28/2025   Next appt: 5/28/2025    Last OARRS:        No data to display

## 2025-04-18 DIAGNOSIS — I10 PRIMARY HYPERTENSION: ICD-10-CM

## 2025-04-21 DIAGNOSIS — R60.0 LOCALIZED EDEMA: ICD-10-CM

## 2025-04-21 RX ORDER — NIFEDIPINE 30 MG
30 TABLET, EXTENDED RELEASE ORAL DAILY
Qty: 90 TABLET | Refills: 1 | Status: SHIPPED | OUTPATIENT
Start: 2025-04-21

## 2025-04-21 RX ORDER — FUROSEMIDE 20 MG/1
TABLET ORAL
Qty: 30 TABLET | Refills: 1 | Status: SHIPPED | OUTPATIENT
Start: 2025-04-21

## 2025-04-21 NOTE — TELEPHONE ENCOUNTER
Medication:   Requested Prescriptions     Pending Prescriptions Disp Refills    NIFEdipine (ADALAT CC) 30 MG extended release tablet [Pharmacy Med Name: NIFEDIPINE 30MG ER (CC) TABLETS] 30 tablet 3     Sig: TAKE 1 TABLET BY MOUTH DAILY        Last Filled:      Patient Phone Number: [phone] (home)     Last appt: 1/28/2025   Next appt: 4/21/2025    Last OARRS:        No data to display

## 2025-04-21 NOTE — TELEPHONE ENCOUNTER
Medication:   Requested Prescriptions     Pending Prescriptions Disp Refills    furosemide (LASIX) 20 MG tablet [Pharmacy Med Name: FUROSEMIDE 20MG TABLETS] 30 tablet 1     Sig: TAKE 1 TABLET BY MOUTH DAILY AS NEEDED FOR SWELLING        Last Filled:      Patient Phone Number: [phone] (home)     Last appt: 1/28/2025   Next appt: 5/28/2025    Last OARRS:        No data to display

## 2025-05-19 ENCOUNTER — OFFICE VISIT (OUTPATIENT)
Dept: INTERNAL MEDICINE CLINIC | Age: 75
End: 2025-05-19
Payer: COMMERCIAL

## 2025-05-19 VITALS
DIASTOLIC BLOOD PRESSURE: 76 MMHG | WEIGHT: 172.2 LBS | HEIGHT: 66 IN | SYSTOLIC BLOOD PRESSURE: 130 MMHG | HEART RATE: 69 BPM | OXYGEN SATURATION: 98 % | BODY MASS INDEX: 27.68 KG/M2 | TEMPERATURE: 98.8 F

## 2025-05-19 DIAGNOSIS — R73.03 PREDIABETES: ICD-10-CM

## 2025-05-19 DIAGNOSIS — N32.81 OAB (OVERACTIVE BLADDER): ICD-10-CM

## 2025-05-19 DIAGNOSIS — E55.9 VITAMIN D DEFICIENCY: ICD-10-CM

## 2025-05-19 DIAGNOSIS — M79.89 LEG SWELLING: Primary | ICD-10-CM

## 2025-05-19 DIAGNOSIS — E78.49 OTHER HYPERLIPIDEMIA: ICD-10-CM

## 2025-05-19 DIAGNOSIS — I10 PRIMARY HYPERTENSION: ICD-10-CM

## 2025-05-19 PROCEDURE — 3074F SYST BP LT 130 MM HG: CPT | Performed by: INTERNAL MEDICINE

## 2025-05-19 PROCEDURE — 99214 OFFICE O/P EST MOD 30 MIN: CPT | Performed by: INTERNAL MEDICINE

## 2025-05-19 PROCEDURE — G8399 PT W/DXA RESULTS DOCUMENT: HCPCS | Performed by: INTERNAL MEDICINE

## 2025-05-19 PROCEDURE — 1090F PRES/ABSN URINE INCON ASSESS: CPT | Performed by: INTERNAL MEDICINE

## 2025-05-19 PROCEDURE — 1036F TOBACCO NON-USER: CPT | Performed by: INTERNAL MEDICINE

## 2025-05-19 PROCEDURE — G8427 DOCREV CUR MEDS BY ELIG CLIN: HCPCS | Performed by: INTERNAL MEDICINE

## 2025-05-19 PROCEDURE — 3078F DIAST BP <80 MM HG: CPT | Performed by: INTERNAL MEDICINE

## 2025-05-19 PROCEDURE — 3017F COLORECTAL CA SCREEN DOC REV: CPT | Performed by: INTERNAL MEDICINE

## 2025-05-19 PROCEDURE — G2211 COMPLEX E/M VISIT ADD ON: HCPCS | Performed by: INTERNAL MEDICINE

## 2025-05-19 PROCEDURE — 1123F ACP DISCUSS/DSCN MKR DOCD: CPT | Performed by: INTERNAL MEDICINE

## 2025-05-19 PROCEDURE — G8419 CALC BMI OUT NRM PARAM NOF/U: HCPCS | Performed by: INTERNAL MEDICINE

## 2025-05-19 RX ORDER — IRBESARTAN 300 MG/1
300 TABLET ORAL DAILY
Qty: 90 TABLET | Refills: 1 | Status: SHIPPED | OUTPATIENT
Start: 2025-05-19

## 2025-05-19 RX ORDER — TORSEMIDE 20 MG/1
20 TABLET ORAL DAILY PRN
Qty: 30 TABLET | Refills: 1 | Status: SHIPPED | OUTPATIENT
Start: 2025-05-19

## 2025-05-19 SDOH — ECONOMIC STABILITY: FOOD INSECURITY: WITHIN THE PAST 12 MONTHS, THE FOOD YOU BOUGHT JUST DIDN'T LAST AND YOU DIDN'T HAVE MONEY TO GET MORE.: PATIENT DECLINED

## 2025-05-19 SDOH — ECONOMIC STABILITY: FOOD INSECURITY: WITHIN THE PAST 12 MONTHS, YOU WORRIED THAT YOUR FOOD WOULD RUN OUT BEFORE YOU GOT MONEY TO BUY MORE.: PATIENT DECLINED

## 2025-05-19 NOTE — PATIENT INSTRUCTIONS
TAKE MED AS ADVISED    DIET/ EXERCISE.    FOLLOW UP WITHIN  5 WEEKS / AS NEEDED    FOLLOW UP FOR FASTING LABS    [facility] Locations - No appointment necessary.  ? indicates the location is open Saturdays in addition to Monday through Friday.   Call your preferred location for test preparation, business hours and other information you need.   [facility] accepts all insurances.  [facility]  [facility]    ? [place]   [address]   [address]   [place] [address]    Ph: [phone]  [facility]   [address] [place] [address]    Ph: [phone]   ? [place]   [address]    [place] [address]    Ph: [phone]     [facility]   [address] [place] [address]    Ph: [phone] ? [place]   [address] [place] [address]   Ph: [phone]  ? [facility]   [address] [place] [address]    Ph: [phone]      [place]   [address] [place] [address]   Ph: [phone]     [facility]  [address] [place] [address]   Ph: [phone]    [place]    ? [facility]   [address] [place] [address]    Ph: [phone]  [facility]   [address] [place] [address]   Ph: [phone]  [place]   [address]   [facility], [address]    PH: [phone]    [facility]   [address] [place] [address]    Ph: [phone]  [place]  [address] [place] [address]  Ph: [phone]  [facility]   [address] [place] [address]    Ph: [phone]

## 2025-05-19 NOTE — PROGRESS NOTES
SUBJECTIVE:  Dorcas Alfredo is a 74 y.o. female HERE FOR   Chief Complaint   Patient presents with    Other     Er visit      PT HERE FOR EVAL / S/P ER VISIT     DOMINICK LEG SWELLING - STATES INCREASED RECENTLY. OCC LEG PAIN - ANT - LT. ? XTER. ? PAIN SCALE. TAKING MED. ADVISED TO TAKE 2 TABS PAST FEW DAYS FOLLOWING ER VISIT  HTN - TAKING MEDS.   BP ELEVATED RECENTLY=  SEEN IN ER. BP NOTED TODAY. + DIET / EXERCISE COMPLIANCE, NO HEADACHE, NO DIZZINESS.   HLP - STATES NOT TAKING MED - ? S/E. + DIET / EXERCISE COMPLIANCE. LDL NOT GOAL. LABS D/W PT   PREDIABETES - DIET / EXERCISE REVIEWED.  IMPROVED - LAB D/W PT  VIT D DEF - TAKING MED. LABS D/W PT   OAB -  URI URGENCY - PERSISTENT. No DYSURIA, ? FREQ, No HEMATURIA. TAKING NOT MED - DID NOT TOLERATE       DENIES CP, No SOB, No PALPITATIONS, NO COUGH, NO F/C  No ABD PAIN, No N/V, No DIARRHEA, NO CONSTIPATION, No MELENA, NO HEMATOCHEZIA       PMH: REVIEWED AND UPDATED TODAY    PSH: REVIEWED AND UPDATED TODAY    SOCIAL HX: REVIEWED AND UPDATED TODAY    FAMILY HX: REVIEWED AND UPDATED TODAY    ALLERGY:  Celebrex [celecoxib], Sulfa antibiotics, and Codeine    MEDS: REVIEWED  Prior to Visit Medications    Medication Sig Taking? Authorizing Provider   NIFEdipine (ADALAT CC) 30 MG extended release tablet TAKE 1 TABLET BY MOUTH DAILY Yes Annalee Castillo MD   furosemide (LASIX) 20 MG tablet TAKE 1 TABLET BY MOUTH DAILY AS NEEDED FOR SWELLING Yes Annalee Castillo MD   cloNIDine (CATAPRES) 0.2 MG tablet TAKE 1 TABLET BY MOUTH DAILY Yes Annalee Castillo MD   carboxymethylcellulose 1 % ophthalmic solution Place 1 drop into both eyes in the morning and at bedtime Yes Provider, MD Nan   oxyBUTYnin (DITROPAN XL) 10 MG extended release tablet Take 1 tablet by mouth daily  Annalee Castillo MD   rosuvastatin (CRESTOR) 5 MG tablet Take 1 tablet by mouth nightly  Annalee Castillo MD   irbesartan (AVAPRO) 300 MG tablet Take 1 tablet by mouth daily Yes Annalee Castillo MD   diclofenac

## 2025-05-20 ENCOUNTER — HOSPITAL ENCOUNTER (OUTPATIENT)
Dept: VASCULAR LAB | Age: 75
Discharge: HOME | End: 2025-05-22
Payer: COMMERCIAL

## 2025-05-20 DIAGNOSIS — M79.89 LEG SWELLING: ICD-10-CM

## 2025-05-20 LAB — VAS LEFT CFV RFX: 1.4 S

## 2025-05-20 PROCEDURE — 93971 EXTREMITY STUDY: CPT

## 2025-05-20 PROCEDURE — 93971 EXTREMITY STUDY: CPT | Performed by: SURGERY

## 2025-05-20 RX ORDER — TORSEMIDE 20 MG/1
20 TABLET ORAL DAILY PRN
Qty: 90 TABLET | OUTPATIENT
Start: 2025-05-20

## 2025-05-20 NOTE — TELEPHONE ENCOUNTER
Medication:   Requested Prescriptions     Pending Prescriptions Disp Refills    torsemide (DEMADEX) 20 MG tablet [Pharmacy Med Name: TORSEMIDE 20MG TABLETS] 90 tablet      Sig: TAKE 1 TABLET BY MOUTH DAILY AS NEEDED        Last Filled:      Patient Phone Number: [phone] (home)     Last appt: 5/19/2025   Next appt: 6/23/2025    Last OARRS:        No data to display

## 2025-05-21 ENCOUNTER — RESULTS FOLLOW-UP (OUTPATIENT)
Dept: INTERNAL MEDICINE CLINIC | Age: 75
End: 2025-05-21

## 2025-06-18 DIAGNOSIS — E78.49 OTHER HYPERLIPIDEMIA: ICD-10-CM

## 2025-06-18 DIAGNOSIS — R73.03 PREDIABETES: ICD-10-CM

## 2025-06-18 DIAGNOSIS — I10 PRIMARY HYPERTENSION: ICD-10-CM

## 2025-06-18 DIAGNOSIS — E55.9 VITAMIN D DEFICIENCY: ICD-10-CM

## 2025-06-18 LAB
25(OH)D3 SERPL-MCNC: 51.8 NG/ML
ALBUMIN SERPL-MCNC: 4.4 G/DL (ref 3.4–5)
ALBUMIN/GLOB SERPL: 2.2 {RATIO} (ref 1.1–2.2)
ALP SERPL-CCNC: 108 U/L (ref 40–129)
ALT SERPL-CCNC: 16 U/L (ref 10–40)
ANION GAP SERPL CALCULATED.3IONS-SCNC: 8 MMOL/L (ref 3–16)
AST SERPL-CCNC: 18 U/L (ref 15–37)
BILIRUB SERPL-MCNC: 0.8 MG/DL (ref 0–1)
BUN SERPL-MCNC: 13 MG/DL (ref 7–20)
CALCIUM SERPL-MCNC: 9.7 MG/DL (ref 8.3–10.6)
CHLORIDE SERPL-SCNC: 105 MMOL/L (ref 99–110)
CHOLEST SERPL-MCNC: 127 MG/DL (ref 0–199)
CO2 SERPL-SCNC: 29 MMOL/L (ref 21–32)
CREAT SERPL-MCNC: 0.8 MG/DL (ref 0.6–1.2)
GFR SERPLBLD CREATININE-BSD FMLA CKD-EPI: 78 ML/MIN/{1.73_M2}
GLUCOSE SERPL-MCNC: 88 MG/DL (ref 70–99)
HDLC SERPL-MCNC: 51 MG/DL (ref 40–60)
LDLC SERPL CALC-MCNC: 62 MG/DL
POTASSIUM SERPL-SCNC: 4.7 MMOL/L (ref 3.5–5.1)
PROT SERPL-MCNC: 6.4 G/DL (ref 6.4–8.2)
SODIUM SERPL-SCNC: 142 MMOL/L (ref 136–145)
TRIGL SERPL-MCNC: 72 MG/DL (ref 0–150)
VLDLC SERPL CALC-MCNC: 14 MG/DL

## 2025-06-20 SDOH — HEALTH STABILITY: PHYSICAL HEALTH: ON AVERAGE, HOW MANY MINUTES DO YOU ENGAGE IN EXERCISE AT THIS LEVEL?: 60 MIN

## 2025-06-20 SDOH — HEALTH STABILITY: PHYSICAL HEALTH: ON AVERAGE, HOW MANY DAYS PER WEEK DO YOU ENGAGE IN MODERATE TO STRENUOUS EXERCISE (LIKE A BRISK WALK)?: 6 DAYS

## 2025-06-20 ASSESSMENT — PATIENT HEALTH QUESTIONNAIRE - PHQ9
2. FEELING DOWN, DEPRESSED OR HOPELESS: NOT AT ALL
SUM OF ALL RESPONSES TO PHQ QUESTIONS 1-9: 0
SUM OF ALL RESPONSES TO PHQ QUESTIONS 1-9: 0
1. LITTLE INTEREST OR PLEASURE IN DOING THINGS: NOT AT ALL
SUM OF ALL RESPONSES TO PHQ QUESTIONS 1-9: 0
SUM OF ALL RESPONSES TO PHQ QUESTIONS 1-9: 0

## 2025-06-20 ASSESSMENT — LIFESTYLE VARIABLES
HOW OFTEN DO YOU HAVE A DRINK CONTAINING ALCOHOL: NEVER
HOW MANY STANDARD DRINKS CONTAINING ALCOHOL DO YOU HAVE ON A TYPICAL DAY: PATIENT DOES NOT DRINK
HOW MANY STANDARD DRINKS CONTAINING ALCOHOL DO YOU HAVE ON A TYPICAL DAY: 0
HOW OFTEN DO YOU HAVE SIX OR MORE DRINKS ON ONE OCCASION: 1
HOW OFTEN DO YOU HAVE A DRINK CONTAINING ALCOHOL: 1

## 2025-06-21 DIAGNOSIS — M79.89 LEG SWELLING: ICD-10-CM

## 2025-06-23 ENCOUNTER — OFFICE VISIT (OUTPATIENT)
Dept: INTERNAL MEDICINE CLINIC | Age: 75
End: 2025-06-23
Payer: COMMERCIAL

## 2025-06-23 VITALS
HEART RATE: 72 BPM | OXYGEN SATURATION: 99 % | HEIGHT: 66 IN | BODY MASS INDEX: 27.32 KG/M2 | DIASTOLIC BLOOD PRESSURE: 80 MMHG | SYSTOLIC BLOOD PRESSURE: 130 MMHG | WEIGHT: 170 LBS

## 2025-06-23 DIAGNOSIS — M79.89 LEG SWELLING: ICD-10-CM

## 2025-06-23 DIAGNOSIS — N32.81 OAB (OVERACTIVE BLADDER): ICD-10-CM

## 2025-06-23 DIAGNOSIS — R73.03 PREDIABETES: ICD-10-CM

## 2025-06-23 DIAGNOSIS — I10 PRIMARY HYPERTENSION: ICD-10-CM

## 2025-06-23 DIAGNOSIS — Z00.00 MEDICARE ANNUAL WELLNESS VISIT, SUBSEQUENT: Primary | ICD-10-CM

## 2025-06-23 DIAGNOSIS — E78.49 OTHER HYPERLIPIDEMIA: ICD-10-CM

## 2025-06-23 DIAGNOSIS — E55.9 VITAMIN D DEFICIENCY: ICD-10-CM

## 2025-06-23 PROCEDURE — 3075F SYST BP GE 130 - 139MM HG: CPT | Performed by: INTERNAL MEDICINE

## 2025-06-23 PROCEDURE — 3017F COLORECTAL CA SCREEN DOC REV: CPT | Performed by: INTERNAL MEDICINE

## 2025-06-23 PROCEDURE — G0439 PPPS, SUBSEQ VISIT: HCPCS | Performed by: INTERNAL MEDICINE

## 2025-06-23 PROCEDURE — 1123F ACP DISCUSS/DSCN MKR DOCD: CPT | Performed by: INTERNAL MEDICINE

## 2025-06-23 PROCEDURE — 3079F DIAST BP 80-89 MM HG: CPT | Performed by: INTERNAL MEDICINE

## 2025-06-23 RX ORDER — TORSEMIDE 20 MG/1
20 TABLET ORAL DAILY PRN
Qty: 30 TABLET | Refills: 1 | Status: SHIPPED | OUTPATIENT
Start: 2025-06-23

## 2025-06-23 RX ORDER — CLONIDINE HYDROCHLORIDE 0.2 MG/1
0.2 TABLET ORAL DAILY
Qty: 90 TABLET | Refills: 1 | Status: SHIPPED | OUTPATIENT
Start: 2025-06-23

## 2025-06-23 RX ORDER — NIFEDIPINE 30 MG
30 TABLET, EXTENDED RELEASE ORAL DAILY
Qty: 90 TABLET | Refills: 1 | Status: SHIPPED | OUTPATIENT
Start: 2025-06-23

## 2025-06-23 RX ORDER — TORSEMIDE 20 MG/1
20 TABLET ORAL DAILY PRN
Qty: 30 TABLET | Refills: 1 | OUTPATIENT
Start: 2025-06-23

## 2025-06-23 NOTE — PATIENT INSTRUCTIONS

## 2025-06-23 NOTE — PROGRESS NOTES
AS NEEDED.                         MEDICATION SIDE EFFECTS D/W PATIENT      RETURN TO CLINIC WITHIN 4 MONTHS / PRN

## 2025-07-16 DIAGNOSIS — I10 PRIMARY HYPERTENSION: ICD-10-CM

## 2025-07-17 RX ORDER — IRBESARTAN 300 MG/1
300 TABLET ORAL DAILY
Qty: 90 TABLET | Refills: 1 | Status: SHIPPED | OUTPATIENT
Start: 2025-07-17

## 2025-07-17 NOTE — TELEPHONE ENCOUNTER
Medication:   Requested Prescriptions     Pending Prescriptions Disp Refills    irbesartan (AVAPRO) 300 MG tablet 90 tablet 1     Sig: Take 1 tablet by mouth daily        Last Filled:      Patient Phone Number: [phone] (home)     Last appt: 6/23/2025   Next appt: 10/23/2025    Last OARRS:        No data to display

## 2025-08-02 DIAGNOSIS — M79.89 LEG SWELLING: ICD-10-CM

## 2025-08-04 RX ORDER — TORSEMIDE 20 MG/1
20 TABLET ORAL DAILY PRN
Qty: 30 TABLET | Refills: 1 | Status: SHIPPED | OUTPATIENT
Start: 2025-08-04